# Patient Record
Sex: FEMALE | Race: WHITE | Employment: PART TIME | URBAN - METROPOLITAN AREA
[De-identification: names, ages, dates, MRNs, and addresses within clinical notes are randomized per-mention and may not be internally consistent; named-entity substitution may affect disease eponyms.]

---

## 2014-08-22 LAB — HEP C HIM: NORMAL

## 2017-03-03 ENCOUNTER — TRANSFERRED RECORDS (OUTPATIENT)
Dept: HEALTH INFORMATION MANAGEMENT | Facility: CLINIC | Age: 32
End: 2017-03-03

## 2017-03-10 ENCOUNTER — TRANSFERRED RECORDS (OUTPATIENT)
Dept: HEALTH INFORMATION MANAGEMENT | Facility: CLINIC | Age: 32
End: 2017-03-10

## 2017-03-10 LAB — TSH SERPL-ACNC: 2.12 UIU/ML (ref 0.45–4.5)

## 2017-03-13 ENCOUNTER — MEDICAL CORRESPONDENCE (OUTPATIENT)
Dept: HEALTH INFORMATION MANAGEMENT | Facility: CLINIC | Age: 32
End: 2017-03-13

## 2017-03-14 ENCOUNTER — OFFICE VISIT (OUTPATIENT)
Dept: SURGERY | Facility: CLINIC | Age: 32
End: 2017-03-14
Payer: COMMERCIAL

## 2017-03-14 VITALS
HEIGHT: 67 IN | SYSTOLIC BLOOD PRESSURE: 138 MMHG | BODY MASS INDEX: 33.74 KG/M2 | HEART RATE: 77 BPM | WEIGHT: 215 LBS | DIASTOLIC BLOOD PRESSURE: 88 MMHG

## 2017-03-14 DIAGNOSIS — N83.201 RIGHT OVARIAN CYST: Primary | ICD-10-CM

## 2017-03-14 PROCEDURE — 99204 OFFICE O/P NEW MOD 45 MIN: CPT | Performed by: SURGERY

## 2017-03-14 NOTE — MR AVS SNAPSHOT
"              After Visit Summary   3/14/2017    Leisa Leong    MRN: [id number]           Patient Information     Date Of Birth          1985        Visit Information        Provider Department      3/14/2017 1:45 PM Ricardo Acosta MD [facility]       Follow-ups after your visit        Who to contact     If you have questions or need follow up information about today's clinic visit or your schedule please contact [facility] directly at [phone].  Normal or non-critical lab and imaging results will be communicated to you by [employer], letter or phone within 4 business days after the clinic has received the results. If you do not hear from us within 7 days, please contact the clinic through [employer] or phone. If you have a critical or abnormal lab result, we will notify you by phone as soon as possible.  Submit refill requests through [employer] or call your pharmacy and they will forward the refill request to us. Please allow 3 business days for your refill to be completed.          Additional Information About Your Visit        MyChart Information     [employer] gives you secure access to your electronic health record. If you see a primary care provider, you can also send messages to your care team and make appointments. If you have questions, please call your primary care clinic.  If you do not have a primary care provider, please call [phone] and they will assist you.        Care EveryWhere ID     This is your Care EveryWhere ID. This could be used by other organizations to access your [place] medical records  [id number]        Your Vitals Were     Pulse Height BMI (Body Mass Index)             77 5' 7\" (1.702 m) 33.67 kg/m2          Blood Pressure from Last 3 Encounters:   03/14/17 138/88   09/30/16 120/72   09/23/16 119/67    Weight from Last 3 Encounters:   03/14/17 215 lb (97.5 kg)   09/30/16 220 lb (99.8 kg) "   09/23/16 219 lb 12.8 oz (99.7 kg)              Today, you had the following     No orders found for display       Primary Care Provider Office Phone # Fax #    Nubia Felicia Gregorio [phone]       [facility] [address]  [place] [address]        Thank you!     Thank you for choosing [facility]  for your care. Our goal is always to provide you with excellent care. Hearing back from our patients is one way we can continue to improve our services. Please take a few minutes to complete the written survey that you may receive in the mail after your visit with us. Thank you!             Your Updated Medication List - Protect others around you: Learn how to safely use, store and throw away your medicines at www.disposemymeds.org.          This list is accurate as of: 3/14/17  1:55 PM.  Always use your most recent med list.                   Brand Name Dispense Instructions for use    butalbital-acetaminophen-caffeine -40 MG per tablet    FIORICET/ESGIC     Take 1 tablet by mouth every 4 hours as needed for headaches or migraine Reported on 3/14/2017       fluticasone-salmeterol 115-21 MCG/ACT Inhaler    ADVAIR-HFA     Inhale 2 puffs into the lungs 2 times daily       ibuprofen 800 MG tablet    ADVIL/MOTRIN    40 tablet    Take 1 tablet (800 mg) by mouth every 6 hours as needed for other (cramping)       loratadine 10 MG tablet    CLARITIN     Take 10 mg by mouth daily Reported on 3/14/2017       oxyCODONE-acetaminophen 5-325 MG per tablet    PERCOCET    30 tablet    Take 1-2 tablets by mouth every 4 hours as needed for pain (moderate to severe)       PRENATAL 1 PO      Take 1 tablet by mouth daily Reported on 3/14/2017       PROAIR HFA IN      Inhale 2 puffs into the lungs daily.       TYLENOL PO      Take 1,000 mg by mouth every 6 hours as needed Reported on 3/14/2017

## 2017-03-14 NOTE — LETTER
Surgery Consultation, [facility] WILLIAM Acosta MD    2017     Leisa Leong MRN# [id number]   YOB: 1985 Age: 31 year old      PCP: Nubia Gregorio [phone]     Chief Complaint: Ovarian cyst, intra-abdominal adhesions     Pt was seen in consultation from Nubia Gregorio.     History of Present Illness: Leisa Leong is a 31 year old female who presented with Several months of abdominal pain. She is been noted to have an ovarian cyst and there is some concern about her ability to become pregnant. Patient has one daughter and is interested in additional pregnancy. She has had multiple abdominal surgeries, both open and laparoscopic. She has a history of multiple fibroids and has had surgery for endometriosis and intra-abdominal adhesions. We are asked to help manage intra-abdominal adhesions at the time of gynecologic exploration.     PMH: Leisa Leong  has a past medical history of Anxiety and depression; Asthma; Family history of diabetes mellitus; Family history of other cardiovascular diseases; Fibroids; Headaches; HPV in female; previous reproductive problem; Migraine; Motion sickness; Other acne; PONV (postoperative nausea and vomiting); and Seasonal allergies.  PSH: Leisa Leong  has a past surgical history that includes hysteroscopy,remove myoma (2008); LASIK (EMP) W OPT MYOPIA -3.12 (2009); pending (2011); CREATE EARDRUM OPENING,GEN ANESTH (infant); REMOVAL OF TONSILS,12+ Y/O (3/2008); Excise lesion perineal (2014); TOOTH EXTRACTION W/FORCEP (3/2007); Lasik bilateral (Bilateral, );  section (N/A, 3/4/2015); Remove cerclage (N/A, 3/4/2015); Laparoscopic lysis adhesions (N/A, 2016); HYSTEROSCOPY, SURGICAL; W/ REMOVAL LEIOMYOMATA (2007); Dilation and curettage suction (1/10/2013); Myomectomy uterus (2013); Cystectomy ovarian benign (2013); Dilation and curettage; Operative hysteroscopy with  "morcellator (6/12/2014); Laparoscopic tubal dye study (6/12/2014); Cerclage cervical (N/A, 12/10/2014); Cerclage cervical (N/A, 1/30/2015); Dilation and curettage, operative hysteroscopy with morcellator, combined (N/A, 8/9/2016); Laparoscopic tubal dye study (8/9/2016); GYN surgery; wisdom teeth extraction; and Operative hysteroscopy with morcellator (N/A, 9/23/2016).     Home medications and allergies reviewed.     Social History: Leisa Leong  reports that she quit smoking about 4 years ago. She has a 3.25 pack-year smoking history. She has never used smokeless tobacco. She reports that she drinks alcohol. She reports that she does not use illicit drugs.  Family History: Leisa Leong family history includes CANCER in her father; DIABETES in her maternal grandmother; Depression in her father, maternal grandfather, mother, and paternal grandmother; Hypertension in her father. There is no history of C.A.D., Breast Cancer, or Cancer - colorectal.     ROS: The 10 point Review of Systems is negative other than noted in the HPI.      Physical Exam:  Blood pressure 138/88, pulse 77, height 5' 7\" (1.702 m), weight 215 lb (97.5 kg), not currently breastfeeding.  215 lbs 0 oz  Overweight healthy appearing female in no distress.  Patient has a pleasant affect and communicates well.   Pupils equal round and reactive to light.   No cervical lymphadenopathy or thyromegaly.   Lung fields clear, breathing comfortably.   Heart normal sinus rhythm. No murmurs rubs or gallops.  Abdomen soft, nontender, nondistended. Low transverse incision, well-healed. Laparoscopy scars.  Skin warm, dry. No obvious rashes or lesions.     All new lab and imaging data was reviewed.       Assessment/plan: Pleasant 31-year-old female interested in getting pregnant. Has had a very complicated surgical history with multiple intra-abdominal adhesions noted. I think attempts could be made to perform a laparoscopic lysis of adhesions to identify and " address any adnexal issues in the pelvis. Patient understands that, if laparoscopic mobilization of the structures was not possible, open procedure may need to be considered. Patient also understands there is a risk of injury to the bowel or bladder. I will discuss the matter with her gynecologic surgeon and we will try to schedule a time.  Surgical co-morbities include Multiple previous surgeries, obesity.     Tomy Acosta M.D.  [facility]  [phone]

## 2017-03-17 NOTE — PROGRESS NOTES
Surgery Consultation, [facility], WILLIAM Acosta MD    Leisa Leong MRN# [id number]   YOB: 1985 Age: 31 year old     PCP:  Nubia Gregorio [phone]    Chief Complaint:  Ovarian cyst, intra-abdominal adhesions    Pt was seen in consultation from Nubia Gregorio.    History of Present Illness:  Leisa Leong is a 31 year old female who presented with Several months of abdominal pain. She is been noted to have an ovarian cyst and there is some concern about her ability to become pregnant. Patient has one daughter and is interested in additional pregnancy. She has had multiple abdominal surgeries, both open and laparoscopic. She has a history of multiple fibroids and has had surgery for endometriosis and intra-abdominal adhesions. We are asked to help manage intra-abdominal adhesions at the time of gynecologic exploration.    PMH:  Leisa Leong  has a past medical history of Anxiety and depression; Asthma; Family history of diabetes mellitus; Family history of other cardiovascular diseases; Fibroids; Headaches; HPV in female; previous reproductive problem; Migraine; Motion sickness; Other acne; PONV (postoperative nausea and vomiting); and Seasonal allergies.  PSH:  Leisa Leong  has a past surgical history that includes hysteroscopy,remove myoma (2008); LASIK (EMP) W OPT MYOPIA -3.12 (2009); pending (2011); CREATE EARDRUM OPENING,GEN ANESTH (infant); REMOVAL OF TONSILS,12+ Y/O (3/2008); Excise lesion perineal (2014); TOOTH EXTRACTION W/FORCEP (3/2007); Lasik bilateral (Bilateral, );  section (N/A, 3/4/2015); Remove cerclage (N/A, 3/4/2015); Laparoscopic lysis adhesions (N/A, 2016); HYSTEROSCOPY, SURGICAL; W/ REMOVAL LEIOMYOMATA (2007); Dilation and curettage suction (1/10/2013); Myomectomy uterus (2013); Cystectomy ovarian benign (2013); Dilation and curettage; Operative hysteroscopy with morcellator  "(6/12/2014); Laparoscopic tubal dye study (6/12/2014); Cerclage cervical (N/A, 12/10/2014); Cerclage cervical (N/A, 1/30/2015); Dilation and curettage, operative hysteroscopy with morcellator, combined (N/A, 8/9/2016); Laparoscopic tubal dye study (8/9/2016); GYN surgery; wisdom teeth extraction; and Operative hysteroscopy with morcellator (N/A, 9/23/2016).    Home medications and allergies reviewed.    Social History:  Leisa Leong  reports that she quit smoking about 4 years ago. She has a 3.25 pack-year smoking history. She has never used smokeless tobacco. She reports that she drinks alcohol. She reports that she does not use illicit drugs.  Family History:  Leisa Leong family history includes CANCER in her father; DIABETES in her maternal grandmother; Depression in her father, maternal grandfather, mother, and paternal grandmother; Hypertension in her father. There is no history of C.A.D., Breast Cancer, or Cancer - colorectal.    ROS:  The 10 point Review of Systems is negative other than noted in the HPI.      Physical Exam:  Blood pressure 138/88, pulse 77, height 5' 7\" (1.702 m), weight 215 lb (97.5 kg), not currently breastfeeding.  215 lbs 0 oz  Overweight healthy appearing female in no distress.  Patient has a pleasant affect and communicates well.   Pupils equal round and reactive to light.   No cervical lymphadenopathy or thyromegaly.   Lung fields clear, breathing comfortably.   Heart normal sinus rhythm.  No murmurs rubs or gallops.  Abdomen soft, nontender, nondistended.  Low transverse incision, well-healed. Laparoscopy scars.  Skin warm, dry.  No obvious rashes or lesions.    All new lab and imaging data was reviewed.      Assessment/plan:  Pleasant 31-year-old female interested in getting pregnant. Has had a very complicated surgical history with multiple intra-abdominal adhesions noted. I think attempts could be made to perform a laparoscopic lysis of adhesions to identify and address " any adnexal issues in the pelvis. Patient understands that, if laparoscopic mobilization of the structures was not possible, open procedure may need to be considered. Patient also understands there is a risk of injury to the bowel or bladder. I will discuss the matter with her gynecologic surgeon and we will try to schedule a time.  Surgical co-morbities include Multiple previous surgeries, obesity.    Tomy Acosta M.D.  [facility]  [phone]    Please route or send letter to:  Primary Care Provider (PCP) and Referring Provider

## 2017-04-25 ENCOUNTER — HOSPITAL ENCOUNTER (OUTPATIENT)
Facility: CLINIC | Age: 32
Discharge: HOME | End: 2017-04-26
Attending: OBSTETRICS & GYNECOLOGY | Admitting: OBSTETRICS & GYNECOLOGY
Payer: COMMERCIAL

## 2017-04-25 ENCOUNTER — ANESTHESIA EVENT (OUTPATIENT)
Dept: SURGERY | Facility: CLINIC | Age: 32
End: 2017-04-25
Payer: COMMERCIAL

## 2017-04-25 ENCOUNTER — ANESTHESIA (OUTPATIENT)
Dept: SURGERY | Facility: CLINIC | Age: 32
End: 2017-04-25
Payer: COMMERCIAL

## 2017-04-25 ENCOUNTER — HOSPITAL ENCOUNTER (OUTPATIENT)
Dept: ULTRASOUND IMAGING | Facility: CLINIC | Age: 32
End: 2017-04-25
Attending: OBSTETRICS & GYNECOLOGY | Admitting: OBSTETRICS & GYNECOLOGY
Payer: COMMERCIAL

## 2017-04-25 ENCOUNTER — APPOINTMENT (OUTPATIENT)
Dept: SURGERY | Facility: PHYSICIAN GROUP | Age: 32
End: 2017-04-25
Payer: COMMERCIAL

## 2017-04-25 DIAGNOSIS — G89.18 POST-OP PAIN: ICD-10-CM

## 2017-04-25 DIAGNOSIS — N83.201 RIGHT OVARIAN CYST: Primary | ICD-10-CM

## 2017-04-25 DIAGNOSIS — N83.209 CYST OF OVARY, UNSPECIFIED LATERALITY: ICD-10-CM

## 2017-04-25 DIAGNOSIS — K59.03 DRUG-INDUCED CONSTIPATION: ICD-10-CM

## 2017-04-25 PROBLEM — Z98.890 S/P LAPAROSCOPY: Status: ACTIVE | Noted: 2017-04-25

## 2017-04-25 LAB — HCG SERPL QL: NEGATIVE

## 2017-04-25 PROCEDURE — 25000125 ZZHC RX 250

## 2017-04-25 PROCEDURE — 71000013 ZZH RECOVERY PHASE 1 LEVEL 1 EA ADDTL HR: Performed by: OBSTETRICS & GYNECOLOGY

## 2017-04-25 PROCEDURE — 25800025 ZZH RX 258

## 2017-04-25 PROCEDURE — 84703 CHORIONIC GONADOTROPIN ASSAY: CPT | Performed by: OBSTETRICS & GYNECOLOGY

## 2017-04-25 PROCEDURE — 25800025 ZZH RX 258: Performed by: ANESTHESIOLOGY

## 2017-04-25 PROCEDURE — 88305 TISSUE EXAM BY PATHOLOGIST: CPT | Performed by: OBSTETRICS & GYNECOLOGY

## 2017-04-25 PROCEDURE — 25000125 ZZHC RX 250: Performed by: OBSTETRICS & GYNECOLOGY

## 2017-04-25 PROCEDURE — 25000128 H RX IP 250 OP 636: Performed by: OBSTETRICS & GYNECOLOGY

## 2017-04-25 PROCEDURE — 71000012 ZZH RECOVERY PHASE 1 LEVEL 1 FIRST HR: Performed by: OBSTETRICS & GYNECOLOGY

## 2017-04-25 PROCEDURE — 40000170 ZZH STATISTIC PRE-PROCEDURE ASSESSMENT II: Performed by: OBSTETRICS & GYNECOLOGY

## 2017-04-25 PROCEDURE — 36415 COLL VENOUS BLD VENIPUNCTURE: CPT | Performed by: OBSTETRICS & GYNECOLOGY

## 2017-04-25 PROCEDURE — 25000128 H RX IP 250 OP 636: Performed by: ANESTHESIOLOGY

## 2017-04-25 PROCEDURE — 40000936 ZZH STATISTIC OUTPATIENT (NON-OBS) NIGHT

## 2017-04-25 PROCEDURE — 40000864 US INTRAOPERATIVE

## 2017-04-25 PROCEDURE — S0020 INJECTION, BUPIVICAINE HYDRO: HCPCS | Performed by: OBSTETRICS & GYNECOLOGY

## 2017-04-25 PROCEDURE — 88305 TISSUE EXAM BY PATHOLOGIST: CPT | Mod: 26 | Performed by: OBSTETRICS & GYNECOLOGY

## 2017-04-25 PROCEDURE — 40000935 ZZH STATISTIC OUTPATIENT (NON-OBS) EVE

## 2017-04-25 PROCEDURE — 36000056 ZZH SURGERY LEVEL 3 1ST 30 MIN: Performed by: OBSTETRICS & GYNECOLOGY

## 2017-04-25 PROCEDURE — 25800025 ZZH RX 258: Performed by: OBSTETRICS & GYNECOLOGY

## 2017-04-25 PROCEDURE — 37000008 ZZH ANESTHESIA TECHNICAL FEE, 1ST 30 MIN: Performed by: OBSTETRICS & GYNECOLOGY

## 2017-04-25 PROCEDURE — 27210995 ZZH RX 272: Performed by: OBSTETRICS & GYNECOLOGY

## 2017-04-25 PROCEDURE — 40000934 ZZH STATISTIC OUTPATIENT (NON-OBS) DAY

## 2017-04-25 PROCEDURE — 44180 LAP ENTEROLYSIS: CPT | Performed by: SURGERY

## 2017-04-25 PROCEDURE — 25000128 H RX IP 250 OP 636: Performed by: SURGERY

## 2017-04-25 PROCEDURE — 36000058 ZZH SURGERY LEVEL 3 EA 15 ADDTL MIN: Performed by: OBSTETRICS & GYNECOLOGY

## 2017-04-25 PROCEDURE — 37000009 ZZH ANESTHESIA TECHNICAL FEE, EACH ADDTL 15 MIN: Performed by: OBSTETRICS & GYNECOLOGY

## 2017-04-25 PROCEDURE — 25000128 H RX IP 250 OP 636

## 2017-04-25 PROCEDURE — 25000566 ZZH SEVOFLURANE, EA 15 MIN: Performed by: OBSTETRICS & GYNECOLOGY

## 2017-04-25 PROCEDURE — 25000132 ZZH RX MED GY IP 250 OP 250 PS 637: Performed by: OBSTETRICS & GYNECOLOGY

## 2017-04-25 PROCEDURE — 27210794 ZZH OR GENERAL SUPPLY STERILE: Performed by: OBSTETRICS & GYNECOLOGY

## 2017-04-25 RX ORDER — ONDANSETRON 2 MG/ML
4 INJECTION INTRAMUSCULAR; INTRAVENOUS EVERY 6 HOURS PRN
Status: DISCONTINUED | OUTPATIENT
Start: 2017-04-25 | End: 2017-04-26 | Stop reason: HOSPADM

## 2017-04-25 RX ORDER — CEFAZOLIN SODIUM 2 G/100ML
2 INJECTION, SOLUTION INTRAVENOUS
Status: DISCONTINUED | OUTPATIENT
Start: 2017-04-25 | End: 2017-04-25 | Stop reason: HOSPADM

## 2017-04-25 RX ORDER — LIDOCAINE HYDROCHLORIDE 20 MG/ML
INJECTION, SOLUTION INFILTRATION; PERINEURAL PRN
Status: DISCONTINUED | OUTPATIENT
Start: 2017-04-25 | End: 2017-04-25

## 2017-04-25 RX ORDER — ALBUTEROL SULFATE 90 UG/1
2 AEROSOL, METERED RESPIRATORY (INHALATION) DAILY PRN
Status: DISCONTINUED | OUTPATIENT
Start: 2017-04-25 | End: 2017-04-26 | Stop reason: HOSPADM

## 2017-04-25 RX ORDER — ONDANSETRON 4 MG/1
4 TABLET, ORALLY DISINTEGRATING ORAL EVERY 6 HOURS PRN
Status: DISCONTINUED | OUTPATIENT
Start: 2017-04-25 | End: 2017-04-26 | Stop reason: HOSPADM

## 2017-04-25 RX ORDER — GLYCOPYRROLATE 0.2 MG/ML
INJECTION, SOLUTION INTRAMUSCULAR; INTRAVENOUS PRN
Status: DISCONTINUED | OUTPATIENT
Start: 2017-04-25 | End: 2017-04-25

## 2017-04-25 RX ORDER — OXYCODONE AND ACETAMINOPHEN 5; 325 MG/1; MG/1
1-2 TABLET ORAL EVERY 4 HOURS PRN
Status: DISCONTINUED | OUTPATIENT
Start: 2017-04-25 | End: 2017-04-26 | Stop reason: HOSPADM

## 2017-04-25 RX ORDER — SODIUM CHLORIDE, SODIUM LACTATE, POTASSIUM CHLORIDE, CALCIUM CHLORIDE 600; 310; 30; 20 MG/100ML; MG/100ML; MG/100ML; MG/100ML
INJECTION, SOLUTION INTRAVENOUS CONTINUOUS
Status: DISCONTINUED | OUTPATIENT
Start: 2017-04-25 | End: 2017-04-25 | Stop reason: HOSPADM

## 2017-04-25 RX ORDER — EPHEDRINE SULFATE 50 MG/ML
INJECTION, SOLUTION INTRAMUSCULAR; INTRAVENOUS; SUBCUTANEOUS PRN
Status: DISCONTINUED | OUTPATIENT
Start: 2017-04-25 | End: 2017-04-25

## 2017-04-25 RX ORDER — MISOPROSTOL 200 UG/1
200 TABLET ORAL ONCE
Status: ON HOLD | COMMUNITY
End: 2017-04-25

## 2017-04-25 RX ORDER — KETOROLAC TROMETHAMINE 30 MG/ML
30 INJECTION, SOLUTION INTRAMUSCULAR; INTRAVENOUS ONCE
Status: COMPLETED | OUTPATIENT
Start: 2017-04-25 | End: 2017-04-25

## 2017-04-25 RX ORDER — CETIRIZINE HYDROCHLORIDE 10 MG/1
10 TABLET ORAL EVERY MORNING
COMMUNITY

## 2017-04-25 RX ORDER — FENTANYL CITRATE 0.05 MG/ML
25-50 INJECTION, SOLUTION INTRAMUSCULAR; INTRAVENOUS
Status: DISCONTINUED | OUTPATIENT
Start: 2017-04-25 | End: 2017-04-25 | Stop reason: HOSPADM

## 2017-04-25 RX ORDER — OXYCODONE AND ACETAMINOPHEN 5; 325 MG/1; MG/1
1-2 TABLET ORAL EVERY 4 HOURS PRN
Qty: 30 TABLET | Refills: 0 | Status: SHIPPED | OUTPATIENT
Start: 2017-04-25 | End: 2017-08-29

## 2017-04-25 RX ORDER — OMEGA-3 FATTY ACIDS/FISH OIL 300-1000MG
600-800 CAPSULE ORAL 3 TIMES DAILY PRN
COMMUNITY

## 2017-04-25 RX ORDER — METOCLOPRAMIDE 10 MG/1
10 TABLET ORAL EVERY 6 HOURS PRN
Status: DISCONTINUED | OUTPATIENT
Start: 2017-04-25 | End: 2017-04-26 | Stop reason: HOSPADM

## 2017-04-25 RX ORDER — IBUPROFEN 600 MG/1
600 TABLET, FILM COATED ORAL
Status: DISCONTINUED | OUTPATIENT
Start: 2017-04-25 | End: 2017-04-26

## 2017-04-25 RX ORDER — ALBUTEROL SULFATE 90 UG/1
2 AEROSOL, METERED RESPIRATORY (INHALATION) DAILY PRN
COMMUNITY

## 2017-04-25 RX ORDER — CEFAZOLIN SODIUM 1 G/3ML
1 INJECTION, POWDER, FOR SOLUTION INTRAMUSCULAR; INTRAVENOUS SEE ADMIN INSTRUCTIONS
Status: DISCONTINUED | OUTPATIENT
Start: 2017-04-25 | End: 2017-04-25 | Stop reason: HOSPADM

## 2017-04-25 RX ORDER — OXYCODONE AND ACETAMINOPHEN 5; 325 MG/1; MG/1
1-2 TABLET ORAL
Status: DISCONTINUED | OUTPATIENT
Start: 2017-04-25 | End: 2017-04-26 | Stop reason: HOSPADM

## 2017-04-25 RX ORDER — PROPOFOL 10 MG/ML
INJECTION, EMULSION INTRAVENOUS PRN
Status: DISCONTINUED | OUTPATIENT
Start: 2017-04-25 | End: 2017-04-25

## 2017-04-25 RX ORDER — IBUPROFEN 600 MG/1
600 TABLET, FILM COATED ORAL EVERY 6 HOURS PRN
Status: DISCONTINUED | OUTPATIENT
Start: 2017-04-25 | End: 2017-04-26 | Stop reason: HOSPADM

## 2017-04-25 RX ORDER — PROCHLORPERAZINE MALEATE 5 MG
5-10 TABLET ORAL EVERY 6 HOURS PRN
Status: DISCONTINUED | OUTPATIENT
Start: 2017-04-25 | End: 2017-04-26 | Stop reason: HOSPADM

## 2017-04-25 RX ORDER — ONDANSETRON 2 MG/ML
4 INJECTION INTRAMUSCULAR; INTRAVENOUS EVERY 30 MIN PRN
Status: DISCONTINUED | OUTPATIENT
Start: 2017-04-25 | End: 2017-04-25 | Stop reason: HOSPADM

## 2017-04-25 RX ORDER — MEPERIDINE HYDROCHLORIDE 25 MG/ML
12.5 INJECTION INTRAMUSCULAR; INTRAVENOUS; SUBCUTANEOUS
Status: DISCONTINUED | OUTPATIENT
Start: 2017-04-25 | End: 2017-04-25 | Stop reason: HOSPADM

## 2017-04-25 RX ORDER — ONDANSETRON 4 MG/1
4 TABLET, ORALLY DISINTEGRATING ORAL EVERY 30 MIN PRN
Status: DISCONTINUED | OUTPATIENT
Start: 2017-04-25 | End: 2017-04-25 | Stop reason: HOSPADM

## 2017-04-25 RX ORDER — METOCLOPRAMIDE HYDROCHLORIDE 5 MG/ML
10 INJECTION INTRAMUSCULAR; INTRAVENOUS EVERY 6 HOURS PRN
Status: DISCONTINUED | OUTPATIENT
Start: 2017-04-25 | End: 2017-04-26 | Stop reason: HOSPADM

## 2017-04-25 RX ORDER — SODIUM CHLORIDE, SODIUM LACTATE, POTASSIUM CHLORIDE, CALCIUM CHLORIDE 600; 310; 30; 20 MG/100ML; MG/100ML; MG/100ML; MG/100ML
INJECTION, SOLUTION INTRAVENOUS CONTINUOUS PRN
Status: DISCONTINUED | OUTPATIENT
Start: 2017-04-25 | End: 2017-04-25

## 2017-04-25 RX ORDER — MAGNESIUM HYDROXIDE 1200 MG/15ML
LIQUID ORAL PRN
Status: DISCONTINUED | OUTPATIENT
Start: 2017-04-25 | End: 2017-04-25 | Stop reason: HOSPADM

## 2017-04-25 RX ORDER — BUPIVACAINE HYDROCHLORIDE 2.5 MG/ML
INJECTION, SOLUTION INFILTRATION; PERINEURAL PRN
Status: DISCONTINUED | OUTPATIENT
Start: 2017-04-25 | End: 2017-04-25 | Stop reason: HOSPADM

## 2017-04-25 RX ORDER — NALOXONE HYDROCHLORIDE 0.4 MG/ML
.1-.4 INJECTION, SOLUTION INTRAMUSCULAR; INTRAVENOUS; SUBCUTANEOUS
Status: DISCONTINUED | OUTPATIENT
Start: 2017-04-25 | End: 2017-04-26 | Stop reason: HOSPADM

## 2017-04-25 RX ORDER — CARBOXYMETHYLCELLULOSE SODIUM 5 MG/ML
1 SOLUTION/ DROPS OPHTHALMIC 2 TIMES DAILY PRN
COMMUNITY
End: 2017-08-29

## 2017-04-25 RX ORDER — DEXAMETHASONE SODIUM PHOSPHATE 4 MG/ML
INJECTION, SOLUTION INTRA-ARTICULAR; INTRALESIONAL; INTRAMUSCULAR; INTRAVENOUS; SOFT TISSUE PRN
Status: DISCONTINUED | OUTPATIENT
Start: 2017-04-25 | End: 2017-04-25

## 2017-04-25 RX ORDER — ONDANSETRON 2 MG/ML
INJECTION INTRAMUSCULAR; INTRAVENOUS PRN
Status: DISCONTINUED | OUTPATIENT
Start: 2017-04-25 | End: 2017-04-25

## 2017-04-25 RX ORDER — FENTANYL CITRATE 50 UG/ML
INJECTION, SOLUTION INTRAMUSCULAR; INTRAVENOUS PRN
Status: DISCONTINUED | OUTPATIENT
Start: 2017-04-25 | End: 2017-04-25

## 2017-04-25 RX ORDER — PHENAZOPYRIDINE HYDROCHLORIDE 200 MG/1
200 TABLET, FILM COATED ORAL ONCE
Status: COMPLETED | OUTPATIENT
Start: 2017-04-25 | End: 2017-04-25

## 2017-04-25 RX ORDER — MULTIPLE VITAMINS W/ MINERALS TAB 9MG-400MCG
1 TAB ORAL DAILY
COMMUNITY

## 2017-04-25 RX ORDER — ACETAMINOPHEN 325 MG/1
975 TABLET ORAL ONCE
Status: COMPLETED | OUTPATIENT
Start: 2017-04-25 | End: 2017-04-25

## 2017-04-25 RX ORDER — NEOSTIGMINE METHYLSULFATE 1 MG/ML
VIAL (ML) INJECTION PRN
Status: DISCONTINUED | OUTPATIENT
Start: 2017-04-25 | End: 2017-04-25

## 2017-04-25 RX ORDER — CETIRIZINE HYDROCHLORIDE 10 MG/1
10 TABLET ORAL AT BEDTIME
Status: DISCONTINUED | OUTPATIENT
Start: 2017-04-25 | End: 2017-04-26 | Stop reason: HOSPADM

## 2017-04-25 RX ORDER — VECURONIUM BROMIDE 1 MG/ML
INJECTION, POWDER, LYOPHILIZED, FOR SOLUTION INTRAVENOUS PRN
Status: DISCONTINUED | OUTPATIENT
Start: 2017-04-25 | End: 2017-04-25

## 2017-04-25 RX ORDER — NALOXONE HYDROCHLORIDE 0.4 MG/ML
.1-.4 INJECTION, SOLUTION INTRAMUSCULAR; INTRAVENOUS; SUBCUTANEOUS
Status: DISCONTINUED | OUTPATIENT
Start: 2017-04-25 | End: 2017-04-25 | Stop reason: HOSPADM

## 2017-04-25 RX ORDER — HYDROMORPHONE HYDROCHLORIDE 1 MG/ML
0.2 INJECTION, SOLUTION INTRAMUSCULAR; INTRAVENOUS; SUBCUTANEOUS
Status: DISCONTINUED | OUTPATIENT
Start: 2017-04-25 | End: 2017-04-26 | Stop reason: HOSPADM

## 2017-04-25 RX ORDER — CEFAZOLIN SODIUM 2 G/100ML
2 INJECTION, SOLUTION INTRAVENOUS
Status: COMPLETED | OUTPATIENT
Start: 2017-04-25 | End: 2017-04-25

## 2017-04-25 RX ORDER — PROPOFOL 10 MG/ML
INJECTION, EMULSION INTRAVENOUS CONTINUOUS PRN
Status: DISCONTINUED | OUTPATIENT
Start: 2017-04-25 | End: 2017-04-25

## 2017-04-25 RX ORDER — HYDROXYZINE HYDROCHLORIDE 25 MG/1
25 TABLET, FILM COATED ORAL EVERY 6 HOURS PRN
Status: DISCONTINUED | OUTPATIENT
Start: 2017-04-25 | End: 2017-04-26 | Stop reason: HOSPADM

## 2017-04-25 RX ADMIN — NEOSTIGMINE METHYLSULFATE 5 MG: 1 INJECTION INTRAMUSCULAR; INTRAVENOUS; SUBCUTANEOUS at 10:11

## 2017-04-25 RX ADMIN — PHENAZOPYRIDINE HYDROCHLORIDE 200 MG: 200 TABLET ORAL at 06:11

## 2017-04-25 RX ADMIN — VECURONIUM BROMIDE 2 MG: 1 INJECTION, POWDER, LYOPHILIZED, FOR SOLUTION INTRAVENOUS at 09:06

## 2017-04-25 RX ADMIN — VECURONIUM BROMIDE 1 MG: 1 INJECTION, POWDER, LYOPHILIZED, FOR SOLUTION INTRAVENOUS at 09:44

## 2017-04-25 RX ADMIN — MIDAZOLAM HYDROCHLORIDE 2 MG: 1 INJECTION, SOLUTION INTRAMUSCULAR; INTRAVENOUS at 07:37

## 2017-04-25 RX ADMIN — FENTANYL CITRATE 50 MCG: 50 INJECTION, SOLUTION INTRAMUSCULAR; INTRAVENOUS at 07:44

## 2017-04-25 RX ADMIN — VECURONIUM BROMIDE 2 MG: 1 INJECTION, POWDER, LYOPHILIZED, FOR SOLUTION INTRAVENOUS at 08:15

## 2017-04-25 RX ADMIN — ONDANSETRON 4 MG: 2 INJECTION INTRAMUSCULAR; INTRAVENOUS at 10:12

## 2017-04-25 RX ADMIN — SODIUM CHLORIDE, POTASSIUM CHLORIDE, SODIUM LACTATE AND CALCIUM CHLORIDE: 600; 310; 30; 20 INJECTION, SOLUTION INTRAVENOUS at 07:35

## 2017-04-25 RX ADMIN — FENTANYL CITRATE 50 MCG: 50 INJECTION, SOLUTION INTRAMUSCULAR; INTRAVENOUS at 07:42

## 2017-04-25 RX ADMIN — OXYCODONE HYDROCHLORIDE AND ACETAMINOPHEN 1 TABLET: 5; 325 TABLET ORAL at 20:11

## 2017-04-25 RX ADMIN — KETOROLAC TROMETHAMINE 30 MG: 30 INJECTION, SOLUTION INTRAMUSCULAR at 12:27

## 2017-04-25 RX ADMIN — GLYCOPYRROLATE 0.8 MG: 0.2 INJECTION, SOLUTION INTRAMUSCULAR; INTRAVENOUS at 10:10

## 2017-04-25 RX ADMIN — SODIUM CHLORIDE, POTASSIUM CHLORIDE, SODIUM LACTATE AND CALCIUM CHLORIDE: 600; 310; 30; 20 INJECTION, SOLUTION INTRAVENOUS at 09:02

## 2017-04-25 RX ADMIN — Medication 5 MG: at 08:34

## 2017-04-25 RX ADMIN — DEXAMETHASONE SODIUM PHOSPHATE 4 MG: 4 INJECTION, SOLUTION INTRA-ARTICULAR; INTRALESIONAL; INTRAMUSCULAR; INTRAVENOUS; SOFT TISSUE at 07:52

## 2017-04-25 RX ADMIN — CEFAZOLIN SODIUM 2 G: 2 INJECTION, SOLUTION INTRAVENOUS at 08:02

## 2017-04-25 RX ADMIN — CETIRIZINE HYDROCHLORIDE 10 MG: 10 TABLET, FILM COATED ORAL at 21:57

## 2017-04-25 RX ADMIN — ROCURONIUM BROMIDE 50 MG: 10 INJECTION INTRAVENOUS at 07:44

## 2017-04-25 RX ADMIN — ACETAMINOPHEN 975 MG: 325 TABLET, FILM COATED ORAL at 06:10

## 2017-04-25 RX ADMIN — PROPOFOL 200 MG: 10 INJECTION, EMULSION INTRAVENOUS at 07:44

## 2017-04-25 RX ADMIN — DEXMEDETOMIDINE 20 MCG: 100 INJECTION, SOLUTION, CONCENTRATE INTRAVENOUS at 10:12

## 2017-04-25 RX ADMIN — DEXMEDETOMIDINE 20 MCG: 100 INJECTION, SOLUTION, CONCENTRATE INTRAVENOUS at 09:59

## 2017-04-25 RX ADMIN — OXYCODONE HYDROCHLORIDE AND ACETAMINOPHEN 1 TABLET: 5; 325 TABLET ORAL at 19:14

## 2017-04-25 RX ADMIN — FENTANYL CITRATE 50 MCG: 50 INJECTION, SOLUTION INTRAMUSCULAR; INTRAVENOUS at 08:51

## 2017-04-25 RX ADMIN — LIDOCAINE HYDROCHLORIDE 60 MG: 20 INJECTION, SOLUTION INFILTRATION; PERINEURAL at 07:44

## 2017-04-25 RX ADMIN — HYDROMORPHONE HYDROCHLORIDE 0.2 MG: 1 INJECTION, SOLUTION INTRAMUSCULAR; INTRAVENOUS; SUBCUTANEOUS at 15:34

## 2017-04-25 RX ADMIN — VECURONIUM BROMIDE 1 MG: 1 INJECTION, POWDER, LYOPHILIZED, FOR SOLUTION INTRAVENOUS at 09:18

## 2017-04-25 RX ADMIN — PHENYLEPHRINE HYDROCHLORIDE 50 MCG: 10 INJECTION, SOLUTION INTRAMUSCULAR; INTRAVENOUS; SUBCUTANEOUS at 08:29

## 2017-04-25 RX ADMIN — Medication 5 MG: at 08:39

## 2017-04-25 RX ADMIN — PROPOFOL 30 MCG/KG/MIN: 10 INJECTION, EMULSION INTRAVENOUS at 07:46

## 2017-04-25 RX ADMIN — SODIUM CHLORIDE, POTASSIUM CHLORIDE, SODIUM LACTATE AND CALCIUM CHLORIDE: 600; 310; 30; 20 INJECTION, SOLUTION INTRAVENOUS at 11:18

## 2017-04-25 RX ADMIN — SODIUM CHLORIDE, POTASSIUM CHLORIDE, SODIUM LACTATE AND CALCIUM CHLORIDE: 600; 310; 30; 20 INJECTION, SOLUTION INTRAVENOUS at 13:08

## 2017-04-25 RX ADMIN — CEFAZOLIN 1 G: 1 INJECTION, POWDER, FOR SOLUTION INTRAMUSCULAR; INTRAVENOUS at 10:02

## 2017-04-25 RX ADMIN — DEXMEDETOMIDINE 20 MCG: 100 INJECTION, SOLUTION, CONCENTRATE INTRAVENOUS at 10:17

## 2017-04-25 ASSESSMENT — COPD QUESTIONNAIRES: COPD: 0

## 2017-04-25 ASSESSMENT — LIFESTYLE VARIABLES: TOBACCO_USE: 0

## 2017-04-25 NOTE — H&P
H&P Update    H&P reviewed, no changes.    33yo  with hx of multiple abd surgeries including abd myomectomy and classical C/S. Has cramping and bloating and was found to have 4cm complex left ovarian cyst. SIS also suggestive of possible uterine polyp vs. Submucosal myoma. Last laparoscopy was notable for multiple adhesions and inability to visualize left adnexa. R/B discussed including risk of bleeding, infection, damage to surrounding structures (bowel, bladder, blood vessels, ureters). Will proceed with hysteroscopy under US guidance, laparoscopy with TITUS with Dr. Acosta, possible laparotomy and left ovarian cystectomy.     Nubia Gregorio

## 2017-04-25 NOTE — ANESTHESIA PREPROCEDURE EVALUATION
Anesthesia Evaluation     . Pt has had prior anesthetic. Type: General    History of anesthetic complications   - difficult intubation and PONV  1 time told difficult intubation      ROS/MED HX    ENT/Pulmonary:     (+)asthma , . .   (-) tobacco use, COPD and sleep apnea   Neurologic:  - neg neurologic ROS     Cardiovascular:  - neg cardiovascular ROS       METS/Exercise Tolerance:  >4 METS   Hematologic:         Musculoskeletal:         GI/Hepatic:  - neg GI/hepatic ROS       Renal/Genitourinary: Comment: fibroids     (-) renal disease   Endo:      (-) Type II DM   Psychiatric:     (+) psychiatric history anxiety and depression      Infectious Disease:         Malignancy:         Other:                     Physical Exam  Normal systems: dental    Airway   Mallampati: II  TM distance: >3 FB  Neck ROM: full    Dental     Cardiovascular   Rhythm and rate: regular and normal      Pulmonary    breath sounds clear to auscultation                    Anesthesia Plan      History & Physical Review  History and physical reviewed and following examination; no interval change.    ASA Status:  2 .    NPO Status:  > 8 hours    Plan for General and ETT with Intravenous and Propofol induction. Maintenance will be Balanced.    PONV prophylaxis:  Ondansetron (or other 5HT-3), Dexamethasone or Solumedrol and Other (See comment) (propofol infusion)  Additional equipment: Videolaryngoscope      Postoperative Care  Postoperative pain management:  IV analgesics and Oral pain medications.      Consents  Anesthetic plan, risks, benefits and alternatives discussed with:  Patient.  Use of blood products discussed: Yes.   Use of blood products discussed with Patient.  Consented to blood products.  .                          .

## 2017-04-25 NOTE — IP AVS SNAPSHOT
[facility]    [address] [place] [id number]    Phone:  [phone]                                       After Visit Summary   4/25/2017    Leisa Leong    MRN: [id number]           After Visit Summary Signature Page     I have received my discharge instructions, and my questions have been answered. I have discussed any challenges I see with this plan with the nurse or doctor.    ..........................................................................................................................................  Patient/Patient Representative Signature      ..........................................................................................................................................  Patient Representative Print Name and Relationship to Patient    ..................................................               ................................................  Date                                            Time    ..........................................................................................................................................  Reviewed by Signature/Title    ...................................................              ..............................................  Date                                                            Time

## 2017-04-25 NOTE — ANESTHESIA POSTPROCEDURE EVALUATION
Patient: Leisa Leong    Procedure(s):  HYSTEROSCOPY DILATION AND CURATTAGE WITH ULTRASOUND GUIDANCE,  LAPAROSCOPIC LYSIS OF ADHESIONS, LAPAROSCOPIC BILATERAL OVARIAN CYSTECTOMY - Wound Class: II-Clean Contaminated   - Wound Class: II-Clean Contaminated   - Wound Class: II-Clean Contaminated    Diagnosis:OVARIAN CYST AND UTERINE FIBROID   Diagnosis Additional Information: No value filed.    Anesthesia Type:  General, ETT    Note:  Anesthesia Post Evaluation    Patient location during evaluation: PACU  Patient participation: Able to fully participate in evaluation  Level of consciousness: awake  Pain management: adequate  Airway patency: patent  Cardiovascular status: acceptable  Respiratory status: acceptable  Hydration status: acceptable  PONV: none     Anesthetic complications: None          Last vitals:  Vitals:    04/25/17 1400 04/25/17 1423 04/25/17 1445   BP: 95/60 94/54 117/58   Resp: 21 15 16   Temp:  36.7  C (98.1  F)    SpO2: 97% 98% 96%         Electronically Signed By: Issac Sotomayor MD  April 25, 2017  3:24 PM

## 2017-04-25 NOTE — IP AVS SNAPSHOT
MRN:[id number]                      After Visit Summary   4/25/2017    Leisa Leong    MRN: [id number]           Thank you!     Thank you for choosing [facility] for your care. Our goal is always to provide you with excellent care. Hearing back from our patients is one way we can continue to improve our services. Please take a few minutes to complete the written survey that you may receive in the mail after you visit with us. Thank you!        Patient Information     Date Of Birth          1985        About your hospital stay     You were admitted on:  April 25, 2017 [facility]    You were discharged on:  April 26, 2017       Who to Call     For medical emergencies, please call 911.  For non-urgent questions about your medical care, please call your primary care provider or clinic, [phone]  For questions related to your surgery, please call your surgery clinic        Attending Provider     Provider Nubia Eller MD OB/Gyn       Primary Care Provider Office Phone # Fax #    Nubia Gregorio [phone]       [facility] [address]  [place] [address]        After Care Instructions     Discharge Instructions       Pelvic Rest. No tampons, douching or intercourse for  3  weeks.            Discharge Instructions       Patient to arrange follow up appointment in 2  weeks            Dressing       Keep dressing clean and dry, change as instructed by Provider or RN            No lifting       No lifting over 15 pounds and no strenuous physical activity.  For 3 weeks            Shower        Shower on Post-op day  1.   DO NOT take a bath                  Further instructions from your care team       Same Day Surgery Discharge Instructions for  Sedation and General Anesthesia       It's not unusual to feel dizzy, light-headed or faint for up to 24 hours after surgery or while taking pain  medication.  If you have these symptoms: sit for a few minutes before standing and have someone assist you when you get up to walk or use the bathroom.      You should rest and relax for the next 24 hours. We recommend you make arrangements to have an adult stay with you for at least 24 hours after your discharge.  Avoid hazardous and strenuous activity.      DO NOT DRIVE any vehicle or operate mechanical equipment for 24 hours following the end of your surgery.  Even though you may feel normal, your reactions may be affected by the medication you have received.      Do not drink alcoholic beverages for 24 hours following surgery.       Slowly progress to your regular diet as you feel able. It's not unusual to feel nauseated and/or vomit after receiving anesthesia.  If you develop these symptoms, drink clear liquids (apple juice, ginger ale, broth, 7-up, etc. ) until you feel better.  If your nausea and vomiting persists for 24 hours, please notify your surgeon.        All narcotic pain medications, along with inactivity and anesthesia, can cause constipation. Drinking plenty of liquids and increasing fiber intake will help.      For any questions of a medical nature, call your surgeon.      Do not make important decisions for 24 hours.      If you had general anesthesia, you may have a sore throat for a couple of days related to the breathing tube used during surgery.  You may use Cepacol lozenges to help with this discomfort.  If it worsens or if you develop a fever, contact your surgeon.       If you feel your pain is not well managed with the pain medications prescribed by your surgeon, please contact your surgeon's office to let them know so they can address your concerns.       [facility]  D&C OR Laparoscopy  Discharge Instructions    ACTIVITY:  You may restart normal activities as your abdominal discomfort disappears.  You may expect some discomfort under the ribs and shoulder area for the  first 24 hours.  In nearly all cases, this will disappear shortly after the first day.  It is certainly permissible to climb stairs, shower, and do ordinary, quiet activities.  More vigorous activities such as sports, intercourse and work may be resumed in 48-72 hours as seems to befit your situation.    OFFICE VISIT:  Please call a day or two after your surgery to make an appointment in approximately 2 weeks to discuss the results of your surgery and have your check-up.    VAGINAL DISCHARGE:  You may have some bloody vaginal discharge for as long as one week.  Ordinary tampons may be used after 3-4 days.  Avoid super absorbent tampons.    TEMPERATURE:  If you develop a temperature elevation of 101 F or higher, please call our office immediately.    RESTRICTIONS:  Due to the effects of general anesthesia, please do not drive a car, drink alcoholic beverages, nor operate complex machinery in the first 24 hours following surgery.    PLEASE FEEL FREE TO CALL OUR OFFICE IF ANY QUESTIONS OR PROBLEMS ARISE.    [facility]    Mitchell Medellin M.D.  Marc Stapleton M.D.   Scar Benítez M.D.  EL Minaya M.D.   Angle Enciso M.D.  Nubia Gregorio M.D.  SHASHA Merritt M.D.   EL Goodson M.D.   _________________________________________________________________________________                   [facility]              [address] [facility] [address]  [address]            [facility] [address]  [place] [address]            [phone] (Telephone)                                               [phone] (Telephone)            [phone] (Fax)  [phone] (Fax)            [facility]        While you were at the hospital today you were given 1000 mg of Tylenol  "at 6:10am. The recommended daily maximum dose is 4000 mg.       While you were at the hospital today you received Toradol, an antiinflammatory medication similar to Ibuprofen.  You should not take other antiinflammatory medication, such as Ibuprofen, Motrin, Advil, Aleve, Naprosyn, etc, until 6:30.     Pending Results     Date and Time Order Name Status Description    4/26/2017 0859 US Upper Extremity Venous Duplex Right Preliminary             Statement of Approval     Ordered          04/26/17 7665  I have reviewed and agree with all the recommendations and orders detailed in this document.  EFFECTIVE NOW     Approved and electronically signed by:  Terri Chilel MD             Admission Information     Date & Time Provider Department Dept. Phone    4/25/2017 Nubia Gregorio MD [facility] [phone]      Your Vitals Were     Blood Pressure Temperature Respirations Height Weight Last Period    104/57 (BP Location: Right arm) 98.6  F (37  C) (Oral) 16 1.702 m (5' 7\") 101 kg (222 lb 9.6 oz) 04/03/2017 (Exact Date)    Pulse Oximetry BMI (Body Mass Index)                98% 34.86 kg/m2          [employer] Information     [employer] gives you secure access to your electronic health record. If you see a primary care provider, you can also send messages to your care team and make appointments. If you have questions, please call your primary care clinic.  If you do not have a primary care provider, please call [phone] and they will assist you.        Care EveryWhere ID     This is your Care EveryWhere ID. This could be used by other organizations to access your [facility] medical records  [id number]           Review of your medicines      START taking        Dose / Directions    oxyCODONE-acetaminophen 5-325 MG per tablet   Commonly known as:  PERCOCET   Used for:  Right ovarian cyst, Post-op pain        Dose:  1-2 tablet   Take 1-2 tablets by mouth every 4 hours as needed for pain (moderate " to severe)   Quantity:  30 tablet   Refills:  0       senna-docusate 8.6-50 MG per tablet   Commonly known as:  SENOKOT-S;PERICOLACE   Used for:  Drug-induced constipation        Dose:  1 tablet   Take 1 tablet by mouth 2 times daily   Quantity:  60 tablet   Refills:  1         CONTINUE these medicines which have NOT CHANGED        Dose / Directions    albuterol 108 (90 BASE) MCG/ACT Inhaler   Commonly known as:  PROAIR HFA/PROVENTIL HFA/VENTOLIN HFA        Dose:  2 puff   Inhale 2 puffs into the lungs daily as needed for shortness of breath / dyspnea or wheezing   Refills:  0       butalbital-acetaminophen-caffeine -40 MG per tablet   Commonly known as:  FIORICET/ESGIC   Indication:  Migraine Headache, Tension Headache        Dose:  1 tablet   Take 1 tablet by mouth every 4 hours as needed for headaches or migraine Reported on 3/14/2017   Refills:  0       carboxymethylcellulose 0.5 % Soln ophthalmic solution   Commonly known as:  REFRESH PLUS        Dose:  1 drop   Place 1 drop into both eyes 2 times daily as needed for dry eyes   Refills:  0       cetirizine 10 MG tablet   Commonly known as:  zyrTEC        Dose:  10 mg   Take 10 mg by mouth At Bedtime   Refills:  0       fluticasone-salmeterol 115-21 MCG/ACT Inhaler   Commonly known as:  ADVAIR-HFA        Dose:  2 puff   Inhale 2 puffs into the lungs 2 times daily as needed   Refills:  0       IBUPROFEN PO        Dose:  600-800 mg   Take 600-800 mg by mouth 3 times daily as needed for moderate pain   Refills:  0       Multi-vitamin Tabs tablet        Dose:  1 tablet   Take 1 tablet by mouth daily   Refills:  0       TYLENOL PO        Dose:  1000 mg   Take 1,000 mg by mouth every 6 hours as needed Reported on 3/14/2017   Refills:  0       VITAMIN D (CHOLECALCIFEROL) PO        Dose:  1 tablet   Take 1 tablet by mouth daily   Refills:  0         STOP taking     CYTOTEC 200 MCG tablet   Generic drug:  misoprostol           CYTOTEC PO           PROVERA PO                 Where to get your medicines      These medications were sent to [facility] [place], [place] - [address]  [address], [place] [address]     Phone:  [phone]     senna-docusate 8.6-50 MG per tablet         Some of these will need a paper prescription and others can be bought over the counter. Ask your nurse if you have questions.     Bring a paper prescription for each of these medications     oxyCODONE-acetaminophen 5-325 MG per tablet                Protect others around you: Learn how to safely use, store and throw away your medicines at www.disposemymeds.org.             Medication List: This is a list of all your medications and when to take them. Check marks below indicate your daily home schedule. Keep this list as a reference.      Medications           Morning Afternoon Evening Bedtime As Needed    albuterol 108 (90 BASE) MCG/ACT Inhaler   Commonly known as:  PROAIR HFA/PROVENTIL HFA/VENTOLIN HFA   Inhale 2 puffs into the lungs daily as needed for shortness of breath / dyspnea or wheezing   Next Dose Due:  Resume as at home                                butalbital-acetaminophen-caffeine -40 MG per tablet   Commonly known as:  FIORICET/ESGIC   Take 1 tablet by mouth every 4 hours as needed for headaches or migraine Reported on 3/14/2017   Next Dose Due:  Resume as at home                                carboxymethylcellulose 0.5 % Soln ophthalmic solution   Commonly known as:  REFRESH PLUS   Place 1 drop into both eyes 2 times daily as needed for dry eyes   Next Dose Due:  Resume as at home                                cetirizine 10 MG tablet   Commonly known as:  zyrTEC   Take 10 mg by mouth At Bedtime   Last time this was given:  10 mg on 4/25/2017  9:57 PM   Next Dose Due:  Resume as at home                                fluticasone-salmeterol 115-21 MCG/ACT Inhaler   Commonly known as:  ADVAIR-HFA   Inhale 2 puffs into the lungs 2 times daily as needed    Next Dose Due:  Resume as at home                                IBUPROFEN PO   Take 600-800 mg by mouth 3 times daily as needed for moderate pain   Next Dose Due:  Resume as at home                                   Multi-vitamin Tabs tablet   Take 1 tablet by mouth daily   Next Dose Due:  Resume as at home                                oxyCODONE-acetaminophen 5-325 MG per tablet   Commonly known as:  PERCOCET   Take 1-2 tablets by mouth every 4 hours as needed for pain (moderate to severe)   Last time this was given:  2 tablets on 4/26/2017  8:00 AM   Next Dose Due:  As needed every 4 hours                                   senna-docusate 8.6-50 MG per tablet   Commonly known as:  SENOKOT-S;PERICOLACE   Take 1 tablet by mouth 2 times daily   Last time this was given:  2 tablets on 4/26/2017  1:12 PM   Next Dose Due:  Tomorrow 4/27/17                                      TYLENOL PO   Take 1,000 mg by mouth every 6 hours as needed Reported on 3/14/2017   Last time this was given:  975 mg on 4/25/2017  6:10 AM   Next Dose Due:  As needed                                   VITAMIN D (CHOLECALCIFEROL) PO   Take 1 tablet by mouth daily   Next Dose Due:  Resume as at home

## 2017-04-25 NOTE — PROGRESS NOTES
Admission medication history interview status for the 4/25/2017  admission is complete. See EPIC admission navigator for prior to admission medications     Medication history source reliability:Good    Medication history interview source(s):Patient    Medication history resources (including written lists, pill bottles, clinic record):None    Primary pharmacy.[facility]    Additional medication history information not noted on PTA med list :None    Time spent in this activity: 45 minutes    Prior to Admission medications    Medication Sig Last Dose Taking? Auth Provider   albuterol (PROAIR HFA/PROVENTIL HFA/VENTOLIN HFA) 108 (90 BASE) MCG/ACT Inhaler Inhale 2 puffs into the lungs daily as needed for shortness of breath / dyspnea or wheezing 4/23/2017 at PRN Yes Reported, Patient   cetirizine (ZYRTEC) 10 MG tablet Take 10 mg by mouth At Bedtime 4/23/2017 at HS Yes Reported, Patient   IBUPROFEN PO Take 600-800 mg by mouth 3 times daily as needed for moderate pain 4/2/2017 at PRN Yes Reported, Patient   VITAMIN D, CHOLECALCIFEROL, PO Take 1 tablet by mouth daily 3/31/2017 at PM Yes Reported, Patient   multivitamin, therapeutic with minerals (MULTI-VITAMIN) TABS tablet Take 1 tablet by mouth daily 3/31/2017 at PM Yes Reported, Patient   carboxymethylcellulose (REFRESH PLUS) 0.5 % SOLN ophthalmic solution Place 1 drop into both eyes 2 times daily as needed for dry eyes 4/22/2017 at PRN Yes Reported, Patient   MedroxyPROGESTERone Acetate (PROVERA PO) Take 10 mg by mouth 3 times daily  4/24/2017 at PM Yes Reported, Patient   MiSOPROStol (CYTOTEC PO) Take 200 mcg by mouth once  4/25/2017 at 0540 Yes Reported, Patient   butalbital-acetaminophen-caffeine (FIORICET, ESGIC) -40 MG per tablet Take 1 tablet by mouth every 4 hours as needed for headaches or migraine Reported on 3/14/2017 More than a Month at PRN Yes Reported, Patient   fluticasone-salmeterol (ADVAIR-HFA) 115-21 MCG/ACT inhaler Inhale 2 puffs into the lungs 2  times daily as needed  4/17/2017 at PRN Yes Reported, Patient   Acetaminophen (TYLENOL PO) Take 1,000 mg by mouth every 6 hours as needed Reported on 3/14/2017 3/31/2017 at PRN Yes Reported, Patient   misoprostol (CYTOTEC) 200 MCG tablet Place 200 mcg vaginally once 4/24/2017 at PM  Reported, Patient

## 2017-04-25 NOTE — PLAN OF CARE
Problem: Goal Outcome Summary  Goal: Goal Outcome Summary  Outcome: Improving  A&O x4. VSS on O2 @ 1LPM. Up with SBA to ambulate to bathroom. Voiding adequately. Pt tolerated clear liquids, advanced to regular - pt to advance as tolerated. Lap sites x3, clean, dry, and intact. PRN IV dilaudid given for abdominal pain. Plan for discharge home tomorrow.

## 2017-04-25 NOTE — BRIEF OP NOTE
[facility] Brief Operative Note    Pre-operative diagnosis: OVARIAN CYST AND UTERINE FIBROID    Post-operative diagnosis Pelvic Adhesions, Bilateral Ovarian Cysts  Nl appearing uterine cavity   Procedure: Procedure(s):  OPERATIVE HYSTEROSCOPY(THIS IS FIRST), POSSIBLE POLYPECTOMY/MYOMECTOMY WITH ALVARADO AND NEPHEW MORCELLATION, MYOMECTOMY WITH ULTRASOUND GUIDANCE,  LAPAROSCOPIC LYSIS OF ADHESIONS, LEFT OVARIAN CYSTECTOMY AND POSSIBLE LAPAROTOMY(PORTABLE HD TOWER) - Wound Class: II-Clean Contaminated   - Wound Class: II-Clean Contaminated   - Wound Class: II-Clean Contaminated   - Wound Class: II-Clean Contaminated   Surgeon(s): Surgeon(s) and Role:  Panel 1:     * Nubia Gregorio MD - Primary     * Ricardo Acosta MD - Assisting    Panel 2:     * Nubia Gregorio MD - Primary     * Ricardo Acosta MD - Assisting    Panel 3:     * Nubia Gregorio MD - Primary     * Ricardo Acosta MD - Assisting     * Sue Montague PA-C - Assisting   Estimated blood loss: 15 mL    Specimens:   ID Type Source Tests Collected by Time Destination   A : RIGHT OVARIAN CYST Tissue Ovary, Right SURGICAL PATHOLOGY EXAM Nubia Gregorio MD 4/25/2017  9:46 AM    B : LEFT OVARIAN CYST Tissue Ovary, Left SURGICAL PATHOLOGY EXAM Nubia Gregorio MD 4/25/2017  9:49 AM    C : ENDOMETRIAL CURETTINGS Tissue Other SURGICAL PATHOLOGY EXAM Nuiba Gregorio MD 4/25/2017 10:12 AM       Findings: Anteverted uterus, nl appearing right tubal ostia. Left tubal ostia unable to be visualized. No obvious myoma in uterine cavity. Cavity shape nl in appearance. Dense adhesions of omentum to anterior abd wall and sigmoid and omentum to left adnexa and posterior uterine wall. Right ovary with large 3-4cm simple appearing cyst. Left ovary with 3cm endometrioma, left tube enlarged.    Nubia Gregorio     #[id number]

## 2017-04-25 NOTE — OP NOTE
General Surgery Operative Note    PREOPERATIVE DIAGNOSIS:  OVARIAN CYST AND UTERINE FIBROID     POSTOPERATIVE DIAGNOSIS:  Pelvic Adhesions, Bilateral Ovarian Cysts    PROCEDURE:  Exploratory laparoscopy, extensive lysis of adhesions    ANESTHESIA:  General.    PREOPERATIVE MEDICATIONS:  Ancef    SURGEON:  Ricardo Acosta MD    Cosurgeon:  Nubia Gregorio MD    INDICATIONS:  Fertility issues, extensive abdominal adhesions    PROCEDURE:  The patient was taken to the operating suite and uneventfully endotracheally intubated.  The abdomen was prepped and draped in a sterile fashion.  Surgeon initiated timeout was acknowledged.  Following the hysteroscopy, we proceeded with exploratory laparoscopy.  We entered the abdomen in the left upper quadrant using Visiport technique.  Two other 5 mm trochars were placed under laparoscopic visualization.  We spent some time taking down omental adhesions from the anterior abdominal wall.  This was done with combination of sharp and blunt dissection.  The right ovary was identified and found to be quite enlarged and cystic, consistent with intraoperative ultrasound.  This was fairly mobile.  We then proceeded with lysis of adhesions to mobilize the left ovary.  Left ovary was densely tethered to the sigmoid colon and we were able to get them  using sharp dissection.  We were eventually able to identify the fallopian tube and completely mobilized the ovary.  Dr. Gregorio then proceeded with her portion of the case.  At the conclusion of my portion of the case, all lap and needle counts were correct.  Estimated blood loss was 15 cc.        ESTIMATED BLOOD LOSS:  15 mL    INTRAOPERATIVE FINDINGS:  Dense intra-abdominal adhesions.  Tethering of sigmoid colon to left ovary    Ricardo Acosta MD

## 2017-04-25 NOTE — ANESTHESIA CARE TRANSFER NOTE
Patient: Leisa Leong    Procedure(s):  OPERATIVE HYSTEROSCOPY(THIS IS FIRST), POSSIBLE POLYPECTOMY/MYOMECTOMY WITH ALVARADO AND NEPHEW MORCELLATION, MYOMECTOMY WITH ULTRASOUND GUIDANCE,  LAPAROSCOPIC LYSIS OF ADHESIONS, LEFT OVARIAN CYSTECTOMY AND POSSIBLE LAPAROTOMY(PORTABLE HD TOWER) - Wound Class: II-Clean Contaminated   - Wound Class: II-Clean Contaminated   - Wound Class: II-Clean Contaminated    Diagnosis: OVARIAN CYST AND UTERINE FIBROID   Diagnosis Additional Information: No value filed.    Anesthesia Type:   General, ETT     Note:  Airway :Face Mask  Patient transferred to:Phase II  Comments: . Spontaneous resp with tidal volume >400ml.Purposeful. Strong tongue thrust. Extubated with cuff down. Pt maintains resp. O2 sat > 97%. Simple face mask on with 10l O2.To PACU: VSS, placed on monitors with alarms on. Report given to RN.;       Vitals: (Last set prior to Anesthesia Care Transfer)    CRNA VITALS  4/25/2017 1004 - 4/25/2017 1035      4/25/2017             Resp Rate (observed): (!)  2    Resp Rate (set): 10                Electronically Signed By: KIM Herrera CRNA  April 25, 2017  10:35 AM

## 2017-04-26 ENCOUNTER — APPOINTMENT (OUTPATIENT)
Dept: ULTRASOUND IMAGING | Facility: CLINIC | Age: 32
End: 2017-04-26
Attending: OBSTETRICS & GYNECOLOGY
Payer: COMMERCIAL

## 2017-04-26 VITALS
RESPIRATION RATE: 16 BRPM | WEIGHT: 222.6 LBS | OXYGEN SATURATION: 98 % | HEIGHT: 67 IN | TEMPERATURE: 98.6 F | DIASTOLIC BLOOD PRESSURE: 57 MMHG | SYSTOLIC BLOOD PRESSURE: 104 MMHG | BODY MASS INDEX: 34.94 KG/M2

## 2017-04-26 LAB — COPATH REPORT: NORMAL

## 2017-04-26 PROCEDURE — 25000132 ZZH RX MED GY IP 250 OP 250 PS 637: Performed by: OBSTETRICS & GYNECOLOGY

## 2017-04-26 PROCEDURE — 93971 EXTREMITY STUDY: CPT | Mod: RT

## 2017-04-26 PROCEDURE — 40000934 ZZH STATISTIC OUTPATIENT (NON-OBS) DAY

## 2017-04-26 RX ORDER — AMOXICILLIN 250 MG
2 CAPSULE ORAL ONCE
Status: COMPLETED | OUTPATIENT
Start: 2017-04-26 | End: 2017-04-26

## 2017-04-26 RX ORDER — AMOXICILLIN 250 MG
1 CAPSULE ORAL 2 TIMES DAILY
Qty: 60 TABLET | Refills: 1 | Status: SHIPPED | OUTPATIENT
Start: 2017-04-26 | End: 2017-08-29

## 2017-04-26 RX ADMIN — SENNOSIDES AND DOCUSATE SODIUM 2 TABLET: 8.6; 5 TABLET ORAL at 13:12

## 2017-04-26 RX ADMIN — OXYCODONE HYDROCHLORIDE AND ACETAMINOPHEN 1 TABLET: 5; 325 TABLET ORAL at 14:05

## 2017-04-26 RX ADMIN — OXYCODONE HYDROCHLORIDE AND ACETAMINOPHEN 2 TABLET: 5; 325 TABLET ORAL at 03:13

## 2017-04-26 RX ADMIN — OXYCODONE HYDROCHLORIDE AND ACETAMINOPHEN 2 TABLET: 5; 325 TABLET ORAL at 08:00

## 2017-04-26 NOTE — DISCHARGE INSTRUCTIONS
Same Day Surgery Discharge Instructions for  Sedation and General Anesthesia       It's not unusual to feel dizzy, light-headed or faint for up to 24 hours after surgery or while taking pain medication.  If you have these symptoms: sit for a few minutes before standing and have someone assist you when you get up to walk or use the bathroom.      You should rest and relax for the next 24 hours. We recommend you make arrangements to have an adult stay with you for at least 24 hours after your discharge.  Avoid hazardous and strenuous activity.      DO NOT DRIVE any vehicle or operate mechanical equipment for 24 hours following the end of your surgery.  Even though you may feel normal, your reactions may be affected by the medication you have received.      Do not drink alcoholic beverages for 24 hours following surgery.       Slowly progress to your regular diet as you feel able. It's not unusual to feel nauseated and/or vomit after receiving anesthesia.  If you develop these symptoms, drink clear liquids (apple juice, ginger ale, broth, 7-up, etc. ) until you feel better.  If your nausea and vomiting persists for 24 hours, please notify your surgeon.        All narcotic pain medications, along with inactivity and anesthesia, can cause constipation. Drinking plenty of liquids and increasing fiber intake will help.      For any questions of a medical nature, call your surgeon.      Do not make important decisions for 24 hours.      If you had general anesthesia, you may have a sore throat for a couple of days related to the breathing tube used during surgery.  You may use Cepacol lozenges to help with this discomfort.  If it worsens or if you develop a fever, contact your surgeon.       If you feel your pain is not well managed with the pain medications prescribed by your surgeon, please contact your surgeon's office to let them know so they can address your concerns.       [facility]  D&C OR  Laparoscopy  Discharge Instructions    ACTIVITY:  You may restart normal activities as your abdominal discomfort disappears.  You may expect some discomfort under the ribs and shoulder area for the first 24 hours.  In nearly all cases, this will disappear shortly after the first day.  It is certainly permissible to climb stairs, shower, and do ordinary, quiet activities.  More vigorous activities such as sports, intercourse and work may be resumed in 48-72 hours as seems to befit your situation.    OFFICE VISIT:  Please call a day or two after your surgery to make an appointment in approximately 2 weeks to discuss the results of your surgery and have your check-up.    VAGINAL DISCHARGE:  You may have some bloody vaginal discharge for as long as one week.  Ordinary tampons may be used after 3-4 days.  Avoid super absorbent tampons.    TEMPERATURE:  If you develop a temperature elevation of 101 F or higher, please call our office immediately.    RESTRICTIONS:  Due to the effects of general anesthesia, please do not drive a car, drink alcoholic beverages, nor operate complex machinery in the first 24 hours following surgery.    PLEASE FEEL FREE TO CALL OUR OFFICE IF ANY QUESTIONS OR PROBLEMS ARISE.    [facility]    Mitchell Medellin M.D.  Marc Stapleton M.D.   Scar Benítez M.D.  EL Minaya M.D.   Angle Enciso M.D.  Nubia Gregorio M.D.  SHASHA Merritt M.D.   EL Goodson M.D.   _________________________________________________________________________________                   [facility]              [address]  [facility] [address]  [address]            [facility] [address]  [place] [address]            [phone] (Telephone)                                               [phone] (Telephone)            [phone]  (Fax)  [phone] (Fax)            [facility]        While you were at the hospital today you were given 1000 mg of Tylenol at 6:10am. The recommended daily maximum dose is 4000 mg.       While you were at the hospital today you received Toradol, an antiinflammatory medication similar to Ibuprofen.  You should not take other antiinflammatory medication, such as Ibuprofen, Motrin, Advil, Aleve, Naprosyn, etc, until 6:30.

## 2017-04-26 NOTE — PLAN OF CARE
Problem: Discharge Planning  Goal: Discharge Planning (Adult, OB, Behavioral, Peds)  Outcome: Adequate for Discharge Date Met:  04/26/17  A&Ox4. Up independently. Tolerating regular diet, denies nausea. VSS on RA. C/o 4-5/10, given percocet prn and ice applied. 3 lap sites C/D/I. No vaginal bleeding per pt. Up to BR voiding adequately.      Pt given discharge instructions. Questions answered. Discharge medications given and reviewed with patient. Follow up appointment to be scheduled by pt. Pt to DC home with .

## 2017-04-26 NOTE — PROGRESS NOTES
RUE doppler negative for DVT.  Now passing small amount of gas, obinna reg diet, no nausea.  Abd mod distended.    Plan d/c this evening, discussed call if increased nausea or pain, use senna, minimize narcotic analgesia.   Terri Chilel MD  Obstetrics, Gynecology, and Infertility  04/26/2017

## 2017-04-26 NOTE — PLAN OF CARE
Problem: Goal Outcome Summary  Goal: Goal Outcome Summary  Outcome: Improving  A&O x4. VSS on RA. Pain to abdomen controlled with PO medication and ice. Passing gas. BS active. LS clear. CMS intact. No vaginal bleeding. Up with SBA and independent in room. Voiding adequately. Tolerating regular diet. Lap sites x3, CDI. IV removed d/t pt request. Plan for discharge home today.

## 2017-04-26 NOTE — PROGRESS NOTES
[facility] Gynecology Post-Op / Progress Note         Assessment and Plan:    Assessment:   Post-operative day #1  LSC, TITUS, Ovarian cystectomy, hysteroscopy.     Slow return of bowel function  Right upper extremity swelling      Plan:   RUE doppler  Ambulate  Will reassess in afternoon for discharge             Interval History:   Doing well, tolerating reg diet without nausea, voiding with some hesitancy but this is improving, not yet passing gas.  Noticing warmth and swelling in the right arm.  She had a lot of swelling in her hand yesterday but that has improved.  Family hx of VTE.  She does not currently have an IV in the right arm.          Significant Problems:    None          Review of Systems:    The patient denies any chest pain, shortness of breath, excessive pain, fever, chills, purulent drainage from the wound, nausea or vomiting.          Medications:   All medications related to the patient's surgery have been reviewed          Physical Exam:   All vitals stable  Wound clean and dry with minimal or no drainage, bandaids in place.  Surrounding skin with minimal erythema/bruising.  Asymmetric edema RUE>LUE.  No palpable cords.  Some erythema present, mild.  There is 1+ edema in bilat LE, symmetric.     Terri Chilel MD  OGI

## 2017-04-27 NOTE — OP NOTE
DATE OF PROCEDURE:  04/25/2017      PREOPERATIVE DIAGNOSES:   1.  Pelvic adhesions.   2.  Left ovarian cyst.   3.  Uterine polyp versus fibroid.      POSTOPERATIVE DIAGNOSES:   1.  Pelvic adhesions.   2.  Bilateral ovarian cysts.   3.  Normal-appearing uterine cavity.      PROCEDURES:   1.  Hysteroscopy, dilation and curettage under ultrasound guidance.   2.  Laparoscopy with lysis of adhesions.   3.  Bilateral ovarian cystectomy.      SURGEON:  Nubia Gregorio MD   COSURGEON:  Ricardo Acosta MD      ANESTHESIA:  General endotracheal anesthesia and local.      ESTIMATED BLOOD LOSS:  15 mL.      COMPLICATIONS:  None apparent.      SPECIMENS:   1.  Endometrial curettings.   2.  Right ovarian cyst.   3.  Left ovarian cyst.      OPERATIVE INDICATIONS:  Leisa Leong is a 32-year-old para 0-2-1-1 with a long-standing history of uterine fibroids and polyps.  Evaluation for this also revealed new complex left ovarian cyst.  Saline infusion sonogram revealed difficult inflation of the uterine cavity however, it did appear that there was a possible polyp versus fibroid at the fundus.  The patient has a history of previous laparoscopy showing a dense pelvic adhesions into the left cornua and left adnexal region; therefore, General Surgery assistance was requested for further surgical management of these adhesions and to access the ovarian cyst.  Risks, benefits and alternatives were discussed, including the risk of bleeding, infection and damage to surrounding structures such as bowel, bladder, blood vessels, ureters, etc.  All questions were answered and informed consent was obtained.      FINDINGS:  On exam under anesthesia, the patient had an anteverted uterus.  Intraoperative ultrasound was performed revealing possibly large simple cyst on the right ovary.  Left ovary was not well seen.  On hysteroscopy, the uterus appeared to have a normal cavity contour with no obvious myoma or polyp tissue noted.  The right tubal ostia  was normal in appearance.  The left tubal ostia was unable to be visualized.  On laparoscopy, there were dense adhesions of the omentum to the anterior abdominal wall and also of the sigmoid and omentum to the left adnexa and posterior uterine sidewall.  The right ovary had a large 3-4 cm simple appearing cyst filled with clear fluid.  The left ovary had approximately 3 cm endometrioma.  The right fallopian tube appeared normal.  The left fallopian tube appeared somewhat enlarged with blunted end.      DESCRIPTION OF PROCEDURE:  The patient was taken to the operating room where general endotracheal anesthesia was undertaken.  She was then prepped and draped in the dorsal lithotomy position following exam under anesthesia.  Intraoperative ultrasound was performed.  Speculum was placed in the vagina and single-tooth tenaculum placed on the anterior lip of the cervix.  The cervix was dilated using Crook dilators under ultrasound guidance to ensure that the dilation led to the uterine cavity given that she had a history of uterine perforation.  This was performed without difficulty to the 17 Trinidadian dilator.  A 5 mm hysteroscope was then advanced into the uterine cavity again under ultrasound guidance without difficulty and the cavity was examined for the above-noted findings.  There was no obvious polyp or myoma tissue noted.  [employer] TruClear device was advanced into the uterine cavity and gentle visually guided curettage was performed revealing a normal cavity contour.  The right tubal ostia was normal in appearance.  The left was still unable to be identified.  The hysteroscope was removed with minimal fluid deficit.  [place] uterine manipulator was then placed and speculum was removed.  The bladder was straight catheterized.  Attention was then turned to the abdomen.  At this point, Dr. Acosta entered the abdomen using a left upper quadrant port.  Please see his operative report for further details.  He also  placed the umbilical port and a left lower quadrant port.  Extensive lysis of adhesions was performed by Dr. Acosta for approximately 90 minutes mobilizing the left adnexa and freeing the area of the adhesions.  The left ovary was densely tethered to the sigmoid colon and the fallopian tube was able to be visualized following his lysis of adhesions.  Attention was then turned to the cystectomy.  First, the right ovary was grasped and a small incision made on the ovarian capsule using the EndoShears.  The ovarian cyst wall was then identified and easily removed in its entirety.  This was sent for formal pathology.  The area was copiously irrigated and hemostasis was noted at the cystectomy site.  Attention was then turned to the left ovary.  Again, using the EndoShears, an incision was made over the ovarian cyst using the EndoShears and the cyst was drained of chocolate-appearing fluid consistent with likely endometrioma.  A portion of the cyst wall was removed; however, this cyst wall in its entirety was not able to be completely removed.  This was also sent for formal pathology.  This area was also copiously irrigated and hemostasis ensured.  The sites of the lysis of adhesions were irrigated and again hemostasis noted.  The instruments and ports were removed under direct visualization.  The abdomen was desufflated.  Skin was reapproximated using 4-0 Monocryl in a subcuticular fashion.  Steri-Strips and Band-Aids were placed.  The instruments were removed from the vagina with hemostasis noted at the tenaculum site.  The patient tolerated the procedure well.  All sponge, lap and instrument counts were correct x3.  She was taken to recovery room in stable condition.  She did receive 2 grams of Ancef prior to the procedure for prophylaxis.         DAGO CHEATHAM MD             D: 04/26/2017 13:24   T: 04/26/2017 20:23   MT: [id number]      Name:     ZEENAT ALVAREZ   MRN:      [id number]        Account:         [id number]   :      1985           Procedure Date: 2017      Document: [id number]

## 2017-05-09 ENCOUNTER — TRANSFERRED RECORDS (OUTPATIENT)
Dept: HEALTH INFORMATION MANAGEMENT | Facility: CLINIC | Age: 32
End: 2017-05-09

## 2017-07-28 ENCOUNTER — TRANSFERRED RECORDS (OUTPATIENT)
Dept: HEALTH INFORMATION MANAGEMENT | Facility: CLINIC | Age: 32
End: 2017-07-28

## 2017-08-29 ENCOUNTER — OFFICE VISIT (OUTPATIENT)
Dept: FAMILY MEDICINE | Facility: CLINIC | Age: 32
End: 2017-08-29
Payer: COMMERCIAL

## 2017-08-29 VITALS
HEART RATE: 90 BPM | TEMPERATURE: 99.2 F | BODY MASS INDEX: 30.21 KG/M2 | OXYGEN SATURATION: 98 % | WEIGHT: 204 LBS | SYSTOLIC BLOOD PRESSURE: 124 MMHG | DIASTOLIC BLOOD PRESSURE: 64 MMHG | HEIGHT: 69 IN

## 2017-08-29 DIAGNOSIS — F32.5 MAJOR DEPRESSION IN COMPLETE REMISSION (H): ICD-10-CM

## 2017-08-29 DIAGNOSIS — J30.89 CHRONIC NONSEASONAL ALLERGIC RHINITIS DUE TO OTHER ALLERGEN: ICD-10-CM

## 2017-08-29 DIAGNOSIS — J02.9 SORE THROAT: Primary | ICD-10-CM

## 2017-08-29 DIAGNOSIS — J45.40 MODERATE PERSISTENT ASTHMA WITHOUT COMPLICATION: ICD-10-CM

## 2017-08-29 LAB
DEPRECATED S PYO AG THROAT QL EIA: NORMAL
SPECIMEN SOURCE: NORMAL

## 2017-08-29 PROCEDURE — 87880 STREP A ASSAY W/OPTIC: CPT | Performed by: FAMILY MEDICINE

## 2017-08-29 PROCEDURE — 87081 CULTURE SCREEN ONLY: CPT | Performed by: FAMILY MEDICINE

## 2017-08-29 PROCEDURE — 99214 OFFICE O/P EST MOD 30 MIN: CPT | Performed by: FAMILY MEDICINE

## 2017-08-29 ASSESSMENT — PATIENT HEALTH QUESTIONNAIRE - PHQ9: SUM OF ALL RESPONSES TO PHQ QUESTIONS 1-9: 1

## 2017-08-29 NOTE — MR AVS SNAPSHOT
After Visit Summary   8/29/2017    Leisa Leong    MRN: [id number]           Patient Information     Date Of Birth          1985        Visit Information        Provider Department      8/29/2017 10:40 AM Fred[facility] Jr., [facility]        Today's Diagnoses     Sore throat    -  1    Major depression in complete remission (H)        Chronic seasonal allergic rhinitis due to pollen           Follow-ups after your visit        Additional Services     ALLERGY/ASTHMA ADULT REFERRAL       Your provider has referred you to: [facility]: [facility] [place] [phone]   [email or web address]  [facility]: [facility] - [place] [phone]   [email or web address]    Please be aware that coverage of these services is subject to the terms and limitations of your health insurance plan.  Call member services at your health plan with any benefit or coverage questions.      Please bring the following with you to your appointment:    (1) Any X-Rays, CTs or MRIs which have been performed.  Contact the facility where they were done to arrange for  prior to your scheduled appointment.    (2) List of current medications  (3) This referral request   (4) Any documents/labs given to you for this referral                  Follow-up notes from your care team     Return if symptoms worsen or fail to improve.      Who to contact     If you have questions or need follow up information about today's clinic visit or your schedule please contact [facility] directly at [phone].  Normal or non-critical lab and imaging results will be communicated to you by MyChart, letter or phone within 4 business days after the clinic has received the results. If you do not hear from us within 7 days, please contact the clinic through MyChart or phone. If you have a critical or abnormal lab result, we will notify you by phone as soon as  "possible.  Submit refill requests through [employer] or call your pharmacy and they will forward the refill request to us. Please allow 3 business days for your refill to be completed.          Additional Information About Your Visit        Pharm[employer]cinehart Information     [employer] gives you secure access to your electronic health record. If you see a primary care provider, you can also send messages to your care team and make appointments. If you have questions, please call your primary care clinic.  If you do not have a primary care provider, please call [phone] and they will assist you.        Care EveryWhere ID     This is your Care EveryWhere ID. This could be used by other organizations to access your [place] medical records  [id number]        Your Vitals Were     Pulse Temperature Height Pulse Oximetry BMI (Body Mass Index)       90 99.2  F (37.3  C) (Tympanic) 5' 8.5\" (1.74 m) 98% 30.57 kg/m2        Blood Pressure from Last 3 Encounters:   08/29/17 124/64   04/26/17 104/57   03/14/17 138/88    Weight from Last 3 Encounters:   08/29/17 204 lb (92.5 kg)   04/25/17 222 lb 9.6 oz (101 kg)   03/14/17 215 lb (97.5 kg)              We Performed the Following     ALLERGY/ASTHMA ADULT REFERRAL     Beta strep group A culture     Strep, Rapid Screen        Primary Care Provider Office Phone # Fax #    Nubia Felicia Gregorio [phone]       [facility] [address]  [place] [address]        Equal Access to Services     [facility] : Hadii aad ku hadasho Soomaali, waaxda luqadaha, qaybta kaalmada adeegyada, waxay idiin hayelizan marika dennis . So [facility] [phone].    ATENCIÓN: Si habla chataañol, tiene a prado disposición servicios gratuitos de asistencia lingüística. Llame al [phone].    We comply with applicable federal civil rights laws and [place] laws. We do not discriminate on the basis of race, color, national origin, age, disability sex, sexual orientation or gender " identity.            Thank you!     Thank you for choosing [facility]  for your care. Our goal is always to provide you with excellent care. Hearing back from our patients is one way we can continue to improve our services. Please take a few minutes to complete the written survey that you may receive in the mail after your visit with us. Thank you!             Your Updated Medication List - Protect others around you: Learn how to safely use, store and throw away your medicines at www.disposemymeds.org.          This list is accurate as of: 8/29/17 11:31 AM.  Always use your most recent med list.                   Brand Name Dispense Instructions for use Diagnosis    albuterol 108 (90 BASE) MCG/ACT Inhaler    PROAIR HFA/PROVENTIL HFA/VENTOLIN HFA     Inhale 2 puffs into the lungs daily as needed for shortness of breath / dyspnea or wheezing        butalbital-acetaminophen-caffeine -40 MG per tablet    FIORICET/ESGIC     Take 1 tablet by mouth every 4 hours as needed for headaches or migraine Reported on 3/14/2017        carboxymethylcellulose 0.5 % Soln ophthalmic solution    REFRESH PLUS     Place 1 drop into both eyes 2 times daily as needed for dry eyes        cetirizine 10 MG tablet    zyrTEC     Take 10 mg by mouth At Bedtime        fluticasone-salmeterol 115-21 MCG/ACT Inhaler    ADVAIR-HFA     Inhale 2 puffs into the lungs 2 times daily as needed        IBUPROFEN PO      Take 600-800 mg by mouth 3 times daily as needed for moderate pain        Multi-vitamin Tabs tablet      Take 1 tablet by mouth daily        oxyCODONE-acetaminophen 5-325 MG per tablet    PERCOCET    30 tablet    Take 1-2 tablets by mouth every 4 hours as needed for pain (moderate to severe)    Right ovarian cyst, Post-op pain       senna-docusate 8.6-50 MG per tablet    SENOKOT-S;PERICOLACE    60 tablet    Take 1 tablet by mouth 2 times daily    Drug-induced constipation       TYLENOL PO      Take 1,000 mg by mouth every 6  hours as needed Reported on 3/14/2017        VITAMIN D (CHOLECALCIFEROL) PO      Take 1 tablet by mouth daily

## 2017-08-29 NOTE — NURSING NOTE
"Chief Complaint   Patient presents with     Pharyngitis     Initial /64 (BP Location: Right arm, Patient Position: Chair, Cuff Size: Adult Regular)  Pulse 90  Temp 99.2  F (37.3  C) (Tympanic)  Ht 5' 8.5\" (1.74 m)  Wt 204 lb (92.5 kg)  SpO2 98%  BMI 30.57 kg/m2 Estimated body mass index is 30.57 kg/(m^2) as calculated from the following:    Height as of this encounter: 5' 8.5\" (1.74 m).    Weight as of this encounter: 204 lb (92.5 kg).  BP completed using cuff size regular right Arm  [facility]    "

## 2017-08-29 NOTE — LETTER
My Asthma Action Plan  Name: Leisa Leong   YOB: 1985  Date: 8/29/2017   My doctor: Freddy Boland Jr, MD   My clinic: [facility]        My Control Medicine: Fluticasone + salmeterol (Advair) -  /21 mcg 2 puffs 2 times daily  My Rescue Medicine: Albuterol (Proair/Ventolin/Proventil) inhaler 2 puffs every 6 hours as needed   My Asthma Severity: moderate persistent  Avoid your asthma triggers: unaware               GREEN ZONE   Good Control    I feel good    No cough or wheeze    Can work, sleep and play without asthma symptoms       Take your asthma control medicine every day.     1. If exercise triggers your asthma, take your rescue medication    15 minutes before exercise or sports, and    During exercise if you have asthma symptoms  2. Spacer to use with inhaler: If you have a spacer, make sure to use it with your inhaler             YELLOW ZONE Getting Worse  I have ANY of these:    I do not feel good    Cough or wheeze    Chest feels tight    Wake up at night   1. Keep taking your Green Zone medications  2. Start taking your rescue medicine:    every 20 minutes for up to 1 hour. Then every 4 hours for 24-48 hours.  3. If you stay in the Yellow Zone for more than 12-24 hours, contact your doctor.  4. If you do not return to the Green Zone in 12-24 hours or you get worse, start taking your oral steroid medicine if prescribed by your provider.           RED ZONE Medical Alert - Get Help  I have ANY of these:    I feel awful    Medicine is not helping    Breathing getting harder    Trouble walking or talking    Nose opens wide to breathe       1. Take your rescue medicine NOW  2. If your provider has prescribed an oral steroid medicine, start taking it NOW  3. Call your doctor NOW  4. If you are still in the Red Zone after 20 minutes and you have not reached your doctor:    Take your rescue medicine again and    Call 911 or go to the emergency room right away    See your  regular doctor within 2 weeks of an Emergency Room or Urgent Care visit for follow-up treatment.        Electronically signed by: Justine Alvares, August 29, 2017    Annual Reminders:  Meet with Asthma Educator,  Flu Shot in the Fall, consider Pneumonia Vaccination for patients with asthma (aged 19 and older).    Pharmacy:    [facility] [place], [place] - [address]  [facility] [id number] [place] [facility], [place] - [address]                    Asthma Triggers  How To Control Things That Make Your Asthma Worse    Triggers are things that make your asthma worse.  Look at the list below to help you find your triggers and what you can do about them.  You can help prevent asthma flare-ups by staying away from your triggers.      Trigger                                                          What you can do   Cigarette Smoke  Tobacco smoke can make asthma worse. Do not allow smoking in your home, car or around you.  Be sure no one smokes at a child s day care or school.  If you smoke, ask your health care provider for ways to help you quit.  Ask family members to quit too.  Ask your health care provider for a referral to Quit Plan to help you quit smoking, or call 4-264-182-PLAN.     Colds, Flu, Bronchitis  These are common triggers of asthma. Wash your hands often.  Don t touch your eyes, nose or mouth.  Get a flu shot every year.     Dust Mites  These are tiny bugs that live in cloth or carpet. They are too small to see. Wash sheets and blankets in hot water every week.   Encase pillows and mattress in dust mite proof covers.  Avoid having carpet if you can. If you have carpet, vacuum weekly.   Use a dust mask and HEPA vacuum.   Pollen and Outdoor Mold  Some people are allergic to trees, grass, or weed pollen, or molds. Try to keep your windows closed.  Limit time out doors when pollen count is high.   Ask you health care provider about taking medicine during allergy season.     Animal Dander  Some people  are allergic to skin flakes, urine or saliva from pets with fur or feathers. Keep pets with fur or feathers out of your home.    If you can t keep the pet outdoors, then keep the pet out of your bedroom.  Keep the bedroom door closed.  Keep pets off cloth furniture and away from stuffed toys.     Mice, Rats, and Cockroaches  Some people are allergic to the waste from these pests.   Cover food and garbage.  Clean up spills and food crumbs.  Store grease in the refrigerator.   Keep food out of the bedroom.   Indoor Mold  This can be a trigger if your home has high moisture. Fix leaking faucets, pipes, or other sources of water.   Clean moldy surfaces.  Dehumidify basement if it is damp and smelly.   Smoke, Strong Odors, and Sprays  These can reduce air quality. Stay away from strong odors and sprays, such as perfume, powder, hair spray, paints, smoke incense, paint, cleaning products, candles and new carpet.   Exercise or Sports  Some people with asthma have this trigger. Be active!  Ask your doctor about taking medicine before sports or exercise to prevent symptoms.    Warm up for 5-10 minutes before and after sports or exercise.     Other Triggers of Asthma  Cold air:  Cover your nose and mouth with a scarf.  Sometimes laughing or crying can be a trigger.  Some medicines and food can trigger asthma.

## 2017-08-29 NOTE — LETTER
My Asthma Action Plan  Name: Leisa Leong   YOB: 1985  Date: 8/29/2017   My doctor: Freddy Boland Jr, MD   My clinic: [facility]        My Control Medicine: Fluticasone + salmeterol (Advair) -  /21 mcg 2 puffs twice daily  My Rescue Medicine: Albuterol (Proair/Ventolin/Proventil) inhaler 2 puffs every four hours as needed   My Asthma Severity: moderate persistent  Avoid your asthma triggers: upper respiratory infections, pollens and animal dander               GREEN ZONE   Good Control    I feel good    No cough or wheeze    Can work, sleep and play without asthma symptoms       Take your asthma control medicine every day.     1. If exercise triggers your asthma, take your rescue medication    15 minutes before exercise or sports, and    During exercise if you have asthma symptoms  2. Spacer to use with inhaler: If you have a spacer, make sure to use it with your inhaler             YELLOW ZONE Getting Worse  I have ANY of these:    I do not feel good    Cough or wheeze    Chest feels tight    Wake up at night   1. Keep taking your Green Zone medications  2. Start taking your rescue medicine:    every 20 minutes for up to 1 hour. Then every 4 hours for 24-48 hours.  3. If you stay in the Yellow Zone for more than 12-24 hours, contact your doctor.  4. If you do not return to the Green Zone in 12-24 hours or you get worse, start taking your oral steroid medicine if prescribed by your provider.           RED ZONE Medical Alert - Get Help  I have ANY of these:    I feel awful    Medicine is not helping    Breathing getting harder    Trouble walking or talking    Nose opens wide to breathe       1. Take your rescue medicine NOW  2. If your provider has prescribed an oral steroid medicine, start taking it NOW  3. Call your doctor NOW  4. If you are still in the Red Zone after 20 minutes and you have not reached your doctor:    Take your rescue medicine again and    Call 911 or go  to the emergency room right away    See your regular doctor within 2 weeks of an Emergency Room or Urgent Care visit for follow-up treatment.        Electronically signed by: Freddy Boland Jr, August 29, 2017    Annual Reminders:  Meet with Asthma Educator,  Flu Shot in the Fall, consider Pneumonia Vaccination for patients with asthma (aged 19 and older).    Pharmacy:    [facility] [place] - [address]  [facility] [id number] [place] [facility] [place] [facility] [address]                    Asthma Triggers  How To Control Things That Make Your Asthma Worse    Triggers are things that make your asthma worse.  Look at the list below to help you find your triggers and what you can do about them.  You can help prevent asthma flare-ups by staying away from your triggers.      Trigger                                                          What you can do   Cigarette Smoke  Tobacco smoke can make asthma worse. Do not allow smoking in your home, car or around you.  Be sure no one smokes at a child s day care or school.  If you smoke, ask your health care provider for ways to help you quit.  Ask family members to quit too.  Ask your health care provider for a referral to Quit Plan to help you quit smoking, or call 1-894-247-PLAN.     Colds, Flu, Bronchitis  These are common triggers of asthma. Wash your hands often.  Don t touch your eyes, nose or mouth.  Get a flu shot every year.     Dust Mites  These are tiny bugs that live in cloth or carpet. They are too small to see. Wash sheets and blankets in hot water every week.   Encase pillows and mattress in dust mite proof covers.  Avoid having carpet if you can. If you have carpet, vacuum weekly.   Use a dust mask and HEPA vacuum.   Pollen and Outdoor Mold  Some people are allergic to trees, grass, or weed pollen, or molds. Try to keep your windows closed.  Limit time out doors when pollen count is high.   Ask you health care provider about taking medicine during  allergy season.     Animal Dander  Some people are allergic to skin flakes, urine or saliva from pets with fur or feathers. Keep pets with fur or feathers out of your home.    If you can t keep the pet outdoors, then keep the pet out of your bedroom.  Keep the bedroom door closed.  Keep pets off cloth furniture and away from stuffed toys.     Mice, Rats, and Cockroaches  Some people are allergic to the waste from these pests.   Cover food and garbage.  Clean up spills and food crumbs.  Store grease in the refrigerator.   Keep food out of the bedroom.   Indoor Mold  This can be a trigger if your home has high moisture. Fix leaking faucets, pipes, or other sources of water.   Clean moldy surfaces.  Dehumidify basement if it is damp and smelly.   Smoke, Strong Odors, and Sprays  These can reduce air quality. Stay away from strong odors and sprays, such as perfume, powder, hair spray, paints, smoke incense, paint, cleaning products, candles and new carpet.   Exercise or Sports  Some people with asthma have this trigger. Be active!  Ask your doctor about taking medicine before sports or exercise to prevent symptoms.    Warm up for 5-10 minutes before and after sports or exercise.     Other Triggers of Asthma  Cold air:  Cover your nose and mouth with a scarf.  Sometimes laughing or crying can be a trigger.  Some medicines and food can trigger asthma.

## 2017-08-29 NOTE — PROGRESS NOTES
SUBJECTIVE:                                                    Leisa Leong is a 32 year old female who presents to clinic today for the following health issues:        Acute Illness   Acute illness concerns: sore throat  Onset: 1 day     Fever: no    Chills/Sweats: no    Headache (location?): YES    Sinus Pressure:no    Conjunctivitis:  no    Ear Pain: YES- clogged uncomfortable    Rhinorrhea: no    Congestion: YES    Sore Throat: YES     Cough: no    Wheeze: no    Decreased Appetite: no    Nausea: YES    Vomiting: no    Diarrhea:  no    Dysuria/Freq.: no    Fatigue/Achiness: no    Sick/Strep Exposure: no     Therapies Tried and outcome: none  All above symptoms except sore throat started today.  sore throat began at 2200 last night.         Depression Followup    Status since last visit: Stable - resolved in fact    See PHQ-9 for current symptoms.  Other associated symptoms: None    Complicating factors:   Significant life event:  No   Current substance abuse:  None  Anxiety or Panic symptoms:  No    PHQ-9  English  PHQ-9   Any Language  Asthma Follow-Up    Was ACT completed today?    Yes    ACT Total Scores 8/29/2017   ACT TOTAL SCORE (Goal Greater than or Equal to 20) 18   In the past 12 months, how many times did you visit the emergency room for your asthma without being admitted to the hospital? 0   In the past 12 months, how many times were you hospitalized overnight because of your asthma? 0       Recent asthma triggers that patient is dealing with: dust mites, pollens and animal dander              Problem list and histories reviewed & adjusted, as indicated.  Additional history: as documented    BP Readings from Last 3 Encounters:   08/29/17 124/64   04/26/17 104/57   03/14/17 138/88    Wt Readings from Last 3 Encounters:   08/29/17 204 lb (92.5 kg)   04/25/17 222 lb 9.6 oz (101 kg)   03/14/17 215 lb (97.5 kg)                  Labs reviewed in EPIC      ROS:  Constitutional, HEENT, cardiovascular,  "pulmonary, gi and gu systems are negative, except as otherwise noted.      OBJECTIVE:   /64 (BP Location: Right arm, Patient Position: Chair, Cuff Size: Adult Regular)  Pulse 90  Temp 99.2  F (37.3  C) (Tympanic)  Ht 5' 8.5\" (1.74 m)  Wt 204 lb (92.5 kg)  SpO2 98%  BMI 30.57 kg/m2  Body mass index is 30.57 kg/(m^2).  GENERAL: healthy, alert and no distress  EYES: Eyes grossly normal to inspection, PERRL and conjunctivae and sclerae normal  HENT: ear canals and TM's normal, nose and mouth without ulcers or lesions  NECK: no adenopathy, no asymmetry, masses, or scars and thyroid normal to palpation  RESP: lungs clear to auscultation - no rales, rhonchi or wheezes  CV: regular rate and rhythm, normal S1 S2, no S3 or S4, no murmur, click or rub, no peripheral edema and peripheral pulses strong  ABDOMEN: soft, nontender, no hepatosplenomegaly, no masses and bowel sounds normal  MS: no gross musculoskeletal defects noted, no edema  PSYCH: mentation appears normal, affect normal/bright    Diagnostic Test Results:  Results for orders placed or performed in visit on 08/29/17 (from the past 24 hour(s))   Strep, Rapid Screen   Result Value Ref Range    Specimen Description Throat     Rapid Strep A Screen       NEGATIVE: No Group A streptococcal antigen detected by immunoassay, await culture report.       ASSESSMENT/PLAN:             1. Sore throat  likely viral.  Advised of OTC options for symptomatic treatment. Return to clinic in 10-14 days if not improving, sooner if worsens.   - Strep, Rapid Screen  - Beta strep group A culture    2. Major depression in complete remission (H)  This continues to do very well off of antidepressant medications. We will continue to follow this clinically.    3. Moderate persistent asthma without complication  She admits to type not taking her Advair on a regular basis, instead focusing mainly on her albuterol when needed.  She is instructed to restart her Advair, rinsing her mouth " out after each use. She is also referred to allergy per her request to determine what specifically she is allergic to.  - ALLERGY/ASTHMA ADULT REFERRAL  - ASTHMA EDUCATION REFERRAL    4. Chronic nonseasonal allergic rhinitis due to other allergen  See above.  - ALLERGY/ASTHMA ADULT REFERRAL    See Patient Instructions    Freddy Boland Jr, MD  [facility]

## 2017-08-29 NOTE — LETTER
My Depression Action Plan  Name: Leisa Leong   Date of Birth 1985  Date: 8/29/2017    My doctor: Nubia Gregorio   My clinic: [facility] [place]  [address]  [place] [address] [phone]          GREEN    ZONE   Good Control    What it looks like:     Things are going generally well. You have normal up s and down s. You may even feel depressed from time to time, but bad moods usually last less than a day.   What you need to do:  1. Continue to care for yourself (see self care plan)  2. Check your depression survival kit and update it as needed  3. Follow your physician s recommendations including any medication.  4. Do not stop taking medication unless you consult with your physician first.           YELLOW         ZONE Getting Worse    What it looks like:     Depression is starting to interfere with your life.     It may be hard to get out of bed; you may be starting to isolate yourself from others.    Symptoms of depression are starting to last most all day and this has happened for several days.     You may have suicidal thoughts but they are not constant.   What you need to do:     1. Call your care team, your response to treatment will improve if you keep your care team informed of your progress. Yellow periods are signs an adjustment may need to be made.     2. Continue your self-care, even if you have to fake it!    3. Talk to someone in your support network    4. Open up your depression survival kit           RED    ZONE Medical Alert - Get Help    What it looks like:     Depression is seriously interfering with your life.     You may experience these or other symptoms: You can t get out of bed most days, can t work or engage in other necessary activities, you have trouble taking care of basic hygiene, or basic responsibilities, thoughts of suicide or death that will not go away, self-injurious behavior.     What you need to do:  1. Call your care team and request  a same-day appointment. If they are not available (weekends or after hours) call your local crisis line, emergency room or 911.      Electronically signed by: Justine Alvares, August 29, 2017    Depression Self Care Plan / Survival Kit    Self-Care for Depression  Here s the deal. Your body and mind are really not as separate as most people think.  What you do and think affects how you feel and how you feel influences what you do and think. This means if you do things that people who feel good do, it will help you feel better.  Sometimes this is all it takes.  There is also a place for medication and therapy depending on how severe your depression is, so be sure to consult with your medical provider and/ or Behavioral Health Consultant if your symptoms are worsening or not improving.     In order to better manage my stress, I will:    Exercise  Get some form of exercise, every day. This will help reduce pain and release endorphins, the  feel good  chemicals in your brain. This is almost as good as taking antidepressants!  This is not the same as joining a gym and then never going! (they count on that by the way ) It can be as simple as just going for a walk or doing some gardening, anything that will get you moving.      Hygiene   Maintain good hygiene (Get out of bed in the morning, Make your bed, Brush your teeth, Take a shower, and Get dressed like you were going to work, even if you are unemployed).  If your clothes don't fit try to get ones that do.    Diet  I will strive to eat foods that are good for me, drink plenty of water, and avoid excessive sugar, caffeine, alcohol, and other mood-altering substances.  Some foods that are helpful in depression are: complex carbohydrates, B vitamins, flaxseed, fish or fish oil, fresh fruits and vegetables.    Psychotherapy  I agree to participate in Individual Therapy (if recommended).    Medication  If prescribed medications, I agree to take them.  Missing doses can  result in serious side effects.  I understand that drinking alcohol, or other illicit drug use, may cause potential side effects.  I will not stop my medication abruptly without first discussing it with my provider.    Staying Connected With Others  I will stay in touch with my friends, family members, and my primary care provider/team.    Use your imagination  Be creative.  We all have a creative side; it doesn t matter if it s oil painting, sand castles, or mud pies! This will also kick up the endorphins.    Witness Beauty  (AKA stop and smell the roses) Take a look outside, even in mid-winter. Notice colors, textures. Watch the squirrels and birds.     Service to others  Be of service to others.  There is always someone else in need.  By helping others we can  get out of ourselves  and remember the really important things.  This also provides opportunities for practicing all the other parts of the program.    Humor  Laugh and be silly!  Adjust your TV habits for less news and crime-drama and more comedy.    Control your stress  Try breathing deep, massage therapy, biofeedback, and meditation. Find time to relax each day.     My support system    Clinic Contact:  Phone number:    Contact 1:  Phone number:    Contact 2:  Phone number:    Church/:  Phone number:    Therapist:  Phone number:    Local crisis center:    Phone number:    Other community support:  Phone number:

## 2017-08-30 LAB
BACTERIA SPEC CULT: NORMAL
SPECIMEN SOURCE: NORMAL

## 2017-08-30 ASSESSMENT — ASTHMA QUESTIONNAIRES: ACT_TOTALSCORE: 18

## 2017-08-30 NOTE — PROGRESS NOTES
Ms. Leong,    -All of your labs are normal.    If you have further questions about the interpretation of your labs, [email or web address] is a good website to check out for further information.    Please contact the clinic if you have additional questions.  Thank you.    Sincerely,    Freddy Boland MD

## 2017-09-01 ENCOUNTER — HEALTH MAINTENANCE LETTER (OUTPATIENT)
Age: 32
End: 2017-09-01

## 2017-09-01 LAB
C TRACH DNA SPEC QL PROBE+SIG AMP: NEGATIVE
HEP C HIM: NORMAL
N GONORRHOEA DNA SPEC QL PROBE+SIG AMP: NEGATIVE
SPECIMEN DESCRIP: NORMAL
SPECIMEN DESCRIPTION: NORMAL

## 2017-10-05 ENCOUNTER — TELEPHONE (OUTPATIENT)
Dept: FAMILY MEDICINE | Facility: CLINIC | Age: 32
End: 2017-10-05

## 2017-10-05 NOTE — TELEPHONE ENCOUNTER
Needs of attention regarding:  -Asthma    Health Maintenance Topics with due status: Overdue       Topic Date Due    PAP Q1 YR 04/09/2013    INFLUENZA VACCINE (SYSTEM ASSIGNED) 09/01/2017       Communication:  See Letter

## 2017-10-05 NOTE — LETTER
[facility]  [address]  [facility] [id number] [phone]  October 5, 2017    Leisa Leong  [address]  [facility] [address]    Dear Leisa,    I care about your health and have reviewed your health plan. I have reviewed your medical conditions, medication list, and lab results and am making recommendations based on this review, to better manage your health.    You are in particular need of attention regarding:  -Asthma    I am recommending that you:  -Complete and return the attached ASTHMA CONTROL TEST.  If your total score is 19 or less or you have been to the ER or urgent care for your asthma, then please schedule an asthma followup appointment.      Here is a list of Health Maintenance topics that are due now or due soon:  Health Maintenance Due   Topic Date Due     PAP Q1 YR  04/09/2013     INFLUENZA VACCINE (SYSTEM ASSIGNED)  09/01/2017       Please call us at [phone] (or use [facility] [employer]) to address the above recommendations.     Thank you for trusting [facility] and we appreciate the opportunity to serve you.  We look forward to supporting your healthcare needs in the future.    Healthy Regards,    WILLIAM Quiles

## 2017-10-07 ENCOUNTER — TRANSFERRED RECORDS (OUTPATIENT)
Dept: HEALTH INFORMATION MANAGEMENT | Facility: CLINIC | Age: 32
End: 2017-10-07

## 2017-10-19 ENCOUNTER — TRANSFERRED RECORDS (OUTPATIENT)
Dept: HEALTH INFORMATION MANAGEMENT | Facility: CLINIC | Age: 32
End: 2017-10-19

## 2017-10-24 ENCOUNTER — TRANSFERRED RECORDS (OUTPATIENT)
Dept: HEALTH INFORMATION MANAGEMENT | Facility: CLINIC | Age: 32
End: 2017-10-24

## 2017-11-07 ENCOUNTER — OFFICE VISIT (OUTPATIENT)
Dept: FAMILY MEDICINE | Facility: CLINIC | Age: 32
End: 2017-11-07
Payer: COMMERCIAL

## 2017-11-07 VITALS
HEIGHT: 69 IN | SYSTOLIC BLOOD PRESSURE: 98 MMHG | HEART RATE: 78 BPM | TEMPERATURE: 98.7 F | DIASTOLIC BLOOD PRESSURE: 58 MMHG | BODY MASS INDEX: 28.88 KG/M2 | OXYGEN SATURATION: 97 % | WEIGHT: 195 LBS

## 2017-11-07 DIAGNOSIS — Z23 NEED FOR PROPHYLACTIC VACCINATION AND INOCULATION AGAINST INFLUENZA: ICD-10-CM

## 2017-11-07 DIAGNOSIS — R22.32 LUMP IN ARMPIT, LEFT: Primary | ICD-10-CM

## 2017-11-07 PROCEDURE — 90471 IMMUNIZATION ADMIN: CPT | Performed by: PHYSICIAN ASSISTANT

## 2017-11-07 PROCEDURE — 99213 OFFICE O/P EST LOW 20 MIN: CPT | Mod: 25 | Performed by: PHYSICIAN ASSISTANT

## 2017-11-07 PROCEDURE — 90686 IIV4 VACC NO PRSV 0.5 ML IM: CPT | Performed by: PHYSICIAN ASSISTANT

## 2017-11-07 NOTE — PATIENT INSTRUCTIONS
Lump may be due to irritation from shaving.  Please avoid for the interim.  Given recurrence of same side, however, from 2 months ago would proceed with further eval with diagnostic mammogram and ultrasound of this spot.  Follow-up pending results.    Electronically Signed By: Juany Santoro PA-C

## 2017-11-07 NOTE — PROGRESS NOTES

## 2017-11-07 NOTE — NURSING NOTE
"Chief Complaint   Patient presents with     Mass     painful lump under left armpit       Initial BP 98/58  Pulse 78  Temp 98.7  F (37.1  C) (Oral)  Ht 5' 8.5\" (1.74 m)  Wt 195 lb (88.5 kg)  SpO2 97%  Breastfeeding? No  BMI 29.22 kg/m2 Estimated body mass index is 29.22 kg/(m^2) as calculated from the following:    Height as of this encounter: 5' 8.5\" (1.74 m).    Weight as of this encounter: 195 lb (88.5 kg).  Medication Reconciliation: complete    "

## 2017-11-07 NOTE — PROGRESS NOTES
SUBJECTIVE:   Leisa Leong is a 32 year old female who presents to clinic today for the following health issues:      Concern - lump under her left armpit   Onset: noticed three days ago but 2 months ago it was there before and went away and got better.  Shaves armpits - has newer cartridge on her razor; tries to replace this every 2 weeks or so.  Newer deodorant in the past 1 month. Keeps trying different ones to see if they are better at covering up odor.  Doesn't feel she sweats a lot, but more of a odor.   No breast, lump or masses. No nipple discharge.  Fhx: maternal grandma with breast cancer, but this was in her 90's and was told not genetic.  Normal mammogram about 1 yr ago.  LMP: 10/22  Working with OB/GYN on fertility treatments. Is up to date on her paps. Followed by [facility]. Will obtain records.    Has a mole on her R thigh and has a derm appt in 3 weeks to have this checked.       Description:   Painful lump underneath her left armpit    Intensity: moderate    Progression of Symptoms:  same    Accompanying Signs & Symptoms: swelling, tenderness when she shaves over it.      Previous history of similar problem: yes same problem 2 months ago, but felt it totally resolved without any residual lump.      Precipitating factors:   Worsened by: applying pressure    Alleviating factors:  Improved by: none    Therapies Tried and outcome: none      Problem list and histories reviewed & adjusted, as indicated.  Additional history: as documented    Patient Active Problem List   Diagnosis     Other acne     Major depression in complete remission (H)     Panic disorder without agoraphobia     S/P myomectomies- multiple 4/19/2012      Health Care Home     Living will, counseling/discussion     IUFD (intrauterine fetal death)     Allergic rhinitis     Right ovarian cyst     Endometriosis---fibroids     Incompetent cervix in pregnancy     Incompetent cervix     fetal bilateral club foot, antepartum     S/P laparoscopy      Moderate persistent asthma without complication     Past Surgical History:   Procedure Laterality Date     C LASIK (EMP) W OPT MYOPIA -3.12  2009    bilaterally     CERCLAGE CERVICAL N/A 12/10/2014    Procedure: CERCLAGE CERVICAL;  Surgeon: Marc Stapleton MD;  Location: [facility]     CERCLAGE CERVICAL N/A 2015    Procedure: CERCLAGE CERVICAL;  Surgeon: Mj Blum MD;  Location: [facility]      SECTION N/A 3/4/2015    Procedure:  SECTION;  Surgeon: Elle Howell MD;  Location: [facility]     CYSTECTOMY OVARIAN BENIGN  2013    Procedure: CYSTECTOMY OVARIAN BENIGN;;  Surgeon: Marc Stapleton MD;  Location:  OR     DILATION AND CURETTAGE       DILATION AND CURETTAGE SUCTION  1/10/2013    Procedure: DILATION AND CURETTAGE SUCTION;  Suction D & C with Intraoperative Ultrasound;  Surgeon: Nubia Gregorio MD;  Location:  OR     DILATION AND CURETTAGE, OPERATIVE HYSTEROSCOPY WITH MORCELLATOR, COMBINED N/A 2016    Procedure: COMBINED DILATION AND CURETTAGE, OPERATIVE HYSTEROSCOPY WITH MORCELLATOR;  Surgeon: Nubia Gregorio MD;  Location: [facility]     EXCISE LESION PERINEAL  2014    Procedure: EXCISE LESION PERINEAL;  Surgeon: Marc Stapleton MD;  Location: [facility]     GYN SURGERY      Laparotomy     HC CREATE EARDRUM OPENING,GEN ANESTH  infant    P.E. Tubes     HC HYSTEROSCOPY, SURGICAL; W/ REMOVAL LEIOMYOMATA  2007    4 cm fibroid     HC REMOVAL OF TONSILS,12+ Y/O  3/2008    Tonsils 12+y.o.     HC TOOTH EXTRACTION W/FORCEP  3/2007    wisdom teeth     HYSTEROSCOPY,REMOVE MYOMA  2008     LAPAROSCOPIC CYSTECTOMY OVARIAN (BENIGN) Left 2017    Procedure: LAPAROSCOPIC CYSTECTOMY OVARIAN (BENIGN);;  Surgeon: Nubia Gregorio MD;  Location:  OR     LAPAROSCOPIC LYSIS ADHESIONS N/A 2016    Procedure: LAPAROSCOPIC LYSIS ADHESIONS;  Surgeon: Nubia Gregorio MD;  Location: [facility]     LAPAROSCOPIC TUBAL DYE STUDY  2014    Procedure: LAPAROSCOPIC TUBAL  DYE STUDY;  Surgeon: Marc Stapleton MD;  Location: [facility]     LAPAROSCOPIC TUBAL DYE STUDY  8/9/2016    Procedure: LAPAROSCOPIC TUBAL DYE STUDY;  Surgeon: Nubia Gregorio MD;  Location: [facility]     LAPAROSCOPY DIAGNOSTIC (GYN) N/A 4/25/2017    Procedure: LAPAROSCOPY DIAGNOSTIC (GYN);;  Surgeon: Nubia Gregorio MD;  Location:  OR     LASIK BILATERAL Bilateral 2008     MYOMECTOMY UTERUS  4/4/2013    Procedure: MYOMECTOMY UTERUS;  PELVIC LAPAROTOMY, Multiple MYOMECTOMY, RIGHT OVARIAN CYSTECTOMY, placement of seprafilm;  Surgeon: Marc Stapleton MD;  Location:  OR     OPERATIVE HYSTEROSCOPY WITH MORCELLATOR  6/12/2014    Procedure: OPERATIVE HYSTEROSCOPY WITH MORCELLATOR (ALVARADO & NEPHEW);  Surgeon: Marc Stapleton MD;  Location: [facility]     OPERATIVE HYSTEROSCOPY WITH MORCELLATOR N/A 9/23/2016    Procedure: OPERATIVE HYSTEROSCOPY WITH MORCELLATOR (ALVARADO & NEPHEW);  Surgeon: Nubia Gregorio MD;  Location: [facility]     OPERATIVE HYSTEROSCOPY WITH MORCELLATOR N/A 4/25/2017    Procedure: OPERATIVE HYSTEROSCOPY WITH MORCELLATOR (ALVARADO & NEPHEW);  HYSTEROSCOPY DILATION AND CURATTAGE WITH ULTRASOUND GUIDANCE,  LAPAROSCOPIC LYSIS OF ADHESIONS, LAPAROSCOPIC BILATERAL OVARIAN CYSTECTOMY;  Surgeon: Nubia Gregorio MD;  Location:  OR     pending  2/21/2011    removal of uterine fibroids     REMOVE CERCLAGE N/A 3/4/2015    Procedure: REMOVE CERCLAGE;  Surgeon: Elle Howell MD;  Location:  L+D     wisdom teeth       wisdom teeth extraction         Social History   Substance Use Topics     Smoking status: Former Smoker     Packs/day: 0.25     Years: 13.00     Quit date: 9/4/2012     Smokeless tobacco: Never Used      Comment: socially - 1 x per week maximum      Alcohol use 0.0 oz/week     0 Standard drinks or equivalent per week      Comment: social     Family History   Problem Relation Age of Onset     Depression Mother      Hypertension Father      Depression Father      CANCER Father      skin  "    DIABETES Maternal Grandmother      Depression Maternal Grandfather      Depression Paternal Grandmother      C.A.D. No family hx of      Breast Cancer No family hx of      Cancer - colorectal No family hx of          Current Outpatient Prescriptions   Medication Sig Dispense Refill     albuterol (PROAIR HFA/PROVENTIL HFA/VENTOLIN HFA) 108 (90 BASE) MCG/ACT Inhaler Inhale 2 puffs into the lungs daily as needed for shortness of breath / dyspnea or wheezing       cetirizine (ZYRTEC) 10 MG tablet Take 10 mg by mouth At Bedtime       IBUPROFEN PO Take 600-800 mg by mouth 3 times daily as needed for moderate pain       VITAMIN D, CHOLECALCIFEROL, PO Take 1 tablet by mouth daily       multivitamin, therapeutic with minerals (MULTI-VITAMIN) TABS tablet Take 1 tablet by mouth daily       fluticasone-salmeterol (ADVAIR-HFA) 115-21 MCG/ACT inhaler Inhale 2 puffs into the lungs 2 times daily as needed        Acetaminophen (TYLENOL PO) Take 1,000 mg by mouth every 6 hours as needed Reported on 3/14/2017       Allergies   Allergen Reactions     Morphine Sulfate Anxiety     Tape [Adhesive Tape] Rash     Vicodin [Hydrocodone-Acetaminophen] Other (See Comments)     insomnia     Latex      SENSITIVITY TO LATEX GLOVES - \"HANDS FEEL SWOLLEN AFTER USING\"         Reviewed and updated as needed this visit by clinical staffTobacco  Allergies  Meds  Med Hx  Surg Hx  Fam Hx  Soc Hx      Reviewed and updated as needed this visit by Provider  Tobacco  Allergies  Meds  Med Hx  Surg Hx  Fam Hx  Soc Hx        ROS:  Constitutional, breast, skin systems are negative, except as otherwise noted.      OBJECTIVE:   BP 98/58  Pulse 78  Temp 98.7  F (37.1  C) (Oral)  Ht 5' 8.5\" (1.74 m)  Wt 195 lb (88.5 kg)  SpO2 97%  Breastfeeding? No  BMI 29.22 kg/m2  Body mass index is 29.22 kg/(m^2).  GENERAL: healthy, alert and no distress  BREAST: normal without masses, tenderness or nipple discharge and no palpable axillary masses or " adenopathy. Does have small superficial mildly tender lump noted at center of L axilla, but no overlying redness, fluctuance or induration. No drainage.     Diagnostic Test Results:  none     ASSESSMENT/PLAN:       ICD-10-CM    1. Lump in armpit, left R22.32 MA Diagnostic Digital Bilateral     US Axillary Left   2. Need for prophylactic vaccination and inoculation against influenza Z23 FLU VAC, SPLIT VIRUS IM > 3 YO (QUADRIVALENT) [72924]     Vaccine Administration, Initial [63275]   Superficial lump seems more consistent with secondary irritation from shaving/irritated gland, but given pt reports this is the second episode over the past 2 months did advise further eval with diagnostic mammogram and L axillary ultrasound.  Pt in agreement with plan.  Flu vaccine updated while here today as well.   See Patient Instructions  Patient Instructions   Lump may be due to irritation from shaving.  Please avoid for the interim.  Given recurrence of same side, however, from 2 months ago would proceed with further eval with diagnostic mammogram and ultrasound of this spot.  Follow-up pending results.    Electronically Signed By: Juany Santoro PA-C

## 2017-11-07 NOTE — MR AVS SNAPSHOT
After Visit Summary   11/7/2017    Leisa Leong    MRN: [id number]           Patient Information     Date Of Birth          1985        Visit Information        Provider Department      11/7/2017 11:20 AM Juany Santoro PA-C [facility]        Today's Diagnoses     Lump in armpit, left    -  1      Care Instructions    Lump may be due to irritation from shaving.  Please avoid for the interim.  Given recurrence of same side, however, from 2 months ago would proceed with further eval with diagnostic mammogram and ultrasound of this spot.  Follow-up pending results.    Electronically Signed By: Juany Santoro PA-C            Follow-ups after your visit        Future tests that were ordered for you today     Open Future Orders        Priority Expected Expires Ordered    MA Diagnostic Digital Bilateral Routine  11/7/2018 11/7/2017    US Axillary Left Routine  11/7/2018 11/7/2017            Who to contact     If you have questions or need follow up information about today's clinic visit or your schedule please contact [facility] directly at [phone].  Normal or non-critical lab and imaging results will be communicated to you by Catalyst Mobilehart, letter or phone within 4 business days after the clinic has received the results. If you do not hear from us within 7 days, please contact the clinic through Catalyst Mobilehart or phone. If you have a critical or abnormal lab result, we will notify you by phone as soon as possible.  Submit refill requests through SustainU or call your pharmacy and they will forward the refill request to us. Please allow 3 business days for your refill to be completed.          Additional Information About Your Visit        MyChart Information     [employer] gives you secure access to your electronic health record. If you see a primary care provider, you can also send messages to your care team and make appointments. If you have questions, please call  "your primary care clinic.  If you do not have a primary care provider, please call [phone] and they will assist you.        Care EveryWhere ID     This is your Care EveryWhere ID. This could be used by other organizations to access your [place] medical records  [id number]        Your Vitals Were     Pulse Temperature Height Pulse Oximetry Breastfeeding? BMI (Body Mass Index)    78 98.7  F (37.1  C) (Oral) 5' 8.5\" (1.74 m) 97% No 29.22 kg/m2       Blood Pressure from Last 3 Encounters:   11/07/17 98/58   08/29/17 124/64   04/26/17 104/57    Weight from Last 3 Encounters:   11/07/17 195 lb (88.5 kg)   08/29/17 204 lb (92.5 kg)   04/25/17 222 lb 9.6 oz (101 kg)                 Today's Medication Changes          These changes are accurate as of: 11/7/17 12:04 PM.  If you have any questions, ask your nurse or doctor.               Stop taking these medicines if you haven't already. Please contact your care team if you have questions.     butalbital-acetaminophen-caffeine -40 MG per tablet   Commonly known as:  FIORICET/ESGIC   Stopped by:  Juany Santoro PA-C                    Primary Care Provider Office Phone # Fax #    Nubia Felicia Gregorio [phone]       [facility] [address]  [place] [address]        Equal Access to Services     [facility]: Hadii aad ku hadasho Soomaali, waaxda luqadaha, qaybta kaalmada adeegyada, alexandro dennis . So [facility] [phone].    ATENCIÓN: Si habla español, tiene a prado disposición servicios gratuitos de asistencia lingüística. Llame al [phone].    We comply with applicable federal civil rights laws and [place] laws. We do not discriminate on the basis of race, color, national origin, age, disability, sex, sexual orientation, or gender identity.            Thank you!     Thank you for choosing [facility]  for your care. Our goal is always to provide you with excellent care. " Hearing back from our patients is one way we can continue to improve our services. Please take a few minutes to complete the written survey that you may receive in the mail after your visit with us. Thank you!             Your Updated Medication List - Protect others around you: Learn how to safely use, store and throw away your medicines at www.disposemymeds.org.          This list is accurate as of: 11/7/17 12:04 PM.  Always use your most recent med list.                   Brand Name Dispense Instructions for use Diagnosis    albuterol 108 (90 BASE) MCG/ACT Inhaler    PROAIR HFA/PROVENTIL HFA/VENTOLIN HFA     Inhale 2 puffs into the lungs daily as needed for shortness of breath / dyspnea or wheezing        cetirizine 10 MG tablet    zyrTEC     Take 10 mg by mouth At Bedtime        fluticasone-salmeterol 115-21 MCG/ACT Inhaler    ADVAIR-HFA     Inhale 2 puffs into the lungs 2 times daily as needed        IBUPROFEN PO      Take 600-800 mg by mouth 3 times daily as needed for moderate pain        Multi-vitamin Tabs tablet      Take 1 tablet by mouth daily        TYLENOL PO      Take 1,000 mg by mouth every 6 hours as needed Reported on 3/14/2017        VITAMIN D (CHOLECALCIFEROL) PO      Take 1 tablet by mouth daily

## 2017-11-16 ENCOUNTER — TRANSFERRED RECORDS (OUTPATIENT)
Dept: HEALTH INFORMATION MANAGEMENT | Facility: CLINIC | Age: 32
End: 2017-11-16

## 2017-11-27 ENCOUNTER — OFFICE VISIT (OUTPATIENT)
Dept: FAMILY MEDICINE | Facility: CLINIC | Age: 32
End: 2017-11-27
Payer: COMMERCIAL

## 2017-11-27 VITALS
TEMPERATURE: 98.4 F | WEIGHT: 192 LBS | HEIGHT: 69 IN | DIASTOLIC BLOOD PRESSURE: 58 MMHG | OXYGEN SATURATION: 99 % | SYSTOLIC BLOOD PRESSURE: 124 MMHG | BODY MASS INDEX: 28.44 KG/M2 | HEART RATE: 86 BPM

## 2017-11-27 DIAGNOSIS — R42 DIZZINESS: Primary | ICD-10-CM

## 2017-11-27 DIAGNOSIS — J30.89 CHRONIC NON-SEASONAL ALLERGIC RHINITIS, UNSPECIFIED TRIGGER: ICD-10-CM

## 2017-11-27 DIAGNOSIS — H92.02 LEFT EAR PAIN: ICD-10-CM

## 2017-11-27 LAB
ERYTHROCYTE [DISTWIDTH] IN BLOOD BY AUTOMATED COUNT: 12.2 % (ref 10–15)
HCT VFR BLD AUTO: 38.3 % (ref 35–47)
HGB BLD-MCNC: 13.2 G/DL (ref 11.7–15.7)
MCH RBC QN AUTO: 31.7 PG (ref 26.5–33)
MCHC RBC AUTO-ENTMCNC: 34.5 G/DL (ref 31.5–36.5)
MCV RBC AUTO: 92 FL (ref 78–100)
PLATELET # BLD AUTO: 300 10E9/L (ref 150–450)
RBC # BLD AUTO: 4.16 10E12/L (ref 3.8–5.2)
WBC # BLD AUTO: 8.2 10E9/L (ref 4–11)

## 2017-11-27 PROCEDURE — 36415 COLL VENOUS BLD VENIPUNCTURE: CPT | Performed by: PHYSICIAN ASSISTANT

## 2017-11-27 PROCEDURE — 80048 BASIC METABOLIC PNL TOTAL CA: CPT | Performed by: PHYSICIAN ASSISTANT

## 2017-11-27 PROCEDURE — 83550 IRON BINDING TEST: CPT | Performed by: PHYSICIAN ASSISTANT

## 2017-11-27 PROCEDURE — 85027 COMPLETE CBC AUTOMATED: CPT | Performed by: PHYSICIAN ASSISTANT

## 2017-11-27 PROCEDURE — 83540 ASSAY OF IRON: CPT | Performed by: PHYSICIAN ASSISTANT

## 2017-11-27 PROCEDURE — 99214 OFFICE O/P EST MOD 30 MIN: CPT | Performed by: PHYSICIAN ASSISTANT

## 2017-11-27 PROCEDURE — 84443 ASSAY THYROID STIM HORMONE: CPT | Performed by: PHYSICIAN ASSISTANT

## 2017-11-27 PROCEDURE — 82728 ASSAY OF FERRITIN: CPT | Performed by: PHYSICIAN ASSISTANT

## 2017-11-27 RX ORDER — MONTELUKAST SODIUM 10 MG/1
10 TABLET ORAL AT BEDTIME
Qty: 30 TABLET | Refills: 1 | Status: SHIPPED | OUTPATIENT
Start: 2017-11-27 | End: 2018-01-30

## 2017-11-27 NOTE — MR AVS SNAPSHOT
After Visit Summary   11/27/2017    Leisa Leong    MRN: [id number]           Patient Information     Date Of Birth          1985        Visit Information        Provider Department      11/27/2017 5:40 PM [facility]C [facility]        Today's Diagnoses     Left ear pain - now resolved    -  1    Dizziness          Care Instructions    Hx more suggestive of 2 different things with possible vertigo versus orthostatic hypotension given standing for long periods.  Will check to ensure no anemia, thyroid disease or electrolyte abnormality.  No ear infection and labyrinthitis seems less likely given not ringing in ear or recent URI/viral process.  Take frequent breaks to sit down when able and contract legs consistently to ensure good venous return.   Re-check with on-going concerns.    Electronically Signed By: Juany Santoro PA-C            Follow-ups after your visit        Who to contact     If you have questions or need follow up information about today's clinic visit or your schedule please contact [facility] directly at [phone].  Normal or non-critical lab and imaging results will be communicated to you by Hidden Radiohart, letter or phone within 4 business days after the clinic has received the results. If you do not hear from us within 7 days, please contact the clinic through PureLiFit or phone. If you have a critical or abnormal lab result, we will notify you by phone as soon as possible.  Submit refill requests through Versly or call your pharmacy and they will forward the refill request to us. Please allow 3 business days for your refill to be completed.          Additional Information About Your Visit        Hidden Radiohart Information     [employer] gives you secure access to your electronic health record. If you see a primary care provider, you can also send messages to your care team and make appointments. If you have questions, please call your  "primary care clinic.  If you do not have a primary care provider, please call [phone] and they will assist you.        Care EveryWhere ID     This is your Care EveryWhere ID. This could be used by other organizations to access your [place] medical records  [id number]        Your Vitals Were     Pulse Temperature Height Pulse Oximetry Breastfeeding? BMI (Body Mass Index)    86 98.4  F (36.9  C) (Oral) 5' 8.5\" (1.74 m) 99% No 28.77 kg/m2       Blood Pressure from Last 3 Encounters:   11/27/17 124/58   11/07/17 98/58   08/29/17 124/64    Weight from Last 3 Encounters:   11/27/17 192 lb (87.1 kg)   11/07/17 195 lb (88.5 kg)   08/29/17 204 lb (92.5 kg)              We Performed the Following     Basic metabolic panel  (Ca, Cl, CO2, Creat, Gluc, K, Na, BUN)     CBC with platelets     TSH with free T4 reflex        Primary Care Provider Office Phone # Fax #    Nubia Felicia Gregorio [phone]       [facility] [address]  [place] [address]        Equal Access to Services     ELAINA STOCK : Hadii massiel covarrubiaso Socarli, waaxda luqadaha, qaybta kaalmada adenasreenyada, alexandro dsouza. So [facility] [phone].    ATENCIÓN: Si habla español, tiene a prado disposición servicios gratuitos de asistencia lingüística. Llame al [phone].    We comply with applicable federal civil rights laws and [place] laws. We do not discriminate on the basis of race, color, national origin, age, disability, sex, sexual orientation, or gender identity.            Thank you!     Thank you for choosing [facility]  for your care. Our goal is always to provide you with excellent care. Hearing back from our patients is one way we can continue to improve our services. Please take a few minutes to complete the written survey that you may receive in the mail after your visit with us. Thank you!             Your Updated Medication List - Protect others around you: Learn how to " safely use, store and throw away your medicines at www.disposemymeds.org.          This list is accurate as of: 11/27/17  6:11 PM.  Always use your most recent med list.                   Brand Name Dispense Instructions for use Diagnosis    albuterol 108 (90 BASE) MCG/ACT Inhaler    PROAIR HFA/PROVENTIL HFA/VENTOLIN HFA     Inhale 2 puffs into the lungs daily as needed for shortness of breath / dyspnea or wheezing        cetirizine 10 MG tablet    zyrTEC     Take 10 mg by mouth At Bedtime        fluticasone-salmeterol 115-21 MCG/ACT Inhaler    ADVAIR-HFA     Inhale 2 puffs into the lungs 2 times daily as needed        IBUPROFEN PO      Take 600-800 mg by mouth 3 times daily as needed for moderate pain        Multi-vitamin Tabs tablet      Take 1 tablet by mouth daily        TYLENOL PO      Take 1,000 mg by mouth every 6 hours as needed Reported on 3/14/2017        VITAMIN D (CHOLECALCIFEROL) PO      Take 1 tablet by mouth daily

## 2017-11-27 NOTE — NURSING NOTE
"Chief Complaint   Patient presents with     Ear Problem       Initial Pulse 86  Temp 98.4  F (36.9  C) (Oral)  Ht 5' 8.5\" (1.74 m)  Wt 192 lb (87.1 kg)  SpO2 99%  Breastfeeding? No  BMI 28.77 kg/m2 Estimated body mass index is 28.77 kg/(m^2) as calculated from the following:    Height as of this encounter: 5' 8.5\" (1.74 m).    Weight as of this encounter: 192 lb (87.1 kg).  Medication Reconciliation: complete    "

## 2017-11-27 NOTE — PROGRESS NOTES
SUBJECTIVE:   Leisa Leong is a 32 year old female who presents to clinic today for the following health issues:      Acute Illness   Acute illness concerns: had labyrinthitis in the past and wants to make sure that isn't what it is again. Did have a weird tinge in her ear and dizziness that started yesterday.  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   This happened to her a few times throughout the day and always when she was looking down.  Felt a little sweaty/clammy during the episode, but then felt fine. Had been standing all day and had not sat down once.    States she had a significant work-up including neg head CT in the past and was ultimately diagnosed with labyrinthitis. Then saw another clinician and was diagnosed with an ear infection so isn't sure really what it was, but wants to have ear checked to be sure.    Has been struggling with seasonal allergies - states she ran out of medication though and didn't re-fill it due to the cost as was required to see the specialist. Estimates this was 2 yrs ago. Had managed with anti-histamine like zyrtec, but hasn't taken this for at least 2 months.   States she will have congestion/drainage, sneezing as has known dust mite and cat allergies. Has a cat at home despite this.    Then today on her way home she got L ear pain so figured she should schedule an appt. Lasted for 5 minutes then resolved.   Currently has no symptoms including HA, vision changes, dizziness, vertigo, URI sx, fever, focal weakness, facial drooping, confusion or any focal deficits.  No tinnitus. No syncope.    Hx of anemia and required blood transfusion.   Followed by OB/GYN and reports having normal hgb within the past few months.  Is amenable to having things repeated though.    Onset: started yesterday    Fever: no    Chills/Sweats: no    Headache (location?): no    Sinus Pressure:no    Conjunctivitis:  no    Ear Pain: YES - now  resolved.    Rhinorrhea: no    Congestion: no    Sore Throat: no     Cough: no    Wheeze: no    Decreased Appetite: no    Nausea: no    Vomiting: no    Diarrhea:  no    Dysuria/Freq.: no    Fatigue/Achiness: no    Sick/Strep Exposure: no     Therapies Tried and outcome: none        Problem list and histories reviewed & adjusted, as indicated.  Additional history: as documented    Patient Active Problem List   Diagnosis     Other acne     Major depression in complete remission (H)     Panic disorder without agoraphobia     S/P myomectomies- multiple 2012      Health Care Home     Living will, counseling/discussion     IUFD (intrauterine fetal death)     Allergic rhinitis     Right ovarian cyst     Endometriosis---fibroids     Incompetent cervix in pregnancy     Incompetent cervix     fetal bilateral club foot, antepartum     S/P laparoscopy     Moderate persistent asthma without complication     Past Surgical History:   Procedure Laterality Date     C LASIK (EMP) W OPT MYOPIA -3.12  2009    bilaterally     CERCLAGE CERVICAL N/A 12/10/2014    Procedure: CERCLAGE CERVICAL;  Surgeon: Marc Stapleton MD;  [facility]     CERCLAGE CERVICAL N/A 2015    Procedure: CERCLAGE CERVICAL;  Surgeon: Mj Blum MD;  Location: [facility]      SECTION N/A 3/4/2015    Procedure:  SECTION;  Surgeon: Elle Howell MD;  Location: [facility]     CYSTECTOMY OVARIAN BENIGN  2013    Procedure: CYSTECTOMY OVARIAN BENIGN;;  Surgeon: Marc Stapleton MD;  Location:  [facility]     DILATION AND CURETTAGE       DILATION AND CURETTAGE SUCTION  1/10/2013    Procedure: DILATION AND CURETTAGE SUCTION;  Suction D & C with Intraoperative Ultrasound;  Surgeon: Nubia Gregorio MD;  Location:  OR     DILATION AND CURETTAGE, OPERATIVE HYSTEROSCOPY WITH MORCELLATOR, COMBINED N/A 2016    Procedure: COMBINED DILATION AND CURETTAGE, OPERATIVE HYSTEROSCOPY WITH MORCELLATOR;  Surgeon: Nubia Gregorio MD;   [facility]     EXCISE LESION PERINEAL  6/12/2014    Procedure: EXCISE LESION PERINEAL;  Surgeon: Marc Stapleton MD;  Location: [facility]     GYN SURGERY      Laparotomy     HC CREATE EARDRUM OPENING,GEN ANESTH  infant    P.E. Tubes     HC HYSTEROSCOPY, SURGICAL; W/ REMOVAL LEIOMYOMATA  6/2007    4 cm fibroid     HC REMOVAL OF TONSILS,12+ Y/O  3/2008    Tonsils 12+y.o.     HC TOOTH EXTRACTION W/FORCEP  3/2007    wisdom teeth     HYSTEROSCOPY,REMOVE MYOMA  7/2008     LAPAROSCOPIC CYSTECTOMY OVARIAN (BENIGN) Left 4/25/2017    Procedure: LAPAROSCOPIC CYSTECTOMY OVARIAN (BENIGN);;  Surgeon: Nubia Gregorio MD;  Location:  OR     LAPAROSCOPIC LYSIS ADHESIONS N/A 8/9/2016    Procedure: LAPAROSCOPIC LYSIS ADHESIONS;  Surgeon: Nubia Gregorio MD;  Location: [facility]     LAPAROSCOPIC TUBAL DYE STUDY  6/12/2014    Procedure: LAPAROSCOPIC TUBAL DYE STUDY;  Surgeon: Marc Stapleton MD;  Location: [facility]     LAPAROSCOPIC TUBAL DYE STUDY  8/9/2016    Procedure: LAPAROSCOPIC TUBAL DYE STUDY;  Surgeon: Nubia Gregorio MD;  Location: [facility]     LAPAROSCOPY DIAGNOSTIC (GYN) N/A 4/25/2017    Procedure: LAPAROSCOPY DIAGNOSTIC (GYN);;  Surgeon: Nubia Gregorio MD;  Location: [facility] BILATERAL Bilateral 2008     MYOMECTOMY UTERUS  4/4/2013    Procedure: MYOMECTOMY UTERUS;  PELVIC LAPAROTOMY, Multiple MYOMECTOMY, RIGHT OVARIAN CYSTECTOMY, placement of seprafilm;  Surgeon: Marc Stapleton MD;  Location:  OR     OPERATIVE HYSTEROSCOPY WITH MORCELLATOR  6/12/2014    Procedure: OPERATIVE HYSTEROSCOPY WITH MORCELLATOR (ALVARADO & NEPHEW);  Surgeon: Marc Stapleton MD;  Location: [facility]     OPERATIVE HYSTEROSCOPY WITH MORCELLATOR N/A 9/23/2016    Procedure: OPERATIVE HYSTEROSCOPY WITH MORCELLATOR (ALVARADO & NEPHEW);  Surgeon: Nubia Gregorio MD;  Location: [facility]     OPERATIVE HYSTEROSCOPY WITH MORCELLATOR N/A 4/25/2017    Procedure: OPERATIVE HYSTEROSCOPY WITH MORCELLATOR (ALVARADO & NEPHEW);  HYSTEROSCOPY DILATION  AND CURATTAGE WITH ULTRASOUND GUIDANCE,  LAPAROSCOPIC LYSIS OF ADHESIONS, LAPAROSCOPIC BILATERAL OVARIAN CYSTECTOMY;  Surgeon: Nubia Gregorio MD;  Location: [facility]     pending  2/21/2011    removal of uterine fibroids     REMOVE CERCLAGE N/A 3/4/2015    Procedure: REMOVE CERCLAGE;  Surgeon: Elle Howell MD;  Location: [facility]     wisdom teeth       wisdom teeth extraction         Social History   Substance Use Topics     Smoking status: Former Smoker     Packs/day: 0.25     Years: 13.00     Quit date: 9/4/2012     Smokeless tobacco: Never Used      Comment: socially - 1 x per week maximum      Alcohol use 0.0 oz/week     0 Standard drinks or equivalent per week      Comment: social     Family History   Problem Relation Age of Onset     Depression Mother      Hypertension Father      Depression Father      CANCER Father      skin     DIABETES Maternal Grandmother      Depression Maternal Grandfather      Depression Paternal Grandmother      C.A.D. No family hx of      Breast Cancer No family hx of      Cancer - colorectal No family hx of          Current Outpatient Prescriptions   Medication Sig Dispense Refill     albuterol (PROAIR HFA/PROVENTIL HFA/VENTOLIN HFA) 108 (90 BASE) MCG/ACT Inhaler Inhale 2 puffs into the lungs daily as needed for shortness of breath / dyspnea or wheezing       cetirizine (ZYRTEC) 10 MG tablet Take 10 mg by mouth At Bedtime       IBUPROFEN PO Take 600-800 mg by mouth 3 times daily as needed for moderate pain       VITAMIN D, CHOLECALCIFEROL, PO Take 1 tablet by mouth daily       multivitamin, therapeutic with minerals (MULTI-VITAMIN) TABS tablet Take 1 tablet by mouth daily       fluticasone-salmeterol (ADVAIR-HFA) 115-21 MCG/ACT inhaler Inhale 2 puffs into the lungs 2 times daily as needed        Acetaminophen (TYLENOL PO) Take 1,000 mg by mouth every 6 hours as needed Reported on 3/14/2017       Allergies   Allergen Reactions     Morphine Sulfate Anxiety     Tape  "[Adhesive Tape] Rash     Vicodin [Hydrocodone-Acetaminophen] Other (See Comments)     insomnia     Latex      SENSITIVITY TO LATEX GLOVES - \"HANDS FEEL SWOLLEN AFTER USING\"       Reviewed and updated as needed this visit by clinical staff     Reviewed and updated as needed this visit by Provider         ROS:  Constitutional, HEENT, cardiovascular, pulmonary, gi and gu systems are negative, except as otherwise noted.      OBJECTIVE:   /58  Pulse 86  Temp 98.4  F (36.9  C) (Oral)  Ht 5' 8.5\" (1.74 m)  Wt 192 lb (87.1 kg)  SpO2 99%  Breastfeeding? No  BMI 28.77 kg/m2  Body mass index is 28.77 kg/(m^2).  GENERAL: healthy, alert and no distress  EYES: Eyes grossly normal to inspection, PERRL and conjunctivae and sclerae normal  HENT: ear canals and TM's normal without redness/effusion/bulging, nose and mouth without ulcers or lesions  NECK: no adenopathy, no asymmetry, masses, or scars and thyroid normal to palpation  RESP: lungs clear to auscultation - no rales, rhonchi or wheezes  CV: regular rate and rhythm, normal S1 S2, no S3 or S4, no murmur, click or rub, no peripheral edema and peripheral pulses strong  NEURO: A&Ox3. No facial asymmetry. Cranial nerves grossly intact. Rapid alternating hand movements, finger-to-nose, and heel-to-shin exercises intact. Negative Romberg. Normal strength and tone, mentation intact and speech normal    Diagnostic Test Results:  none     ASSESSMENT/PLAN:       ICD-10-CM    1. Dizziness R42 CBC with platelets     TSH with free T4 reflex     Basic metabolic panel  (Ca, Cl, CO2, Creat, Gluc, K, Na, BUN)     Ferritin     Iron and iron binding capacity   2. Left ear pain - now resolved H92.02    3. Chronic non-seasonal allergic rhinitis, unspecified trigger J30.89 montelukast (SINGULAIR) 10 MG tablet   See Patient Instructions  Pt in agreement with plan.  Pt also requested re-fill of singulair for allergies since this has helped her in the past. Thus, this was " provided.  Patient Instructions   Hx more suggestive of 2 different things with possible vertigo versus orthostatic hypotension given standing for long periods.  Will check to ensure no anemia, thyroid disease or electrolyte abnormality.  No ear infection and labyrinthitis seems less likely given not ringing in ear or recent URI/viral process.  Take frequent breaks to sit down when able and contract legs consistently to ensure good venous return.   Re-check with on-going concerns.    Electronically Signed By: Juany Santoro PA-C

## 2017-11-28 LAB
ANION GAP SERPL CALCULATED.3IONS-SCNC: 12 MMOL/L (ref 3–14)
BUN SERPL-MCNC: 19 MG/DL (ref 7–30)
CALCIUM SERPL-MCNC: 9.4 MG/DL (ref 8.5–10.1)
CHLORIDE SERPL-SCNC: 106 MMOL/L (ref 94–109)
CO2 SERPL-SCNC: 21 MMOL/L (ref 20–32)
CREAT SERPL-MCNC: 0.7 MG/DL (ref 0.52–1.04)
FERRITIN SERPL-MCNC: 60 NG/ML (ref 12–150)
GFR SERPL CREATININE-BSD FRML MDRD: >90 ML/MIN/1.7M2
GLUCOSE SERPL-MCNC: 88 MG/DL (ref 70–99)
IRON SATN MFR SERPL: 20 % (ref 15–46)
IRON SERPL-MCNC: 64 UG/DL (ref 35–180)
POTASSIUM SERPL-SCNC: 3.9 MMOL/L (ref 3.4–5.3)
SODIUM SERPL-SCNC: 139 MMOL/L (ref 133–144)
TIBC SERPL-MCNC: 326 UG/DL (ref 240–430)
TSH SERPL DL<=0.005 MIU/L-ACNC: 2.02 MU/L (ref 0.4–4)

## 2017-11-28 NOTE — PATIENT INSTRUCTIONS
Hx more suggestive of 2 different things with possible vertigo versus orthostatic hypotension given standing for long periods.  Will check to ensure no anemia, thyroid disease or electrolyte abnormality.  No ear infection and labyrinthitis seems less likely given not ringing in ear or recent URI/viral process.  Take frequent breaks to sit down when able and contract legs consistently to ensure good venous return.   Re-check with on-going concerns.    Electronically Signed By: Juany Santoro PA-C

## 2017-11-29 ENCOUNTER — TELEPHONE (OUTPATIENT)
Dept: FAMILY MEDICINE | Facility: CLINIC | Age: 32
End: 2017-11-29

## 2017-11-30 NOTE — PROGRESS NOTES
Please call or write patient with the following results:    -Kidney function is normal (Cr, GFR), Sodium is normal, Potassium is normal, Calcium is normal, Glucose is normal (diabetes screening test).   -TSH (thyroid stimulating hormone) level is normal which indicates normal thyroid function.  -Normal red blood cell (hgb) levels, normal white blood cell count and normal platelet levels.  -Ferritin (iron storage) and serum iron levels are normal.    Electronically Signed By: Juany Santoro PA-C

## 2018-01-17 ENCOUNTER — TELEPHONE (OUTPATIENT)
Dept: FAMILY MEDICINE | Facility: CLINIC | Age: 33
End: 2018-01-17

## 2018-01-17 NOTE — LETTER
[facility]  [address]  [facility] [place], [place] [id number] [phone]  January 17, 2018    Leisa Leong  [address]  [facility] [place] [address]    Dear Leisa,    I care about your health and have reviewed your health plan. I have reviewed your medical conditions, medication list, and lab results and am making recommendations based on this review, to better manage your health.    You are in particular need of attention regarding:  -Asthma    I am recommending that you:  -Complete and return the attached ASTHMA CONTROL TEST.  If your total score is 19 or less or you have been to the ER or urgent care for your asthma, then please schedule an asthma followup appointment.      Here is a list of Health Maintenance topics that are due now or due soon:  Health Maintenance Due   Topic Date Due     PAP Q1 YR  09/16/2015       Please call us at [phone] (or use [employer]) to address the above recommendations.     Thank you for trusting [facility] and we appreciate the opportunity to serve you.  We look forward to supporting your healthcare needs in the future.    Healthy Regards,    WILLIAM Quiles

## 2018-01-17 NOTE — TELEPHONE ENCOUNTER
Needs of attention regarding:  -Asthma    Health Maintenance Topics with due status: Overdue       Topic Date Due    PAP Q1 YR 09/16/2015       Communication:  See Letter and left a voicemail for patient.  Renae Medina MA

## 2018-01-30 DIAGNOSIS — J30.89 CHRONIC NON-SEASONAL ALLERGIC RHINITIS, UNSPECIFIED TRIGGER: ICD-10-CM

## 2018-01-30 NOTE — TELEPHONE ENCOUNTER
"Requested Prescriptions   Pending Prescriptions Disp Refills     montelukast (SINGULAIR) 10 MG tablet  Requesting a 90-Day Supply  Last Written Prescription Date:  11/27/2017  Last Fill Quantity: 30 tablet,  # refills: 1   Last Office Visit with [facility], P or [facility] prescribing provider:  11/27/2017   Future Office Visit:      30 tablet 1     Sig: Take 1 tablet (10 mg) by mouth At Bedtime    Leukotriene Inhibitors Protocol Failed    1/30/2018  8:31 AM       Failed - Asthma control test 20 or greater in past 6 months    Please review ACT score.   ACT Total Scores 8/29/2017 8/29/2017   ACT TOTAL SCORE (Goal Greater than or Equal to 20) - 18   In the past 12 months, how many times did you visit the emergency room for your asthma without being admitted to the hospital? 0 0   In the past 12 months, how many times were you hospitalized overnight because of your asthma? 0 0          Passed - Patient is age 12 or older    If patient is under 16, ok to refill using age based dosing.        Passed - Recent (6 mo) or future visit with authorizing provider's specialty    Patient had office visit in the last 6 months or has a visit in the next 30 days with authorizing provider.  See \"Patient Info\" tab in inbasket, or \"Choose Columns\" in Meds & Orders section of the refill encounter.              "

## 2018-01-31 RX ORDER — MONTELUKAST SODIUM 10 MG/1
10 TABLET ORAL AT BEDTIME
Qty: 90 TABLET | Refills: 2 | Status: SHIPPED | OUTPATIENT
Start: 2018-01-31 | End: 2018-12-04

## 2018-01-31 NOTE — TELEPHONE ENCOUNTER
Recent OV notes reviewed. Prescription approved per [facility] RN Refill Protocol. Tami Serna R.N.

## 2018-08-01 ENCOUNTER — TRANSFERRED RECORDS (OUTPATIENT)
Dept: HEALTH INFORMATION MANAGEMENT | Facility: CLINIC | Age: 33
End: 2018-08-01

## 2018-08-01 LAB — PAP SMEAR - HIM PATIENT REPORTED: NEGATIVE

## 2018-09-07 ENCOUNTER — HEALTH MAINTENANCE LETTER (OUTPATIENT)
Age: 33
End: 2018-09-07

## 2018-12-10 DIAGNOSIS — J30.89 CHRONIC NON-SEASONAL ALLERGIC RHINITIS: ICD-10-CM

## 2018-12-10 NOTE — TELEPHONE ENCOUNTER
"Requested Prescriptions   Pending Prescriptions Disp Refills     montelukast (SINGULAIR) 10 MG tablet     Requesting a 90-Day Supply - Insurance will only cover a 90-day supply.         Last Written Prescription Date:  12/5/2018  Last Fill Quantity: 30 tablet,  # refills: 0   Last office visit: 11/27/2017 with prescribing provider:  Josselin  Patient may have transferred care to [facility].     Future Office Visit:         30 tablet 0     Sig: Take 1 tablet (10 mg) by mouth At Bedtime Due for appt before further refills.    Leukotriene Inhibitors Protocol Failed - 12/10/2018 10:09 AM       Failed - Asthma control assessment score within normal limits in last 6 months    Please review ACT score.     ACT Total Scores 8/29/2017 8/29/2017   ACT TOTAL SCORE (Goal Greater than or Equal to 20) - 18   In the past 12 months, how many times did you visit the emergency room for your asthma without being admitted to the hospital? 0 0   In the past 12 months, how many times were you hospitalized overnight because of your asthma? 0 0          Failed - Recent (6 mo) or future (30 days) visit within the authorizing provider's specialty    Patient had office visit in the last 6 months or has a visit in the next 30 days with authorizing provider or within the authorizing provider's specialty.  See \"Patient Info\" tab in inbasket, or \"Choose Columns\" in Meds & Orders section of the refill encounter.           Passed - Patient is age 12 or older    If patient is under 16, ok to refill using age based dosing.             "

## 2018-12-11 RX ORDER — MONTELUKAST SODIUM 10 MG/1
10 TABLET ORAL AT BEDTIME
Qty: 30 TABLET | Refills: 0 | OUTPATIENT
Start: 2018-12-11

## 2019-04-09 ENCOUNTER — TRANSFERRED RECORDS (OUTPATIENT)
Dept: HEALTH INFORMATION MANAGEMENT | Facility: CLINIC | Age: 34
End: 2019-04-09

## 2019-04-24 ENCOUNTER — TRANSFERRED RECORDS (OUTPATIENT)
Dept: HEALTH INFORMATION MANAGEMENT | Facility: CLINIC | Age: 34
End: 2019-04-24

## 2019-04-30 ENCOUNTER — TRANSFERRED RECORDS (OUTPATIENT)
Dept: HEALTH INFORMATION MANAGEMENT | Facility: CLINIC | Age: 34
End: 2019-04-30

## 2019-05-15 ENCOUNTER — OFFICE VISIT (OUTPATIENT)
Dept: UROLOGY | Facility: CLINIC | Age: 34
End: 2019-05-15
Payer: COMMERCIAL

## 2019-05-15 VITALS
DIASTOLIC BLOOD PRESSURE: 70 MMHG | HEIGHT: 68 IN | WEIGHT: 206 LBS | SYSTOLIC BLOOD PRESSURE: 116 MMHG | OXYGEN SATURATION: 99 % | BODY MASS INDEX: 31.22 KG/M2 | HEART RATE: 83 BPM

## 2019-05-15 DIAGNOSIS — N32.89 BLADDER ADHESIONS: Primary | ICD-10-CM

## 2019-05-15 LAB
ALBUMIN UR-MCNC: NEGATIVE MG/DL
APPEARANCE UR: CLEAR
BILIRUB UR QL STRIP: ABNORMAL
COLOR UR AUTO: YELLOW
GLUCOSE UR STRIP-MCNC: NEGATIVE MG/DL
HGB UR QL STRIP: NEGATIVE
KETONES UR STRIP-MCNC: 80 MG/DL
LEUKOCYTE ESTERASE UR QL STRIP: NEGATIVE
NITRATE UR QL: NEGATIVE
PH UR STRIP: 5 PH (ref 5–7)
SOURCE: ABNORMAL
SP GR UR STRIP: >1.03 (ref 1–1.03)
UROBILINOGEN UR STRIP-ACNC: 0.2 EU/DL (ref 0.2–1)

## 2019-05-15 PROCEDURE — 99204 OFFICE O/P NEW MOD 45 MIN: CPT | Performed by: UROLOGY

## 2019-05-15 PROCEDURE — 81003 URINALYSIS AUTO W/O SCOPE: CPT | Performed by: UROLOGY

## 2019-05-15 RX ORDER — MULTIVITAMIN WITH IRON
1 TABLET ORAL DAILY
COMMUNITY

## 2019-05-15 RX ORDER — GABAPENTIN 300 MG/1
CAPSULE ORAL
Refills: 3 | Status: ON HOLD | COMMUNITY
Start: 2019-03-26 | End: 2019-07-11

## 2019-05-15 RX ORDER — RIZATRIPTAN BENZOATE 5 MG/1
5 TABLET, ORALLY DISINTEGRATING ORAL 2 TIMES DAILY PRN
COMMUNITY
Start: 2019-03-15

## 2019-05-15 ASSESSMENT — PAIN SCALES - GENERAL: PAINLEVEL: NO PAIN (0)

## 2019-05-15 ASSESSMENT — MIFFLIN-ST. JEOR: SCORE: 1672.91

## 2019-05-15 NOTE — LETTER
5/15/2019     RE: Leisa Leong  [address] [facility] [address]     Dear Colleague,    Thank you for referring your patient, Leisa Leong, to the [facility] [place] at [facility]. Please see a copy of my visit note below.    History: It is a great pleasure to see this very pleasant 34-year-old lady in initial consultation today at the request of her OB/GYN.  She has a history extending for 2 to 3 years of increasing pain in the pelvic area.  She has had surgery for lysis of adhesions in the pelvis and her endometriosis in the past, but seems to have very few urinary symptoms and there is no history of frequency or nocturia or dysuria or hematuria or incontinence of urine.  She has a sensation that the bladder is tethered and it causes pain when the bladder is emptying but only on occasions and currently she has no symptoms related to this.  She does have some stressful issues in her private life at the present time.  She is currently taking a ketogenic diet for weight reduction.      Past Medical History:   Diagnosis Date     Anxiety and depression      Asthma     allergy induced     Endometriosis      Fam hx-cardiovas dis NEC     Father     Family history of diabetes mellitus     MGM     Fibroids      Headaches     randomly occurring, much better recently     HPV in female      Hx of previous reproductive problem      Migraine      Motion sickness      Other acne      Palpitations      PONV (postoperative nausea and vomiting)     high anxiety when awakening from anesthesia.....hx in the distant past of difficult intubation - this occurred in 2008...     Seasonal allergies      Spider veins      STD (female)        Past Surgical History:   Procedure Laterality Date     C LASIK (EMP) W OPT MYOPIA -3.12  1/2009    bilaterally     CERCLAGE CERVICAL N/A 12/10/2014    Procedure: CERCLAGE CERVICAL;  Surgeon: Marc Stapleton MD;   Location: [facility]     CERCLAGE CERVICAL N/A 2015    Procedure: CERCLAGE CERVICAL;  Surgeon: Mj Blum MD;  Location: [facility]      SECTION N/A 3/4/2015    Procedure:  SECTION;  Surgeon: Elle Howell MD;  Location: [facility]     CYSTECTOMY OVARIAN BENIGN  2013    Procedure: CYSTECTOMY OVARIAN BENIGN;;  Surgeon: Marc Stapleton MD;  Location:  OR     DILATION AND CURETTAGE       DILATION AND CURETTAGE SUCTION  1/10/2013    Procedure: DILATION AND CURETTAGE SUCTION;  Suction D & C with Intraoperative Ultrasound;  Surgeon: Nubia Gregorio MD;  Location:  OR     DILATION AND CURETTAGE, OPERATIVE HYSTEROSCOPY WITH MORCELLATOR, COMBINED N/A 2016    Procedure: COMBINED DILATION AND CURETTAGE, OPERATIVE HYSTEROSCOPY WITH MORCELLATOR;  Surgeon: Nubia Gregorio MD;  Location: [facility]     EXCISE LESION PERINEAL  2014    Procedure: EXCISE LESION PERINEAL;  Surgeon: Marc Stapleton MD;  Location: [facility]     GYN SURGERY      Laparotomy     HC CREATE EARDRUM OPENING,GEN ANESTH  infant    P.E. Tubes     HC HYSTEROSCOPY, SURGICAL; W/ REMOVAL LEIOMYOMATA  2007    4 cm fibroid     HC REMOVAL OF TONSILS,12+ Y/O  3/2008    Tonsils 10+y.o.     HC TOOTH EXTRACTION W/FORCEP  3/2007    wisdom teeth     HYSTEROSCOPY,REMOVE MYOMA  2008     LAPAROSCOPIC CYSTECTOMY OVARIAN (BENIGN) Left 2017    Procedure: LAPAROSCOPIC CYSTECTOMY OVARIAN (BENIGN);;  Surgeon: Nubia Gregorio MD;  Location:  OR     LAPAROSCOPIC LYSIS ADHESIONS N/A 2016    Procedure: LAPAROSCOPIC LYSIS ADHESIONS;  Surgeon: Nubia Gregorio MD;  Location: [facility]     LAPAROSCOPIC TUBAL DYE STUDY  2014    Procedure: LAPAROSCOPIC TUBAL DYE STUDY;  Surgeon: Marc Stapleton MD;  Location: [facility]     LAPAROSCOPIC TUBAL DYE STUDY  2016    Procedure: LAPAROSCOPIC TUBAL DYE STUDY;  Surgeon: Nubia Gregorio MD;  Location: [facility]     LAPAROSCOPY DIAGNOSTIC (GYN) N/A 2017    Procedure:  LAPAROSCOPY DIAGNOSTIC (GYN);;  Surgeon: Nubia Gregorio MD;  [facility]     [facility]     MYOMECTOMY UTERUS  4/4/2013    Procedure: MYOMECTOMY UTERUS;  PELVIC LAPAROTOMY, Multiple MYOMECTOMY, RIGHT OVARIAN CYSTECTOMY, placement of seprafilm;  Surgeon: Marc Stapleton MD;  Location:  OR     OPERATIVE HYSTEROSCOPY WITH MORCELLATOR  6/12/2014    Procedure: OPERATIVE HYSTEROSCOPY WITH MORCELLATOR (ALVARADO & NEPHEW);  Surgeon: Marc Stapleton MD;  Location:  [facility]     OPERATIVE HYSTEROSCOPY WITH MORCELLATOR N/A 9/23/2016    Procedure: OPERATIVE HYSTEROSCOPY WITH MORCELLATOR (ALVARADO & NEPHEW);  Surgeon: Nubia Gregorio MD;  Location:  [facility]     OPERATIVE HYSTEROSCOPY WITH MORCELLATOR N/A 4/25/2017    Procedure: OPERATIVE HYSTEROSCOPY WITH MORCELLATOR (ALVARADO & NEPHEW);  HYSTEROSCOPY DILATION AND CURATTAGE WITH ULTRASOUND GUIDANCE,  LAPAROSCOPIC LYSIS OF ADHESIONS, LAPAROSCOPIC BILATERAL OVARIAN CYSTECTOMY;  Surgeon: Nubia Gregorio MD;  Location:  OR     pending  2/21/2011    removal of uterine fibroids     REMOVE CERCLAGE N/A 3/4/2015    Procedure: REMOVE CERCLAGE;  Surgeon: Elle Howell MD;  Location:  L+D     wisdom teeth       wisdom teeth extraction         Family History   Problem Relation Age of Onset     Depression Mother      Hypertension Father      Depression Father      Cancer Father         skin     Diabetes Maternal Grandmother      Depression Maternal Grandfather      Depression Paternal Grandmother      C.A.D. No family hx of      Breast Cancer No family hx of      Cancer - colorectal No family hx of        Social History     Socioeconomic History     Marital status:      Spouse name: Not on file     Number of children: 0     Years of education: 18     Highest education level: Not on file   Occupational History     Employer: [employer]     Employer: NONE    Social Needs     Financial resource strain: Not on file     Food insecurity:      Worry: Not on file     Inability: Not on file     Transportation needs:     Medical: Not on file     Non-medical: Not on file   Tobacco Use     Smoking status: Former Smoker     Packs/day: 0.25     Years: 13.00     Pack years: 3.25     Last attempt to quit: 2012     Years since quittin.6     Smokeless tobacco: Never Used     Tobacco comment: socially - 1 x per week maximum    Substance and Sexual Activity     Alcohol use: Yes     Alcohol/week: 0.0 oz     Comment: social     Drug use: No     Sexual activity: Yes     Partners: Male     Birth control/protection: Condom, Pill   Lifestyle     Physical activity:     Days per week: Not on file     Minutes per session: Not on file     Stress: Not on file   Relationships     Social connections:     Talks on phone: Not on file     Gets together: Not on file     Attends Oriental orthodox service: Not on file     Active member of club or organization: Not on file     Attends meetings of clubs or organizations: Not on file     Relationship status: Not on file     Intimate partner violence:     Fear of current or ex partner: Not on file     Emotionally abused: Not on file     Physically abused: Not on file     Forced sexual activity: Not on file   Other Topics Concern      Service No     Blood Transfusions No     Comment: was denied ability to give blood/ 2002     Caffeine Concern Not Asked     Occupational Exposure Not Asked     Hobby Hazards No     Sleep Concern No     Stress Concern No     Weight Concern Yes     Comment: gained on depo     Special Diet No     Back Care Not Asked     Exercise Yes     Bike Helmet Not Asked     Seat Belt Yes     Self-Exams Yes     Parent/sibling w/ CABG, MI or angioplasty before 65F 55M? Not Asked   Social History Narrative     Not on file       Current Outpatient Medications   Medication Sig Dispense Refill     albuterol (PROAIR HFA/PROVENTIL HFA/VENTOLIN HFA) 108 (90 BASE) MCG/ACT Inhaler Inhale 2 puffs into the lungs daily as needed  "for shortness of breath / dyspnea or wheezing       cetirizine (ZYRTEC) 10 MG tablet Take 10 mg by mouth At Bedtime       fluticasone-salmeterol (ADVAIR-HFA) 115-21 MCG/ACT inhaler Inhale 2 puffs into the lungs 2 times daily as needed        gabapentin (NEURONTIN) 300 MG capsule TAKE 1 CAPSULE BY MOUTH EVERYDAY AT BEDTIME  3     IBUPROFEN PO Take 600-800 mg by mouth 3 times daily as needed for moderate pain       magnesium 250 MG tablet Take 1 tablet by mouth daily       montelukast (SINGULAIR) 10 MG tablet Take 1 tablet (10 mg) by mouth At Bedtime Due for appt before further refills. 30 tablet 0     multivitamin, therapeutic with minerals (MULTI-VITAMIN) TABS tablet Take 1 tablet by mouth daily       VITAMIN D, CHOLECALCIFEROL, PO Take 1 tablet by mouth daily       Acetaminophen (TYLENOL PO) Take 1,000 mg by mouth every 6 hours as needed Reported on 3/14/2017       rizatriptan (MAXALT-MLT) 5 MG ODT Place 5 mg under the tongue         Review Of Systems:  Skin: negative  Eyes: negative  Ears/Nose/Throat: negative  Respiratory: No shortness of breath, dyspnea on exertion, cough, or hemoptysis  Cardiovascular: negative  Gastrointestinal: negative  Genitourinary:  endometriosis with pelvic pain and abdominal adhesions  Musculoskeletal: Pelvic pain  Neurologic: negative  Psychiatric: Stress related issues.  Panic disorder with a history of major depression now in remission  Hematologic/Lymphatic/Immunologic: negative  Endocrine: negative    Exam:  /70 (BP Location: Left arm, Patient Position: Sitting, Cuff Size: Adult Regular)   Pulse 83   Ht 1.727 m (5' 8\")   Wt 93.4 kg (206 lb)   SpO2 99%   BMI 31.32 kg/m       General Impression: Very pleasant lady who does not appear in distress today is quite conversational and seems well oriented in time place and person.    Mental status.  Quite normal    HEENT: There is no clinical evidence of jaundice in the mucous membranes are normal the skin is otherwise normal to " examination    Skin: The skin is normal to examination  Lymph Nodes: Normal    Respiratory System: The respiratory cycle is normal    Cardiovascular: There is no significant pitting peripheral edema    Abdominal: Mildly obese abdomen    Extremities: The extremities are unremarkable    Back and Flank: There is no flank tenderness    Genital: Not examined    Rectal: Not examined    Neurologic: There are no focal abnormal clinical neurological signs and central, peripheral nervous systems    Impression: This is clearly a history of some complexity.  She has had a number of laparoscopic procedures to divide adhesions in the pelvis for which she has obtained some relief.  There is history that she also has endometriosis which can also cause problems with pain in the pelvis and pelvic pain.  I think it unlikely that she has a primary urologic cause for this given the lack of any significant urologic symptoms at present.  She does have significant pelvic pain at intervals, although I doubt that tethering of the bladder by adhesions is attributable to this.  There are some significant quite understandable factors such as stress related issues due to the recent death of her father, and the fact that her  is moved out of state for work for the time being although she and her mother will be joining him in [place] later.  These of course will be factors which may be aggravating her current symptoms.  I am going to recommend we proceed as follows.  1.  I had a discussion with her about what to avoid including caffeine, cranberry juice, alcohol and spicy foods.  2.  I am going to encourage her to take regular warm baths which are very effective and rather relaxing the pelvic floor muscles  3.  I am going to arrange for cystoscopy and an ultrasound of the kidneys to evaluate the entire urinary tract carefully.    I did discuss the situation very carefully with her today.  I explained that pelvic pain is can be a complicated  "situation that there may be factors that may be contributing to this which will resolve on their own accord, but that we will complete our investigations and see if there is other issues that we can address that may help.  I answered many questions    Plan: Ultrasound of kidneys and cystoscopy    Time: 30 minutes.  Greater than 50% in discussion consultation    \"This dictation was performed with voice recognition software and may contain errors,  omissions and inadvertent word substitution.\"    Again, thank you for allowing me to participate in the care of your patient.      Sincerely,    Mandeep Bernstein MD      "

## 2019-05-15 NOTE — PROGRESS NOTES
History: It is a great pleasure to see this very pleasant 34-year-old lady in initial consultation today at the request of her OB/GYN.  She has a history extending for 2 to 3 years of increasing pain in the pelvic area.  She has had surgery for lysis of adhesions in the pelvis and her endometriosis in the past, but seems to have very few urinary symptoms and there is no history of frequency or nocturia or dysuria or hematuria or incontinence of urine.  She has a sensation that the bladder is tethered and it causes pain when the bladder is emptying but only on occasions and currently she has no symptoms related to this.  She does have some stressful issues in her private life at the present time.  She is currently taking a ketogenic diet for weight reduction.      Past Medical History:   Diagnosis Date     Anxiety and depression      Asthma     allergy induced     Endometriosis      Fam hx-cardiovas dis NEC     Father     Family history of diabetes mellitus     MGM     Fibroids      Headaches     randomly occurring, much better recently     HPV in female      Hx of previous reproductive problem      Migraine      Motion sickness      Other acne      Palpitations      PONV (postoperative nausea and vomiting)     high anxiety when awakening from anesthesia.....hx in the distant past of difficult intubation - this occurred in ...     Seasonal allergies      Spider veins      STD (female)        Past Surgical History:   Procedure Laterality Date     C LASIK (EMP) W OPT MYOPIA -3.12  2009    bilaterally     CERCLAGE CERVICAL N/A 12/10/2014    Procedure: CERCLAGE CERVICAL;  Surgeon: Marc Stapleton MD;  Location: [facility]     CERCLAGE CERVICAL N/A 2015    Procedure: CERCLAGE CERVICAL;  Surgeon: Mj Blum MD;  [facility]: [facility]      SECTION N/A 3/4/2015    Procedure:  SECTION;  Surgeon: Elle Howell MD;  [facility]: [facility]     CYSTECTOMY OVARIAN BENIGN  2013    Procedure:  CYSTECTOMY OVARIAN BENIGN;;  Surgeon: Marc Stapleton MD;  Location:  OR     DILATION AND CURETTAGE       DILATION AND CURETTAGE SUCTION  1/10/2013    Procedure: DILATION AND CURETTAGE SUCTION;  Suction D & C with Intraoperative Ultrasound;  Surgeon: Nubia Gregorio MD;  Location:  OR     DILATION AND CURETTAGE, OPERATIVE HYSTEROSCOPY WITH MORCELLATOR, COMBINED N/A 8/9/2016    Procedure: COMBINED DILATION AND CURETTAGE, OPERATIVE HYSTEROSCOPY WITH MORCELLATOR;  Surgeon: Nubia Gregorio MD;  Location: [facility]     EXCISE LESION PERINEAL  6/12/2014    Procedure: EXCISE LESION PERINEAL;  Surgeon: Marc Stapleton MD;  Location: [facility]     GYN SURGERY      Laparotomy     HC CREATE EARDRUM OPENING,GEN ANESTH  infant    P.E. Tubes     HC HYSTEROSCOPY, SURGICAL; W/ REMOVAL LEIOMYOMATA  6/2007    4 cm fibroid     HC REMOVAL OF TONSILS,12+ Y/O  3/2008    Tonsils 12+y.o.     HC TOOTH EXTRACTION W/FORCEP  3/2007    wisdom teeth     HYSTEROSCOPY,REMOVE MYOMA  7/2008     LAPAROSCOPIC CYSTECTOMY OVARIAN (BENIGN) Left 4/25/2017    Procedure: LAPAROSCOPIC CYSTECTOMY OVARIAN (BENIGN);;  Surgeon: Nubia Gregorio MD;  Location:  OR     LAPAROSCOPIC LYSIS ADHESIONS N/A 8/9/2016    Procedure: LAPAROSCOPIC LYSIS ADHESIONS;  Surgeon: Nubia Gregorio MD;  Location: [facility]     LAPAROSCOPIC TUBAL DYE STUDY  6/12/2014    Procedure: LAPAROSCOPIC TUBAL DYE STUDY;  Surgeon: Marc Stapleton MD;  Location: [facility]     LAPAROSCOPIC TUBAL DYE STUDY  8/9/2016    Procedure: LAPAROSCOPIC TUBAL DYE STUDY;  Surgeon: Nubia Gregorio MD;  Location: [facility]     LAPAROSCOPY DIAGNOSTIC (GYN) N/A 4/25/2017    Procedure: LAPAROSCOPY DIAGNOSTIC (GYN);;  Surgeon: Nubia Gregorio MD;  Location:  OR     LASIK BILATERAL Bilateral 2008     MYOMECTOMY UTERUS  4/4/2013    Procedure: MYOMECTOMY UTERUS;  PELVIC LAPAROTOMY, Multiple MYOMECTOMY, RIGHT OVARIAN CYSTECTOMY, placement of seprafilm;  Surgeon: Marc Stapleton MD;  Location:   OR     OPERATIVE HYSTEROSCOPY WITH MORCELLATOR  2014    Procedure: OPERATIVE HYSTEROSCOPY WITH MORCELLATOR (ALVARADO & NEPHEW);  Surgeon: Marc Stapleton MD;  Location: [facility]     OPERATIVE HYSTEROSCOPY WITH MORCELLATOR N/A 2016    Procedure: OPERATIVE HYSTEROSCOPY WITH MORCELLATOR (ALVARADO & NEPHEW);  Surgeon: Nubia Gregorio MD;  Location: [facility]     OPERATIVE HYSTEROSCOPY WITH MORCELLATOR N/A 2017    Procedure: OPERATIVE HYSTEROSCOPY WITH MORCELLATOR (ALVARADO & NEPHEW);  HYSTEROSCOPY DILATION AND CURATTAGE WITH ULTRASOUND GUIDANCE,  LAPAROSCOPIC LYSIS OF ADHESIONS, LAPAROSCOPIC BILATERAL OVARIAN CYSTECTOMY;  Surgeon: Nubia Gregorio MD;  Location:  OR     pending  2011    removal of uterine fibroids     REMOVE CERCLAGE N/A 3/4/2015    Procedure: REMOVE CERCLAGE;  Surgeon: Elle Howell MD;  Location:  L[facility]D     wisdom teeth       wisdom teeth extraction         Family History   Problem Relation Age of Onset     Depression Mother      Hypertension Father      Depression Father      Cancer Father         skin     Diabetes Maternal Grandmother      Depression Maternal Grandfather      Depression Paternal Grandmother      C.A.D. No family hx of      Breast Cancer No family hx of      Cancer - colorectal No family hx of        Social History     Socioeconomic History     Marital status:      Spouse name: Not on file     Number of children: 0     Years of education: 18     Highest education level: Not on file   Occupational History     Employer: "[employer]"     Employer: NONE    Social Needs     Financial resource strain: Not on file     Food insecurity:     Worry: Not on file     Inability: Not on file     Transportation needs:     Medical: Not on file     Non-medical: Not on file   Tobacco Use     Smoking status: Former Smoker     Packs/day: 0.25     Years: 13.00     Pack years: 3.25     Last attempt to quit: 2012     Years since quittin.6     Smokeless tobacco:  Never Used     Tobacco comment: socially - 1 x per week maximum    Substance and Sexual Activity     Alcohol use: Yes     Alcohol/week: 0.0 oz     Comment: social     Drug use: No     Sexual activity: Yes     Partners: Male     Birth control/protection: Condom, Pill   Lifestyle     Physical activity:     Days per week: Not on file     Minutes per session: Not on file     Stress: Not on file   Relationships     Social connections:     Talks on phone: Not on file     Gets together: Not on file     Attends Protestant service: Not on file     Active member of club or organization: Not on file     Attends meetings of clubs or organizations: Not on file     Relationship status: Not on file     Intimate partner violence:     Fear of current or ex partner: Not on file     Emotionally abused: Not on file     Physically abused: Not on file     Forced sexual activity: Not on file   Other Topics Concern      Service No     Blood Transfusions No     Comment: was denied ability to give blood/ 2002     Caffeine Concern Not Asked     Occupational Exposure Not Asked     Hobby Hazards No     Sleep Concern No     Stress Concern No     Weight Concern Yes     Comment: gained on depo     Special Diet No     Back Care Not Asked     Exercise Yes     Bike Helmet Not Asked     Seat Belt Yes     Self-Exams Yes     Parent/sibling w/ CABG, MI or angioplasty before 65F 55M? Not Asked   Social History Narrative     Not on file       Current Outpatient Medications   Medication Sig Dispense Refill     albuterol (PROAIR HFA/PROVENTIL HFA/VENTOLIN HFA) 108 (90 BASE) MCG/ACT Inhaler Inhale 2 puffs into the lungs daily as needed for shortness of breath / dyspnea or wheezing       cetirizine (ZYRTEC) 10 MG tablet Take 10 mg by mouth At Bedtime       fluticasone-salmeterol (ADVAIR-HFA) 115-21 MCG/ACT inhaler Inhale 2 puffs into the lungs 2 times daily as needed        gabapentin (NEURONTIN) 300 MG capsule TAKE 1 CAPSULE BY MOUTH EVERYDAY AT  "BEDTIME  3     IBUPROFEN PO Take 600-800 mg by mouth 3 times daily as needed for moderate pain       magnesium 250 MG tablet Take 1 tablet by mouth daily       montelukast (SINGULAIR) 10 MG tablet Take 1 tablet (10 mg) by mouth At Bedtime Due for appt before further refills. 30 tablet 0     multivitamin, therapeutic with minerals (MULTI-VITAMIN) TABS tablet Take 1 tablet by mouth daily       VITAMIN D, CHOLECALCIFEROL, PO Take 1 tablet by mouth daily       Acetaminophen (TYLENOL PO) Take 1,000 mg by mouth every 6 hours as needed Reported on 3/14/2017       rizatriptan (MAXALT-MLT) 5 MG ODT Place 5 mg under the tongue         Review Of Systems:  Skin: negative  Eyes: negative  Ears/Nose/Throat: negative  Respiratory: No shortness of breath, dyspnea on exertion, cough, or hemoptysis  Cardiovascular: negative  Gastrointestinal: negative  Genitourinary:  endometriosis with pelvic pain and abdominal adhesions  Musculoskeletal: Pelvic pain  Neurologic: negative  Psychiatric: Stress related issues.  Panic disorder with a history of major depression now in remission  Hematologic/Lymphatic/Immunologic: negative  Endocrine: negative    Exam:  /70 (BP Location: Left arm, Patient Position: Sitting, Cuff Size: Adult Regular)   Pulse 83   Ht 1.727 m (5' 8\")   Wt 93.4 kg (206 lb)   SpO2 99%   BMI 31.32 kg/m      General Impression: Very pleasant lady who does not appear in distress today is quite conversational and seems well oriented in time place and person.    Mental status.  Quite normal    HEENT: There is no clinical evidence of jaundice in the mucous membranes are normal the skin is otherwise normal to examination    Skin: The skin is normal to examination  Lymph Nodes: Normal    Respiratory System: The respiratory cycle is normal    Cardiovascular: There is no significant pitting peripheral edema    Abdominal: Mildly obese abdomen    Extremities: The extremities are unremarkable    Back and Flank: There is no " flank tenderness    Genital: Not examined    Rectal: Not examined    Neurologic: There are no focal abnormal clinical neurological signs and central, peripheral nervous systems    Impression: This is clearly a history of some complexity.  She has had a number of laparoscopic procedures to divide adhesions in the pelvis for which she has obtained some relief.  There is history that she also has endometriosis which can also cause problems with pain in the pelvis and pelvic pain.  I think it unlikely that she has a primary urologic cause for this given the lack of any significant urologic symptoms at present.  She does have significant pelvic pain at intervals, although I doubt that tethering of the bladder by adhesions is attributable to this.  There are some significant quite understandable factors such as stress related issues due to the recent death of her father, and the fact that her  is moved out of state for work for the time being although she and her mother will be joining him in [place] later.  These of course will be factors which may be aggravating her current symptoms.  I am going to recommend we proceed as follows.  1.  I had a discussion with her about what to avoid including caffeine, cranberry juice, alcohol and spicy foods.  2.  I am going to encourage her to take regular warm baths which are very effective and rather relaxing the pelvic floor muscles  3.  I am going to arrange for cystoscopy and an ultrasound of the kidneys to evaluate the entire urinary tract carefully.    I did discuss the situation very carefully with her today.  I explained that pelvic pain is can be a complicated situation that there may be factors that may be contributing to this which will resolve on their own accord, but that we will complete our investigations and see if there is other issues that we can address that may help.  I answered many questions    Plan: Ultrasound of kidneys and cystoscopy    Time: 30  "minutes.  Greater than 50% in discussion consultation    \"This dictation was performed with voice recognition software and may contain errors,  omissions and inadvertent word substitution.\"    "

## 2019-05-15 NOTE — NURSING NOTE
Chief Complaint   Patient presents with     Clinic Care Coordination - Follow-up     Pt here for bladder adhesions and scar tissue     Shiloh Ruiz, CMA

## 2019-05-23 ENCOUNTER — MEDICAL CORRESPONDENCE (OUTPATIENT)
Dept: HEALTH INFORMATION MANAGEMENT | Facility: CLINIC | Age: 34
End: 2019-05-23

## 2019-05-30 ENCOUNTER — OFFICE VISIT (OUTPATIENT)
Dept: CARDIOLOGY | Facility: CLINIC | Age: 34
End: 2019-05-30
Payer: COMMERCIAL

## 2019-05-30 VITALS
HEART RATE: 68 BPM | BODY MASS INDEX: 30.72 KG/M2 | HEIGHT: 69 IN | WEIGHT: 207.4 LBS | SYSTOLIC BLOOD PRESSURE: 110 MMHG | DIASTOLIC BLOOD PRESSURE: 70 MMHG

## 2019-05-30 DIAGNOSIS — R00.2 PALPITATIONS: ICD-10-CM

## 2019-05-30 DIAGNOSIS — I47.10 SVT (SUPRAVENTRICULAR TACHYCARDIA) (H): ICD-10-CM

## 2019-05-30 DIAGNOSIS — J45.40 MODERATE PERSISTENT ASTHMA WITHOUT COMPLICATION: ICD-10-CM

## 2019-05-30 DIAGNOSIS — R07.89 CHEST TIGHTNESS OR PRESSURE: ICD-10-CM

## 2019-05-30 DIAGNOSIS — Z13.6 SCREENING FOR CARDIOVASCULAR CONDITION: Primary | ICD-10-CM

## 2019-05-30 LAB — MAGNESIUM SERPL-MCNC: 2.2 MG/DL (ref 1.6–2.3)

## 2019-05-30 PROCEDURE — 93000 ELECTROCARDIOGRAM COMPLETE: CPT | Performed by: INTERNAL MEDICINE

## 2019-05-30 PROCEDURE — 36415 COLL VENOUS BLD VENIPUNCTURE: CPT | Performed by: INTERNAL MEDICINE

## 2019-05-30 PROCEDURE — 83735 ASSAY OF MAGNESIUM: CPT | Performed by: INTERNAL MEDICINE

## 2019-05-30 PROCEDURE — 99214 OFFICE O/P EST MOD 30 MIN: CPT | Performed by: INTERNAL MEDICINE

## 2019-05-30 ASSESSMENT — MIFFLIN-ST. JEOR: SCORE: 1687.2

## 2019-05-30 NOTE — PROGRESS NOTES
HPI and Plan:   See dictation    Orders Placed This Encounter   Procedures     Magnesium     Follow-Up with Cardiac Advanced Practice Provider     EKG 12-lead complete w/read - Clinics (performed today)     Echocardiogram Complete     Exercise Stress Echocardiogram       Orders Placed This Encounter   Medications     NEW MED     Sig: SHATAVARI  HERBAL SUPPLEMENT FOR HORMONE BALANCE       There are no discontinued medications.      Encounter Diagnoses   Name Primary?     Screening for cardiovascular condition Yes     SVT (supraventricular tachycardia) (H)      Palpitations      Moderate persistent asthma without complication      Chest tightness or pressure        CURRENT MEDICATIONS:  Current Outpatient Medications   Medication Sig Dispense Refill     Acetaminophen (TYLENOL PO) Take 1,000 mg by mouth every 6 hours as needed Reported on 3/14/2017       albuterol (PROAIR HFA/PROVENTIL HFA/VENTOLIN HFA) 108 (90 BASE) MCG/ACT Inhaler Inhale 2 puffs into the lungs daily as needed for shortness of breath / dyspnea or wheezing       cetirizine (ZYRTEC) 10 MG tablet Take 10 mg by mouth At Bedtime       fluticasone-salmeterol (ADVAIR-HFA) 115-21 MCG/ACT inhaler Inhale 2 puffs into the lungs 2 times daily as needed        gabapentin (NEURONTIN) 300 MG capsule TAKE 1 CAPSULE BY MOUTH EVERYDAY AT BEDTIME  3     IBUPROFEN PO Take 600-800 mg by mouth 3 times daily as needed for moderate pain       magnesium 250 MG tablet Take 1 tablet by mouth daily       montelukast (SINGULAIR) 10 MG tablet Take 1 tablet (10 mg) by mouth At Bedtime Due for appt before further refills. 30 tablet 0     multivitamin, therapeutic with minerals (MULTI-VITAMIN) TABS tablet Take 1 tablet by mouth daily       NEW MED SHATAVARI  HERBAL SUPPLEMENT FOR HORMONE BALANCE       rizatriptan (MAXALT-MLT) 5 MG ODT Place 5 mg under the tongue       VITAMIN D, CHOLECALCIFEROL, PO Take 1 tablet by mouth daily         ALLERGIES     Allergies   Allergen Reactions      "Liquid Adhesive Rash     Morphine Sulfate Anxiety     Tape [Adhesive Tape] Rash     Vicodin [Hydrocodone-Acetaminophen] Other (See Comments)     insomnia     Latex      SENSITIVITY TO LATEX GLOVES - \"HANDS FEEL SWOLLEN AFTER USING\"       PAST MEDICAL HISTORY:  Past Medical History:   Diagnosis Date     Anxiety and depression      Asthma     allergy induced     Endometriosis      Fam hx-cardiovas dis NEC     Father     Family history of diabetes mellitus     MGM     Fibroids      Headaches     randomly occurring, much better recently     HPV in female      Hx of previous reproductive problem      Migraine      Motion sickness      Other acne      Palpitations      PONV (postoperative nausea and vomiting)     high anxiety when awakening from anesthesia.....hx in the distant past of difficult intubation - this occurred in ...     Seasonal allergies      Spider veins      STD (female)        PAST SURGICAL HISTORY:  Past Surgical History:   Procedure Laterality Date     C LASIK (EMP) W OPT MYOPIA -3.12  2009    bilaterally     CERCLAGE CERVICAL N/A 12/10/2014    Procedure: CERCLAGE CERVICAL;  Surgeon: Marc Stapleton MD;  [facility]     CERCLAGE CERVICAL N/A 2015    Procedure: CERCLAGE CERVICAL;  Surgeon: Mj Blum MD;  [facility]      SECTION N/A 3/4/2015    Procedure:  SECTION;  Surgeon: Elle Howell MD;  [facility]     CYSTECTOMY OVARIAN BENIGN  2013    Procedure: CYSTECTOMY OVARIAN BENIGN;;  Surgeon: Marc Stapleton MD;  Location:  OR     DILATION AND CURETTAGE       DILATION AND CURETTAGE SUCTION  1/10/2013    Procedure: DILATION AND CURETTAGE SUCTION;  Suction D & C with Intraoperative Ultrasound;  Surgeon: Nubia Gregorio MD;  Location:  OR     DILATION AND CURETTAGE, OPERATIVE HYSTEROSCOPY WITH MORCELLATOR, COMBINED N/A 2016    Procedure: COMBINED DILATION AND CURETTAGE, OPERATIVE HYSTEROSCOPY WITH MORCELLATOR;  Surgeon: Jg" Nubia Duarte MD;  Location: [facility]     EXCISE LESION PERINEAL  6/12/2014    Procedure: EXCISE LESION PERINEAL;  Surgeon: Marc Stapleton MD;  Location: [facility]     GYN SURGERY      Laparotomy     HC CREATE EARDRUM OPENING,GEN ANESTH  infant    P.E. Tubes     HC HYSTEROSCOPY, SURGICAL; W/ REMOVAL LEIOMYOMATA  6/2007    4 cm fibroid     HC REMOVAL OF TONSILS,12+ Y/O  3/2008    Tonsils 10+y.o.     HC TOOTH EXTRACTION W/FORCEP  3/2007    wisdom teeth     HYSTEROSCOPY,REMOVE MYOMA  7/2008     LAPAROSCOPIC CYSTECTOMY OVARIAN (BENIGN) Left 4/25/2017    Procedure: LAPAROSCOPIC CYSTECTOMY OVARIAN (BENIGN);;  Surgeon: Nubia Gregorio MD;  Location:  OR     LAPAROSCOPIC LYSIS ADHESIONS N/A 8/9/2016    Procedure: LAPAROSCOPIC LYSIS ADHESIONS;  Surgeon: Nubia Gregorio MD;  Location: [facility]     LAPAROSCOPIC TUBAL DYE STUDY  6/12/2014    Procedure: LAPAROSCOPIC TUBAL DYE STUDY;  Surgeon: Marc Stapleton MD;  Location: [facility]     LAPAROSCOPIC TUBAL DYE STUDY  8/9/2016    Procedure: LAPAROSCOPIC TUBAL DYE STUDY;  Surgeon: Nubia Gregorio MD;  Location: [facility]     LAPAROSCOPY DIAGNOSTIC (GYN) N/A 4/25/2017    Procedure: LAPAROSCOPY DIAGNOSTIC (GYN);;  Surgeon: Nubia Gregorio MD;  Location: [facility] BILATERAL Bilateral 2008     MYOMECTOMY UTERUS  4/4/2013    Procedure: MYOMECTOMY UTERUS;  PELVIC LAPAROTOMY, Multiple MYOMECTOMY, RIGHT OVARIAN CYSTECTOMY, placement of seprafilm;  Surgeon: Marc Stapleton MD;  Location:  OR     OPERATIVE HYSTEROSCOPY WITH MORCELLATOR  6/12/2014    Procedure: OPERATIVE HYSTEROSCOPY WITH MORCELLATOR (ALVARADO & NEPHEW);  Surgeon: Marc Stapleton MD;  Location: [facility]     OPERATIVE HYSTEROSCOPY WITH MORCELLATOR N/A 9/23/2016    Procedure: OPERATIVE HYSTEROSCOPY WITH MORCELLATOR (ALVARADO & NEPHEW);  Surgeon: Nubia Gregorio MD;  Location: [facility]     OPERATIVE HYSTEROSCOPY WITH MORCELLATOR N/A 4/25/2017    Procedure: OPERATIVE HYSTEROSCOPY WITH MORCELLATOR (ALVARADO & NEPHEW);   HYSTEROSCOPY DILATION AND CURATTAGE WITH ULTRASOUND GUIDANCE,  LAPAROSCOPIC LYSIS OF ADHESIONS, LAPAROSCOPIC BILATERAL OVARIAN CYSTECTOMY;  Surgeon: Nubia Gregorio MD;  Location:  OR     pending  2011    removal of uterine fibroids     REMOVE CERCLAGE N/A 3/4/2015    Procedure: REMOVE CERCLAGE;  Surgeon: Elle Howell MD;  [facility]     wisdom teeth       wisdom teeth extraction         FAMILY HISTORY:  Family History   Problem Relation Age of Onset     Depression Mother      Hypertension Father      Depression Father      Cancer Father         skin     Diabetes Maternal Grandmother      Depression Maternal Grandfather      Depression Paternal Grandmother      C.A.D. No family hx of      Breast Cancer No family hx of      Cancer - colorectal No family hx of        SOCIAL HISTORY:  Social History     Socioeconomic History     Marital status:      Spouse name: None     Number of children: 0     Years of education: 18     Highest education level: None   Occupational History     Employer: [employer]     Employer: NONE    Social Needs     Financial resource strain: None     Food insecurity:     Worry: None     Inability: None     Transportation needs:     Medical: None     Non-medical: None   Tobacco Use     Smoking status: Former Smoker     Packs/day: 0.25     Years: 13.00     Pack years: 3.25     Last attempt to quit: 2012     Years since quittin.7     Smokeless tobacco: Never Used     Tobacco comment: socially - 1 x per week maximum    Substance and Sexual Activity     Alcohol use: Yes     Alcohol/week: 0.0 oz     Comment: social     Drug use: No     Sexual activity: Yes     Partners: Male     Birth control/protection: Condom, Pill   Lifestyle     Physical activity:     Days per week: None     Minutes per session: None     Stress: None   Relationships     Social connections:     Talks on phone: None     Gets together: None     Attends Protestant service: None      "Active member of club or organization: None     Attends meetings of clubs or organizations: None     Relationship status: None     Intimate partner violence:     Fear of current or ex partner: None     Emotionally abused: None     Physically abused: None     Forced sexual activity: None   Other Topics Concern      Service No     Blood Transfusions No     Comment: was denied ability to give blood/ 2002     Caffeine Concern Not Asked     Occupational Exposure Not Asked     Hobby Hazards No     Sleep Concern No     Stress Concern No     Weight Concern Yes     Comment: gained on depo     Special Diet No     Back Care Not Asked     Exercise Yes     Bike Helmet Not Asked     Seat Belt Yes     Self-Exams Yes     Parent/sibling w/ CABG, MI or angioplasty before 65F 55M? Not Asked   Social History Narrative     None       Review of Systems:  Skin:  Negative       Eyes:  Positive for glasses    ENT:  Negative      Respiratory:  Negative       Cardiovascular:    palpitations;Positive for;chest pain    Gastroenterology: Positive for reflux;heartburn    Genitourinary:         Musculoskeletal:         Neurologic:         Psychiatric:         Heme/Lymph/Imm:         Endocrine:           Physical Exam:  Vitals: /70 (BP Location: Right arm, Patient Position: Sitting, Cuff Size: Adult Regular)   Pulse 68   Ht 1.74 m (5' 8.5\")   Wt 94.1 kg (207 lb 6.4 oz)   Breastfeeding? No   BMI 31.08 kg/m      Constitutional:  cooperative, alert and oriented, well developed, well nourished, in no acute distress overweight      Skin:  warm and dry to the touch          Head:  normocephalic        Eyes:  pupils equal and round        Lymph:      ENT:  no pallor or cyanosis, dentition good        Neck:  carotid pulses are full and equal bilaterally, JVP normal, no carotid bruit        Respiratory:  normal breath sounds, clear to auscultation, normal A-P diameter, normal symmetry, normal respiratory excursion, no use of accessory " muscles         Cardiac: regular rhythm, normal S1/S2, no S3 or S4, apical impulse not displaced, no murmurs, gallops or rubs                pulses full and equal                                        GI:  not assessed this visit        Extremities and Muscular Skeletal:  no deformities, clubbing, cyanosis, erythema observed;no edema              Neurological:  no gross motor deficits;affect appropriate        Psych:  Alert and Oriented x 3;affect appropriate, oriented to time, person and place Anxious      CC  No referring provider defined for this encounter.

## 2019-05-30 NOTE — PROGRESS NOTES
Service Date: 2019      INDICATION FOR CARDIAC CONSULTATION:  Palpitations and SVT and chest discomfort.        HISTORY OF PRESENT ILLNESS:  It is my pleasure to see your patient, Leisa Leong, who is a very pleasant 34-year-old patient who has noticed since possibly summer or fall of last year that she has been developing palpitations.  She describes different symptoms.  Firstly, she describes symptoms of extra heartbeat followed by a pause and then a strong heartbeat and maybe 2 or 3 extra beats  after that.  She also describes episodes of where her heart suddenly beat rapidly for a few seconds.  She describes upper right chest discomfort which in many cases is not associated with the palpitations.  This discomfort in the right upper chest can last for half a day.  She occasionally gets a more sharp discomfort, which is in the center of her chest and can be associated with the palpitations.  She has no exertional chest discomfort.  She has had no nocturnal chest discomfort.      Her blood pressure is normal at 110/70.  Her electrolytes were normal when they were checked at the [facility].  Sodium is 141, potassium 4.4, BUN is 11, creatinine is 0.73.  No magnesium was checked, but thyroid function was normal with a TSH of 1.77.        A 12-lead electrocardiogram today showed the patient is in sinus rhythm.  There is no evidence of preexcitation.  There is nonspecific ST sagging in II, III, aVF and also from V3-V6.  The patient did have a ZIO Patch monitor placed and this revealed occasional PACs.  She also had 2 episodes of SVT and 1 of those episodes was associated with symptoms.  These episodes were relatively short-lived, with the longest being 7 beats      Her father  unfortunately in January of sudden death, but he had an entirely separate condition which was chronic thromboembolic pulmonary hypertension and was followed by Dr. Vasques for this.  He also had episodes of atrial fibrillation,  congestive heart failure, most likely right-sided heart failure, since according to his daughter, his coronary arteries had no flow obstruction. Her father had a PFO closure.  Her daughter also had a persistent PFO that was closed with ibuprofen as a baby.      IMPRESSION:   1.  Palpitations.  This appears to be due to PACs and short runs of SVT.  There seems to be no obvious cause for this given that her TSH is normal and also given that her potassium was normal also.  She does have a history of asthma, but she has not noticed any association with her beta agonist inhalers.  She also has not noticed any association with flares in her asthma which has been quiescent for the last 5 months. She does drink caffeine, however, but has stopped drinking caffeine when she had her the diagnosis on the event monitor.   2.  Asthma.   3.  Family history of thromboembolic disease with associated pulmonary hypertension.      PLAN:     1.  We will obtain an echocardiogram to determine if there is any evidence of structural heart disease that could be triggering these arrhythmias.   2.  Next, we will perform a stress EKG which will be useful from 2 reasons:  1, the stress EKG portion will be important to see if we can trigger arrhythmias with exercise and secondly we can determine if there is any evidence of ischemia causing her problems.  I will have the patient return to see us with the results of these tests.        If her palpitations become problematic, then we could try low-dose metoprolol.  This could be problematic, however, for 2 reasons:  k1 she has a history of asthma and obviously metoprolol, though cardio-specific, can trigger asthma and secondly, her blood pressure tends to run on the lower side.  However, once we will get our information, we can discuss further therapeutic treatment, if any.  Definitely, the patient should stop taking caffeinated products, such as caffeine itself or caffeinated soft drinks.      Many  thanks for allowing me to be involved in the care of this very nice patient.      cc:   Jeny Goetz MD    [facility]         VIVIEN ALVARADO MD, [facility]             D: 2019   T: 2019   MT: POLO      Name:     ZEENAT ALVAREZ   MRN:      [id number]        Account:      [id number]   :      1985           Service Date: 2019      Document: [id number]

## 2019-05-30 NOTE — LETTER
5/30/2019      Nubia Gregorio MD  [facility] [address]  [facility] [address]      RE: Leisa Leong       Dear Colleague,    I had the pleasure of seeing Leisa Leong in the [facility].    Service Date: 05/30/2019      INDICATION FOR CARDIAC CONSULTATION:  Palpitations and SVT and chest discomfort.        HISTORY OF PRESENT ILLNESS:  It is my pleasure to see your patient, Leisa Leong, who is a very pleasant 34-year-old patient who has noticed since possibly summer or fall of last year that she has been developing palpitations.  She describes different symptoms.  Firstly, she describes symptoms of extra heartbeat followed by a pause and then a strong heartbeat and maybe 2 or 3 extra beats  after that.  She also describes episodes of where her heart suddenly beat rapidly for a few seconds.  She describes upper right chest discomfort which in many cases is not associated with the palpitations.  This discomfort in the right upper chest can last for half a day.  She occasionally gets a more sharp discomfort, which is in the center of her chest and can be associated with the palpitations.  She has no exertional chest discomfort.  She has had no nocturnal chest discomfort.      Her blood pressure is normal at 110/70.  Her electrolytes were normal when they were checked at the [facility].  Sodium is 141, potassium 4.4, BUN is 11, creatinine is 0.73.  No magnesium was checked, but thyroid function was normal with a TSH of 1.77.        A 12-lead electrocardiogram today showed the patient is in sinus rhythm.  There is no evidence of preexcitation.  There is nonspecific ST sagging in II, III, aVF and also from V3-V6.  The patient did have a ZIO Patch monitor placed and this revealed occasional PACs.  She also had 2 episodes of SVT and 1 of those episodes was associated with symptoms.  These episodes were relatively short-lived, with the longest being 7 beats       Her father  unfortunately in January of sudden death, but he had an entirely separate condition which was chronic thromboembolic pulmonary hypertension and was followed by Dr. Vasques for this.  He also had episodes of atrial fibrillation, congestive heart failure, most likely right-sided heart failure, since according to his daughter, his coronary arteries had no flow obstruction. Her father had a PFO closure.  Her daughter also had a persistent PFO that was closed with ibuprofen as a baby.      IMPRESSION:   1.  Palpitations.  This appears to be due to PACs and short runs of SVT.  There seems to be no obvious cause for this given that her TSH is normal and also given that her potassium was normal also.  She does have a history of asthma, but she has not noticed any association with her beta agonist inhalers.  She also has not noticed any association with flares in her asthma which has been quiescent for the last 5 months. She does drink caffeine, however, but has stopped drinking caffeine when she had her the diagnosis on the event monitor.   2.  Asthma.   3.  Family history of thromboembolic disease with associated pulmonary hypertension.      PLAN:     1.  We will obtain an echocardiogram to determine if there is any evidence of structural heart disease that could be triggering these arrhythmias.   2.  Next, we will perform a stress EKG which will be useful from 2 reasons:  1, the stress EKG portion will be important to see if we can trigger arrhythmias with exercise and secondly we can determine if there is any evidence of ischemia causing her problems.  I will have the patient return to see us with the results of these tests.        If her palpitations become problematic, then we could try low-dose metoprolol.  This could be problematic, however, for 2 reasons:  k1 she has a history of asthma and obviously metoprolol, though cardio-specific, can trigger asthma and secondly, her blood pressure tends to  run on the lower side.  However, once we will get our information, we can discuss further therapeutic treatment, if any.  Definitely, the patient should stop taking caffeinated products, such as caffeine itself or caffeinated soft drinks.      Many thanks for allowing me to be involved in the care of this very nice patient.      cc:   Jeny Goetz MD    [facility]         VIVIEN ALVARADO MD, [facility]             D: 2019   T: 2019   MT:       Name:     ZEENAT ALVAREZ   MRN:      [id number]        Account:      [id number]   :      1985           Service Date: 2019      Document: [id number]           Outpatient Encounter Medications as of 2019   Medication Sig Dispense Refill     Acetaminophen (TYLENOL PO) Take 1,000 mg by mouth every 6 hours as needed Reported on 3/14/2017       albuterol (PROAIR HFA/PROVENTIL HFA/VENTOLIN HFA) 108 (90 BASE) MCG/ACT Inhaler Inhale 2 puffs into the lungs daily as needed for shortness of breath / dyspnea or wheezing       cetirizine (ZYRTEC) 10 MG tablet Take 10 mg by mouth At Bedtime       fluticasone-salmeterol (ADVAIR-HFA) 115-21 MCG/ACT inhaler Inhale 2 puffs into the lungs 2 times daily as needed        gabapentin (NEURONTIN) 300 MG capsule TAKE 1 CAPSULE BY MOUTH EVERYDAY AT BEDTIME  3     IBUPROFEN PO Take 600-800 mg by mouth 3 times daily as needed for moderate pain       magnesium 250 MG tablet Take 1 tablet by mouth daily       montelukast (SINGULAIR) 10 MG tablet Take 1 tablet (10 mg) by mouth At Bedtime Due for appt before further refills. 30 tablet 0     multivitamin, therapeutic with minerals (MULTI-VITAMIN) TABS tablet Take 1 tablet by mouth daily       NEW MED SHATAVARI  HERBAL SUPPLEMENT FOR HORMONE BALANCE       rizatriptan (MAXALT-MLT) 5 MG ODT Place 5 mg under the tongue       VITAMIN D, CHOLECALCIFEROL, PO Take 1 tablet by mouth daily       No facility-administered encounter medications on file as of 2019.         Again, thank you for allowing me to participate in the care of your patient.      Sincerely,    Maximo Chance MD, MD     [facility]

## 2019-05-30 NOTE — LETTER
5/30/2019    Nubia Gregorio MD  [facility] [address]  [place] [address]    RE: Leisa Leong       Dear Colleague,    I had the pleasure of seeing Leisa Leong in the [facility].    HPI and Plan:   See dictation    Orders Placed This Encounter   Procedures     Magnesium     Follow-Up with Cardiac Advanced Practice Provider     EKG 12-lead complete w/read - Clinics (performed today)     Echocardiogram Complete     Exercise Stress Echocardiogram       Orders Placed This Encounter   Medications     NEW MED     Sig: SHATAVARI  HERBAL SUPPLEMENT FOR HORMONE BALANCE       There are no discontinued medications.      Encounter Diagnoses   Name Primary?     Screening for cardiovascular condition Yes     SVT (supraventricular tachycardia) (H)      Palpitations      Moderate persistent asthma without complication      Chest tightness or pressure        CURRENT MEDICATIONS:  Current Outpatient Medications   Medication Sig Dispense Refill     Acetaminophen (TYLENOL PO) Take 1,000 mg by mouth every 6 hours as needed Reported on 3/14/2017       albuterol (PROAIR HFA/PROVENTIL HFA/VENTOLIN HFA) 108 (90 BASE) MCG/ACT Inhaler Inhale 2 puffs into the lungs daily as needed for shortness of breath / dyspnea or wheezing       cetirizine (ZYRTEC) 10 MG tablet Take 10 mg by mouth At Bedtime       fluticasone-salmeterol (ADVAIR-HFA) 115-21 MCG/ACT inhaler Inhale 2 puffs into the lungs 2 times daily as needed        gabapentin (NEURONTIN) 300 MG capsule TAKE 1 CAPSULE BY MOUTH EVERYDAY AT BEDTIME  3     IBUPROFEN PO Take 600-800 mg by mouth 3 times daily as needed for moderate pain       magnesium 250 MG tablet Take 1 tablet by mouth daily       montelukast (SINGULAIR) 10 MG tablet Take 1 tablet (10 mg) by mouth At Bedtime Due for appt before further refills. 30 tablet 0     multivitamin, therapeutic with minerals (MULTI-VITAMIN) TABS tablet Take 1 tablet by mouth daily        "NEW MED [facility]  HERBAL SUPPLEMENT FOR HORMONE BALANCE       rizatriptan (MAXALT-MLT) 5 MG ODT Place 5 mg under the tongue       VITAMIN D, CHOLECALCIFEROL, PO Take 1 tablet by mouth daily         ALLERGIES     Allergies   Allergen Reactions     Liquid Adhesive Rash     Morphine Sulfate Anxiety     Tape [Adhesive Tape] Rash     Vicodin [Hydrocodone-Acetaminophen] Other (See Comments)     insomnia     Latex      SENSITIVITY TO LATEX GLOVES - \"HANDS FEEL SWOLLEN AFTER USING\"       PAST MEDICAL HISTORY:  Past Medical History:   Diagnosis Date     Anxiety and depression      Asthma     allergy induced     Endometriosis      Fam hx-cardiovas dis NEC     Father     Family history of diabetes mellitus     MGM     Fibroids      Headaches     randomly occurring, much better recently     HPV in female      Hx of previous reproductive problem      Migraine      Motion sickness      Other acne      Palpitations      PONV (postoperative nausea and vomiting)     high anxiety when awakening from anesthesia.....hx in the distant past of difficult intubation - this occurred in ...     Seasonal allergies      Spider veins      STD (female)        PAST SURGICAL HISTORY:  Past Surgical History:   Procedure Laterality Date     C LASIK (EMP) W OPT MYOPIA -3.12  2009    bilaterally     CERCLAGE CERVICAL N/A 12/10/2014    Procedure: CERCLAGE CERVICAL;  Surgeon: Marc Stapleton MD;  Location:  [facility]     CERCLAGE CERVICAL N/A 2015    Procedure: CERCLAGE CERVICAL;  Surgeon: Mj Blum MD;  Location: [facility] L[facility]      SECTION N/A 3/4/2015    Procedure:  SECTION;  Surgeon: Elle Howell MD;  Location: [facility]     CYSTECTOMY OVARIAN BENIGN  2013    Procedure: CYSTECTOMY OVARIAN BENIGN;;  Surgeon: Marc Stapleton MD;  Location:  [facility]     DILATION AND CURETTAGE       DILATION AND CURETTAGE SUCTION  1/10/2013    Procedure: DILATION AND CURETTAGE SUCTION;  Suction D & C with Intraoperative Ultrasound;  " Surgeon: Nubia Gregorio MD;  Location:  OR     DILATION AND CURETTAGE, OPERATIVE HYSTEROSCOPY WITH MORCELLATOR, COMBINED N/A 8/9/2016    Procedure: COMBINED DILATION AND CURETTAGE, OPERATIVE HYSTEROSCOPY WITH MORCELLATOR;  Surgeon: Nubia Gregorio MD;  Location: [facility]     EXCISE LESION PERINEAL  6/12/2014    Procedure: EXCISE LESION PERINEAL;  Surgeon: Marc Stapleton MD;  Location: [facility]     GYN SURGERY      Laparotomy     HC CREATE EARDRUM OPENING,GEN ANESTH  infant    P.E. Tubes     HC HYSTEROSCOPY, SURGICAL; W/ REMOVAL LEIOMYOMATA  6/2007    4 cm fibroid     HC REMOVAL OF TONSILS,12+ Y/O  3/2008    Tonsils 10+y.o.     HC TOOTH EXTRACTION W/FORCEP  3/2007    wisdom teeth     HYSTEROSCOPY,REMOVE MYOMA  7/2008     LAPAROSCOPIC CYSTECTOMY OVARIAN (BENIGN) Left 4/25/2017    Procedure: LAPAROSCOPIC CYSTECTOMY OVARIAN (BENIGN);;  Surgeon: Nubia Gregorio MD;  Location:  OR     LAPAROSCOPIC LYSIS ADHESIONS N/A 8/9/2016    Procedure: LAPAROSCOPIC LYSIS ADHESIONS;  Surgeon: Nubia Gregorio MD;  Location: [facility]     LAPAROSCOPIC TUBAL DYE STUDY  6/12/2014    Procedure: LAPAROSCOPIC TUBAL DYE STUDY;  Surgeon: Marc Stapleton MD;  Location: [facility]     LAPAROSCOPIC TUBAL DYE STUDY  8/9/2016    Procedure: LAPAROSCOPIC TUBAL DYE STUDY;  Surgeon: Nubia Gregorio MD;  Location: [facility]     LAPAROSCOPY DIAGNOSTIC (GYN) N/A 4/25/2017    Procedure: LAPAROSCOPY DIAGNOSTIC (GYN);;  Surgeon: Nubia Gregorio MD;  Location: [facility]     LAS BILATERAL Bilateral 2008     MYOMECTOMY UTERUS  4/4/2013    Procedure: MYOMECTOMY UTERUS;  PELVIC LAPAROTOMY, Multiple MYOMECTOMY, RIGHT OVARIAN CYSTECTOMY, placement of seprafilm;  Surgeon: Marc Stapleton MD;  Location:  OR     OPERATIVE HYSTEROSCOPY WITH MORCELLATOR  6/12/2014    Procedure: OPERATIVE HYSTEROSCOPY WITH MORCELLATOR (ALVARADO & NEPHEW);  Surgeon: Marc Stapleton MD;  Location: [facility]     OPERATIVE HYSTEROSCOPY WITH MORCELLATOR N/A 9/23/2016     Procedure: OPERATIVE HYSTEROSCOPY WITH MORCELLATOR (ALVARADO & NEPHEW);  Surgeon: Nubia Gregorio MD;  Location: [facility]     OPERATIVE HYSTEROSCOPY WITH MORCELLATOR N/A 2017    Procedure: OPERATIVE HYSTEROSCOPY WITH MORCELLATOR (ALVARADO & NEPHEW);  HYSTEROSCOPY DILATION AND CURATTAGE WITH ULTRASOUND GUIDANCE,  LAPAROSCOPIC LYSIS OF ADHESIONS, LAPAROSCOPIC BILATERAL OVARIAN CYSTECTOMY;  Surgeon: Nubia Gregorio MD;  Location:  OR     pending  2011    removal of uterine fibroids     REMOVE CERCLAGE N/A 3/4/2015    Procedure: REMOVE CERCLAGE;  Surgeon: Elle Hoewll MD;  Location:  L+D     wisdom teeth       wisdom teeth extraction         FAMILY HISTORY:  Family History   Problem Relation Age of Onset     Depression Mother      Hypertension Father      Depression Father      Cancer Father         skin     Diabetes Maternal Grandmother      Depression Maternal Grandfather      Depression Paternal Grandmother      C.A.D. No family hx of      Breast Cancer No family hx of      Cancer - colorectal No family hx of        SOCIAL HISTORY:  Social History     Socioeconomic History     Marital status:      Spouse name: None     Number of children: 0     Years of education: 18     Highest education level: None   Occupational History     Employer: [employer]     Employer: NONE    Social Needs     Financial resource strain: None     Food insecurity:     Worry: None     Inability: None     Transportation needs:     Medical: None     Non-medical: None   Tobacco Use     Smoking status: Former Smoker     Packs/day: 0.25     Years: 13.00     Pack years: 3.25     Last attempt to quit: 2012     Years since quittin.7     Smokeless tobacco: Never Used     Tobacco comment: socially - 1 x per week maximum    Substance and Sexual Activity     Alcohol use: Yes     Alcohol/week: 0.0 oz     Comment: social     Drug use: No     Sexual activity: Yes     Partners: Male     Birth control/protection:  "Condom, Pill   Lifestyle     Physical activity:     Days per week: None     Minutes per session: None     Stress: None   Relationships     Social connections:     Talks on phone: None     Gets together: None     Attends Latter day service: None     Active member of club or organization: None     Attends meetings of clubs or organizations: None     Relationship status: None     Intimate partner violence:     Fear of current or ex partner: None     Emotionally abused: None     Physically abused: None     Forced sexual activity: None   Other Topics Concern      Service No     Blood Transfusions No     Comment: was denied ability to give blood/ 2002     Caffeine Concern Not Asked     Occupational Exposure Not Asked     Hobby Hazards No     Sleep Concern No     Stress Concern No     Weight Concern Yes     Comment: gained on depo     Special Diet No     Back Care Not Asked     Exercise Yes     Bike Helmet Not Asked     Seat Belt Yes     Self-Exams Yes     Parent/sibling w/ CABG, MI or angioplasty before 65F 55M? Not Asked   Social History Narrative     None       Review of Systems:  Skin:  Negative       Eyes:  Positive for glasses    ENT:  Negative      Respiratory:  Negative       Cardiovascular:    palpitations;Positive for;chest pain    Gastroenterology: Positive for reflux;heartburn    Genitourinary:         Musculoskeletal:         Neurologic:         Psychiatric:         Heme/Lymph/Imm:         Endocrine:           Physical Exam:  Vitals: /70 (BP Location: Right arm, Patient Position: Sitting, Cuff Size: Adult Regular)   Pulse 68   Ht 1.74 m (5' 8.5\")   Wt 94.1 kg (207 lb 6.4 oz)   Breastfeeding? No   BMI 31.08 kg/m       Constitutional:  cooperative, alert and oriented, well developed, well nourished, in no acute distress overweight      Skin:  warm and dry to the touch          Head:  normocephalic        Eyes:  pupils equal and round        Lymph:      ENT:  no pallor or cyanosis, dentition " good        Neck:  carotid pulses are full and equal bilaterally, JVP normal, no carotid bruit        Respiratory:  normal breath sounds, clear to auscultation, normal A-P diameter, normal symmetry, normal respiratory excursion, no use of accessory muscles         Cardiac: regular rhythm, normal S1/S2, no S3 or S4, apical impulse not displaced, no murmurs, gallops or rubs                pulses full and equal                                        GI:  not assessed this visit        Extremities and Muscular Skeletal:  no deformities, clubbing, cyanosis, erythema observed;no edema              Neurological:  no gross motor deficits;affect appropriate        Psych:  Alert and Oriented x 3;affect appropriate, oriented to time, person and place Anxious      CC  No referring provider defined for this encounter.                Thank you for allowing me to participate in the care of your patient.      Sincerely,     Maximo Chance MD, MD     [facility] [place]    cc:   No referring provider defined for this encounter.

## 2019-06-07 ENCOUNTER — HOSPITAL ENCOUNTER (OUTPATIENT)
Dept: CARDIOLOGY | Facility: CLINIC | Age: 34
Discharge: HOME | End: 2019-06-07
Attending: OBSTETRICS & GYNECOLOGY | Admitting: OBSTETRICS & GYNECOLOGY
Payer: COMMERCIAL

## 2019-06-07 DIAGNOSIS — R00.2 PALPITATIONS: ICD-10-CM

## 2019-06-07 DIAGNOSIS — I47.10 SVT (SUPRAVENTRICULAR TACHYCARDIA) (H): ICD-10-CM

## 2019-06-07 PROCEDURE — 93306 TTE W/DOPPLER COMPLETE: CPT | Mod: 26 | Performed by: INTERNAL MEDICINE

## 2019-06-07 PROCEDURE — 25500064 ZZH RX 255 OP 636: Performed by: OBSTETRICS & GYNECOLOGY

## 2019-06-07 PROCEDURE — 40000264 ECHOCARDIOGRAM COMPLETE

## 2019-06-07 RX ADMIN — HUMAN ALBUMIN MICROSPHERES AND PERFLUTREN 3 ML: 10; .22 INJECTION, SOLUTION INTRAVENOUS at 15:15

## 2019-06-11 ENCOUNTER — HOSPITAL ENCOUNTER (OUTPATIENT)
Dept: CARDIOLOGY | Facility: CLINIC | Age: 34
Discharge: HOME | End: 2019-06-11
Attending: INTERNAL MEDICINE | Admitting: INTERNAL MEDICINE
Payer: COMMERCIAL

## 2019-06-11 DIAGNOSIS — R07.89 CHEST TIGHTNESS OR PRESSURE: ICD-10-CM

## 2019-06-11 PROCEDURE — 93018 CV STRESS TEST I&R ONLY: CPT | Performed by: INTERNAL MEDICINE

## 2019-06-11 PROCEDURE — 25500064 ZZH RX 255 OP 636: Performed by: INTERNAL MEDICINE

## 2019-06-11 PROCEDURE — 93350 STRESS TTE ONLY: CPT | Mod: 26 | Performed by: INTERNAL MEDICINE

## 2019-06-11 PROCEDURE — 93325 DOPPLER ECHO COLOR FLOW MAPG: CPT | Mod: 26 | Performed by: INTERNAL MEDICINE

## 2019-06-11 PROCEDURE — 93321 DOPPLER ECHO F-UP/LMTD STD: CPT | Mod: 26 | Performed by: INTERNAL MEDICINE

## 2019-06-11 PROCEDURE — 93016 CV STRESS TEST SUPVJ ONLY: CPT | Performed by: INTERNAL MEDICINE

## 2019-06-11 PROCEDURE — 40000264 ECHO STRESS ECHOCARDIOGRAM

## 2019-06-11 RX ADMIN — HUMAN ALBUMIN MICROSPHERES AND PERFLUTREN 4 ML: 10; .22 INJECTION, SOLUTION INTRAVENOUS at 15:09

## 2019-06-12 ENCOUNTER — DOCUMENTATION ONLY (OUTPATIENT)
Dept: CARDIOLOGY | Facility: CLINIC | Age: 34
End: 2019-06-12

## 2019-06-12 ENCOUNTER — CARE COORDINATION (OUTPATIENT)
Dept: CARDIOLOGY | Facility: CLINIC | Age: 34
End: 2019-06-12

## 2019-06-12 DIAGNOSIS — R94.39 ABNORMAL CARDIOVASCULAR STRESS TEST: ICD-10-CM

## 2019-06-12 DIAGNOSIS — R07.89 CHEST TIGHTNESS OR PRESSURE: Primary | ICD-10-CM

## 2019-06-12 NOTE — PROGRESS NOTES
Reviewed echo showing   1. The aortic valve is probably tricuspid. The cusps were not well visualized and the degree of (nodular) sclerosis appears more than would be expected for a patient of 34 years.  2. Normal left ventricular size and systolic function. LVEF 60-65%.  3. Normal right ventricular size and systolic function.  4. Normal dimension of aortic root and proximal ascending aorta.    Reviewed stress echo showing   The patient exhibited no chest pain during exercise.  The Duke treadmill score was intermediate risk ( -11< Tomlin score <5).  The stress EKG is non-diagnostic due to pre-existing EKG abnomalities.  A CT angiogram , stress nuclear scan or stress MRI could be performed if there is any clinical suggestion of ischemia.   This was a normal stress echocardiogram with no evidence of stress-induced ischemia.    Pt has office visit 6/21/19 w/ Jenny Courtney NP. Will message Dr. Chance to review. Jeni JIMÉNEZ

## 2019-06-12 NOTE — PROGRESS NOTES
Attempted to call pt with results & recommendations from Dr. Chance, left message for pt to call back. Jeni JIMÉNEZ

## 2019-06-12 NOTE — PROGRESS NOTES
Pt called back, informed of results & echo & stress echo & recommendations from Dr. Chance. Order in chart & pt transferred to scheduling to arrange. Jeni JIMÉNEZ

## 2019-06-13 NOTE — PROGRESS NOTES
Called pt with recommendations from Dr. Chance. Pt advised to keep apt 6/21/19 w/ Jenny as scheduled. Pt verbalized understanding. Jeni JIMÉNEZ

## 2019-06-13 NOTE — PROGRESS NOTES
Attempted to call pt with recommendations from Dr. Chance, left message for pt to call back. Jeni JIMÉNEZ

## 2019-06-13 NOTE — TELEPHONE ENCOUNTER
Her insurance refused payment for stress echo. They will almost certainly refuse payment for CT and will discuss with patient when she returns. Stress EKG almost certainly a false positive. deltax

## 2019-06-20 NOTE — PROGRESS NOTES
History of Present Illness:  [id number]    Impression/Plan:       It has been a pleasure seeing Leisa MANJEET Leong in follow up    Jenny Courtney, MSN, APRN-BC, CNP  Cardiology    No orders of the defined types were placed in this encounter.    No orders of the defined types were placed in this encounter.    There are no discontinued medications.      Encounter Diagnoses   Name Primary?     SVT (supraventricular tachycardia) (H)      Palpitations      Chest tightness or pressure        CURRENT MEDICATIONS:  Current Outpatient Medications   Medication Sig Dispense Refill     Acetaminophen (TYLENOL PO) Take 1,000 mg by mouth every 6 hours as needed Reported on 3/14/2017       albuterol (PROAIR HFA/PROVENTIL HFA/VENTOLIN HFA) 108 (90 BASE) MCG/ACT Inhaler Inhale 2 puffs into the lungs daily as needed for shortness of breath / dyspnea or wheezing       cetirizine (ZYRTEC) 10 MG tablet Take 10 mg by mouth At Bedtime       fluticasone-salmeterol (ADVAIR-HFA) 115-21 MCG/ACT inhaler Inhale 2 puffs into the lungs 2 times daily as needed        gabapentin (NEURONTIN) 300 MG capsule TAKE 1 CAPSULE BY MOUTH EVERYDAY AT BEDTIME  3     IBUPROFEN PO Take 600-800 mg by mouth 3 times daily as needed for moderate pain       magnesium 250 MG tablet Take 1 tablet by mouth daily       montelukast (SINGULAIR) 10 MG tablet Take 1 tablet (10 mg) by mouth At Bedtime Due for appt before further refills. 30 tablet 0     multivitamin, therapeutic with minerals (MULTI-VITAMIN) TABS tablet Take 1 tablet by mouth daily       NEW MED SHATAVARI  HERBAL SUPPLEMENT FOR HORMONE BALANCE       rizatriptan (MAXALT-MLT) 5 MG ODT Place 5 mg under the tongue       VITAMIN D, CHOLECALCIFEROL, PO Take 1 tablet by mouth daily         ALLERGIES     Allergies   Allergen Reactions     Liquid Adhesive Rash     Morphine Sulfate Anxiety     Tape [Adhesive Tape] Rash     Vicodin [Hydrocodone-Acetaminophen] Other (See Comments)     insomnia     Latex      SENSITIVITY  "TO LATEX GLOVES - \"HANDS FEEL SWOLLEN AFTER USING\"       PAST MEDICAL HISTORY:  Past Medical History:   Diagnosis Date     Anxiety and depression      Asthma     allergy induced     Endometriosis      Fam hx-cardiovas dis NEC     Father     Family history of diabetes mellitus     MGM     Fibroids      Headaches     randomly occurring, much better recently     HPV in female      Hx of previous reproductive problem      Migraine      Motion sickness      Other acne      Palpitations      PONV (postoperative nausea and vomiting)     high anxiety when awakening from anesthesia.....hx in the distant past of difficult intubation - this occurred in ...     Seasonal allergies      Spider veins      STD (female)        PAST SURGICAL HISTORY:  Past Surgical History:   Procedure Laterality Date     C LASIK (EMP) W OPT MYOPIA -3.12  2009    bilaterally     CERCLAGE CERVICAL N/A 12/10/2014    Procedure: CERCLAGE CERVICAL;  Surgeon: Marc Stapleton MD;  Location: [facility]     CERCLAGE CERVICAL N/A 2015    Procedure: CERCLAGE CERVICAL;  Surgeon: Mj Blum MD;  Location: [facility]      SECTION N/A 3/4/2015    Procedure:  SECTION;  Surgeon: Elle Howell MD;  Location: [facility]     CYSTECTOMY OVARIAN BENIGN  2013    Procedure: CYSTECTOMY OVARIAN BENIGN;;  Surgeon: Marc Stapleton MD;  Location: [facility]     DILATION AND CURETTAGE       DILATION AND CURETTAGE SUCTION  1/10/2013    Procedure: DILATION AND CURETTAGE SUCTION;  Suction D & C with Intraoperative Ultrasound;  Surgeon: Nubia Gregorio MD;  Location:  OR     DILATION AND CURETTAGE, OPERATIVE HYSTEROSCOPY WITH MORCELLATOR, COMBINED N/A 2016    Procedure: COMBINED DILATION AND CURETTAGE, OPERATIVE HYSTEROSCOPY WITH MORCELLATOR;  Surgeon: Nubia Gregorio MD;  Location: [facility]     EXCISE LESION PERINEAL  2014    Procedure: EXCISE LESION PERINEAL;  Surgeon: Marc Stapleton MD;  Location: [facility]     GYN SURGERY      " Laparotomy     HC CREATE EARDRUM OPENING,GEN ANESTH  infant    P.E. Tubes     HC HYSTEROSCOPY, SURGICAL; W/ REMOVAL LEIOMYOMATA  6/2007    4 cm fibroid     HC REMOVAL OF TONSILS,12+ Y/O  3/2008    Tonsils 10+y.o.     HC TOOTH EXTRACTION W/FORCEP  3/2007    wisdom teeth     HYSTEROSCOPY,REMOVE MYOMA  7/2008     LAPAROSCOPIC CYSTECTOMY OVARIAN (BENIGN) Left 4/25/2017    Procedure: LAPAROSCOPIC CYSTECTOMY OVARIAN (BENIGN);;  Surgeon: Nubia Gregorio MD;  Location:  OR     LAPAROSCOPIC LYSIS ADHESIONS N/A 8/9/2016    Procedure: LAPAROSCOPIC LYSIS ADHESIONS;  Surgeon: Nubia Gregorio MD;  Location: [facility]     LAPAROSCOPIC TUBAL DYE STUDY  6/12/2014    Procedure: LAPAROSCOPIC TUBAL DYE STUDY;  Surgeon: Marc Stapleton MD;  Location: [facility]     LAPAROSCOPIC TUBAL DYE STUDY  8/9/2016    Procedure: LAPAROSCOPIC TUBAL DYE STUDY;  Surgeon: Nubia Gregorio MD;  Location: [facility]     LAPAROSCOPY DIAGNOSTIC (GYN) N/A 4/25/2017    Procedure: LAPAROSCOPY DIAGNOSTIC (GYN);;  Surgeon: Nubia Gregorio MD;  Location:  OR     [facility] BILATERAL Bilateral 2008     MYOMECTOMY UTERUS  4/4/2013    Procedure: MYOMECTOMY UTERUS;  PELVIC LAPAROTOMY, Multiple MYOMECTOMY, RIGHT OVARIAN CYSTECTOMY, placement of seprafilm;  Surgeon: Marc Stapleton MD;  Location:  OR     OPERATIVE HYSTEROSCOPY WITH MORCELLATOR  6/12/2014    Procedure: OPERATIVE HYSTEROSCOPY WITH MORCELLATOR (ALVARADO & NEPHEW);  Surgeon: Marc Stapleton MD;  Location: [facility]     OPERATIVE HYSTEROSCOPY WITH MORCELLATOR N/A 9/23/2016    Procedure: OPERATIVE HYSTEROSCOPY WITH MORCELLATOR (ALVARADO & NEPHEW);  Surgeon: Nubia Gregorio MD;  Location: [facility]     OPERATIVE HYSTEROSCOPY WITH MORCELLATOR N/A 4/25/2017    Procedure: OPERATIVE HYSTEROSCOPY WITH MORCELLATOR (ALVARADO & NEPHEW);  HYSTEROSCOPY DILATION AND CURATTAGE WITH ULTRASOUND GUIDANCE,  LAPAROSCOPIC LYSIS OF ADHESIONS, LAPAROSCOPIC BILATERAL OVARIAN CYSTECTOMY;  Surgeon: Nubia Gregorio MD;   Location:  OR     pending  2011    removal of uterine fibroids     REMOVE CERCLAGE N/A 3/4/2015    Procedure: REMOVE CERCLAGE;  Surgeon: Elle Howell MD;  Location: [facility]     wisdom teeth       wisdom teeth extraction         FAMILY HISTORY:  Family History   Problem Relation Age of Onset     Depression Mother      Hypertension Father      Depression Father      Cancer Father         skin     Diabetes Maternal Grandmother      Depression Maternal Grandfather      Depression Paternal Grandmother      C.A.D. No family hx of      Breast Cancer No family hx of      Cancer - colorectal No family hx of        SOCIAL HISTORY:  Social History     Socioeconomic History     Marital status:      Spouse name: None     Number of children: 0     Years of education: 18     Highest education level: None   Occupational History     Employer: [employer]     Employer: NONE    Social Needs     Financial resource strain: None     Food insecurity:     Worry: None     Inability: None     Transportation needs:     Medical: None     Non-medical: None   Tobacco Use     Smoking status: Former Smoker     Packs/day: 0.25     Years: 13.00     Pack years: 3.25     Last attempt to quit: 2012     Years since quittin.7     Smokeless tobacco: Never Used     Tobacco comment: socially - 1 x per week maximum    Substance and Sexual Activity     Alcohol use: Yes     Alcohol/week: 0.0 oz     Comment: social     Drug use: No     Sexual activity: Yes     Partners: Male     Birth control/protection: Condom, Pill   Lifestyle     Physical activity:     Days per week: None     Minutes per session: None     Stress: None   Relationships     Social connections:     Talks on phone: None     Gets together: None     Attends Bahai service: None     Active member of club or organization: None     Attends meetings of clubs or organizations: None     Relationship status: None     Intimate partner violence:     Fear of current or  "ex partner: None     Emotionally abused: None     Physically abused: None     Forced sexual activity: None   Other Topics Concern      Service No     Blood Transfusions No     Comment: was denied ability to give blood/ 2002     Caffeine Concern Not Asked     Occupational Exposure Not Asked     Hobby Hazards No     Sleep Concern No     Stress Concern No     Weight Concern Yes     Comment: gained on depo     Special Diet No     Back Care Not Asked     Exercise Yes     Bike Helmet Not Asked     Seat Belt Yes     Self-Exams Yes     Parent/sibling w/ CABG, MI or angioplasty before 65F 55M? Not Asked   Social History Narrative     None       Review of Systems:  Skin:  Negative       Eyes:  Negative      ENT:  Negative      Respiratory:  Negative       Cardiovascular:    chest pain;Positive for nausea  Gastroenterology: Positive for nausea    Genitourinary:  Negative for      Musculoskeletal:  Positive for back pain;neck pain    Neurologic:  Positive for headaches    Psychiatric:  Negative for      Heme/Lymph/Imm:  Positive for allergies    Endocrine:  Negative for        Physical Exam:  Vitals: /74   Pulse 80   Ht 1.74 m (5' 8.5\")   Wt 93.4 kg (206 lb)   BMI 30.87 kg/m      Constitutional:  cooperative, alert and oriented, well developed, well nourished, in no acute distress overweight      Skin:  warm and dry to the touch          Head:  normocephalic        Eyes:  pupils equal and round        Lymph:      ENT:  no pallor or cyanosis, dentition good        Neck:  carotid pulses are full and equal bilaterally, JVP normal, no carotid bruit        Respiratory:  normal breath sounds, clear to auscultation, normal A-P diameter, normal symmetry, normal respiratory excursion, no use of accessory muscles         Cardiac: regular rhythm, normal S1/S2, no S3 or S4, apical impulse not displaced, no murmurs, gallops or rubs                pulses full and equal                                        GI:  not " assessed this visit        Extremities and Muscular Skeletal:  no deformities, clubbing, cyanosis, erythema observed;no edema              Neurological:  no gross motor deficits;affect appropriate        Psych:  Alert and Oriented x 3;affect appropriate, oriented to time, person and place Anxious    Recent Lab Results:  LIPID RESULTS:  No results found for: CHOL, HDL, LDL, TRIG, CHOLHDLRATIO    LIVER ENZYME RESULTS:  Lab Results   Component Value Date    AST 22 03/09/2015    ALT 38 03/09/2015       CBC RESULTS:  Lab Results   Component Value Date    WBC 8.2 11/27/2017    RBC 4.16 11/27/2017    HGB 13.2 11/27/2017    HCT 38.3 11/27/2017    MCV 92 11/27/2017    MCH 31.7 11/27/2017    MCHC 34.5 11/27/2017    RDW 12.2 11/27/2017     11/27/2017       BMP RESULTS:  Lab Results   Component Value Date     11/27/2017    POTASSIUM 3.9 11/27/2017    CHLORIDE 106 11/27/2017    CO2 21 11/27/2017    ANIONGAP 12 11/27/2017    GLC 88 11/27/2017    BUN 19 11/27/2017    CR 0.70 11/27/2017    GFRESTIMATED >90 11/27/2017    GFRESTBLACK >90 11/27/2017    MADAI 9.4 11/27/2017        A1C RESULTS:  Lab Results   Component Value Date    A1C 5.2 11/03/2010       INR RESULTS:  Lab Results   Component Value Date    INR 1.27 (H) 03/06/2015    INR 1.03 03/04/2015           CC  Maximo Chance MD  [id number]  [place] [id number]

## 2019-06-21 ENCOUNTER — OFFICE VISIT (OUTPATIENT)
Dept: CARDIOLOGY | Facility: CLINIC | Age: 34
End: 2019-06-21
Attending: INTERNAL MEDICINE
Payer: COMMERCIAL

## 2019-06-21 VITALS
SYSTOLIC BLOOD PRESSURE: 120 MMHG | BODY MASS INDEX: 30.51 KG/M2 | HEART RATE: 80 BPM | DIASTOLIC BLOOD PRESSURE: 74 MMHG | HEIGHT: 69 IN | WEIGHT: 206 LBS

## 2019-06-21 DIAGNOSIS — R00.2 PALPITATIONS: ICD-10-CM

## 2019-06-21 DIAGNOSIS — I47.10 SVT (SUPRAVENTRICULAR TACHYCARDIA) (H): ICD-10-CM

## 2019-06-21 DIAGNOSIS — R07.89 CHEST TIGHTNESS OR PRESSURE: ICD-10-CM

## 2019-06-21 DIAGNOSIS — N32.89 BLADDER ADHESIONS: Primary | ICD-10-CM

## 2019-06-21 PROCEDURE — 99215 OFFICE O/P EST HI 40 MIN: CPT | Performed by: NURSE PRACTITIONER

## 2019-06-21 ASSESSMENT — MIFFLIN-ST. JEOR: SCORE: 1680.85

## 2019-06-21 NOTE — LETTER
6/21/2019    Nubia Gregorio MD  [facility] [address]  [place] [address]    RE: Leisa Leong       Dear Colleague,    I had the pleasure of seeing Leisa Leong in the [facility].    History of Present Illness:  [id number]    Impression/Plan:       It has been a pleasure seeing Leisa Leong in follow up    Jenny Courtney, MSN, APRN-BC, CNP  Cardiology    No orders of the defined types were placed in this encounter.    No orders of the defined types were placed in this encounter.    There are no discontinued medications.      Encounter Diagnoses   Name Primary?     SVT (supraventricular tachycardia) (H)      Palpitations      Chest tightness or pressure        CURRENT MEDICATIONS:  Current Outpatient Medications   Medication Sig Dispense Refill     Acetaminophen (TYLENOL PO) Take 1,000 mg by mouth every 6 hours as needed Reported on 3/14/2017       albuterol (PROAIR HFA/PROVENTIL HFA/VENTOLIN HFA) 108 (90 BASE) MCG/ACT Inhaler Inhale 2 puffs into the lungs daily as needed for shortness of breath / dyspnea or wheezing       cetirizine (ZYRTEC) 10 MG tablet Take 10 mg by mouth At Bedtime       fluticasone-salmeterol (ADVAIR-HFA) 115-21 MCG/ACT inhaler Inhale 2 puffs into the lungs 2 times daily as needed        gabapentin (NEURONTIN) 300 MG capsule TAKE 1 CAPSULE BY MOUTH EVERYDAY AT BEDTIME  3     IBUPROFEN PO Take 600-800 mg by mouth 3 times daily as needed for moderate pain       magnesium 250 MG tablet Take 1 tablet by mouth daily       montelukast (SINGULAIR) 10 MG tablet Take 1 tablet (10 mg) by mouth At Bedtime Due for appt before further refills. 30 tablet 0     multivitamin, therapeutic with minerals (MULTI-VITAMIN) TABS tablet Take 1 tablet by mouth daily       NEW MED SHATAVARI  HERBAL SUPPLEMENT FOR HORMONE BALANCE       rizatriptan (MAXALT-MLT) 5 MG ODT Place 5 mg under the tongue       VITAMIN D, CHOLECALCIFEROL, PO Take 1 tablet by  "mouth daily         ALLERGIES     Allergies   Allergen Reactions     Liquid Adhesive Rash     Morphine Sulfate Anxiety     Tape [Adhesive Tape] Rash     Vicodin [Hydrocodone-Acetaminophen] Other (See Comments)     insomnia     Latex      SENSITIVITY TO LATEX GLOVES - \"HANDS FEEL SWOLLEN AFTER USING\"       PAST MEDICAL HISTORY:  Past Medical History:   Diagnosis Date     Anxiety and depression      Asthma     allergy induced     Endometriosis      Fam hx-cardiovas dis NEC     Father     Family history of diabetes mellitus     MGM     Fibroids      Headaches     randomly occurring, much better recently     HPV in female      Hx of previous reproductive problem      Migraine      Motion sickness      Other acne      Palpitations      PONV (postoperative nausea and vomiting)     high anxiety when awakening from anesthesia.....hx in the distant past of difficult intubation - this occurred in ...     Seasonal allergies      Spider veins      STD (female)        PAST SURGICAL HISTORY:  Past Surgical History:   Procedure Laterality Date     C LASIK (EMP) W OPT MYOPIA -3.12  2009    bilaterally     CERCLAGE CERVICAL N/A 12/10/2014    Procedure: CERCLAGE CERVICAL;  Surgeon: Marc Stapleton MD;  Location: [facility]     CERCLAGE CERVICAL N/A 2015    Procedure: CERCLAGE CERVICAL;  Surgeon: Mj Blum MD;  [facility]      SECTION N/A 3/4/2015    Procedure:  SECTION;  Surgeon: Elle Howell MD;  [facility]: [facility]     CYSTECTOMY OVARIAN BENIGN  2013    Procedure: CYSTECTOMY OVARIAN BENIGN;;  Surgeon: Marc Stapleton MD;  Location:  OR     DILATION AND CURETTAGE       DILATION AND CURETTAGE SUCTION  1/10/2013    Procedure: DILATION AND CURETTAGE SUCTION;  Suction D & C with Intraoperative Ultrasound;  Surgeon: Nubia Gregorio MD;  Location:  OR     DILATION AND CURETTAGE, OPERATIVE HYSTEROSCOPY WITH MORCELLATOR, COMBINED N/A 2016    Procedure: COMBINED DILATION AND " CURETTAGE, OPERATIVE HYSTEROSCOPY WITH MORCELLATOR;  Surgeon: Nubia Gregorio MD;  Location: [facility]     EXCISE LESION PERINEAL  6/12/2014    Procedure: EXCISE LESION PERINEAL;  Surgeon: Marc Stapleton MD;  Location: [facility]     GYN SURGERY      Laparotomy     HC CREATE EARDRUM OPENING,GEN ANESTH  infant    P.E. Tubes     HC HYSTEROSCOPY, SURGICAL; W/ REMOVAL LEIOMYOMATA  6/2007    4 cm fibroid     HC REMOVAL OF TONSILS,12+ Y/O  3/2008    Tonsils 10+y.o.     HC TOOTH EXTRACTION W/FORCEP  3/2007    wisdom teeth     HYSTEROSCOPY,REMOVE MYOMA  7/2008     LAPAROSCOPIC CYSTECTOMY OVARIAN (BENIGN) Left 4/25/2017    Procedure: LAPAROSCOPIC CYSTECTOMY OVARIAN (BENIGN);;  Surgeon: Nubia Gregorio MD;  Location:  OR     LAPAROSCOPIC LYSIS ADHESIONS N/A 8/9/2016    Procedure: LAPAROSCOPIC LYSIS ADHESIONS;  Surgeon: Nubia Gregorio MD;  Location: [facility]     LAPAROSCOPIC TUBAL DYE STUDY  6/12/2014    Procedure: LAPAROSCOPIC TUBAL DYE STUDY;  Surgeon: Marc Stapleton MD;  Location: [facility]     LAPAROSCOPIC TUBAL DYE STUDY  8/9/2016    Procedure: LAPAROSCOPIC TUBAL DYE STUDY;  Surgeon: Nubia Gregorio MD;  Location: [facility]     LAPAROSCOPY DIAGNOSTIC (GYN) N/A 4/25/2017    Procedure: LAPAROSCOPY DIAGNOSTIC (GYN);;  Surgeon: Nubia Gregorio MD;  Location: [facility] BILATERAL Bilateral 2008     MYOMECTOMY UTERUS  4/4/2013    Procedure: MYOMECTOMY UTERUS;  PELVIC LAPAROTOMY, Multiple MYOMECTOMY, RIGHT OVARIAN CYSTECTOMY, placement of seprafilm;  Surgeon: Marc Stapleton MD;  Location:  OR     OPERATIVE HYSTEROSCOPY WITH MORCELLATOR  6/12/2014    Procedure: OPERATIVE HYSTEROSCOPY WITH MORCELLATOR (ALVARADO & NEPHEW);  Surgeon: Marc Stapleton MD;  Location: [facility]     OPERATIVE HYSTEROSCOPY WITH MORCELLATOR N/A 9/23/2016    Procedure: OPERATIVE HYSTEROSCOPY WITH MORCELLATOR (ALVARADO & NEPHEW);  Surgeon: Nubia Gregorio MD;  Location: [facility]     OPERATIVE HYSTEROSCOPY WITH MORCELLATOR N/A 4/25/2017     Procedure: OPERATIVE HYSTEROSCOPY WITH MORCELLATOR ([employer] & [employer]PH[employer]);  HYSTEROSCOPY DILATION AND CURATTAGE WITH ULTRASOUND GUIDANCE,  LAPAROSCOPIC LYSIS OF ADHESIONS, LAPAROSCOPIC BILATERAL OVARIAN CYSTECTOMY;  Surgeon: Nubia Gregorio MD;  Location:  OR     pending  2011    removal of uterine fibroids     REMOVE CERCLAGE N/A 3/4/2015    Procedure: REMOVE CERCLAGE;  Surgeon: Elle Howell MD;  [facility]: [facility]     wisdom teeth       wisdom teeth extraction         FAMILY HISTORY:  Family History   Problem Relation Age of Onset     Depression Mother      Hypertension Father      Depression Father      Cancer Father         skin     Diabetes Maternal Grandmother      Depression Maternal Grandfather      Depression Paternal Grandmother      C.A.D. No family hx of      Breast Cancer No family hx of      Cancer - colorectal No family hx of        SOCIAL HISTORY:  Social History     Socioeconomic History     Marital status:      Spouse name: None     Number of children: 0     Years of education: 18     Highest education level: None   Occupational History     Employer: [employer]     Employer: NONE    Social Needs     Financial resource strain: None     Food insecurity:     Worry: None     Inability: None     Transportation needs:     Medical: None     Non-medical: None   Tobacco Use     Smoking status: Former Smoker     Packs/day: 0.25     Years: 13.00     Pack years: 3.25     Last attempt to quit: 2012     Years since quittin.7     Smokeless tobacco: Never Used     Tobacco comment: socially - 1 x per week maximum    Substance and Sexual Activity     Alcohol use: Yes     Alcohol/week: 0.0 oz     Comment: social     Drug use: No     Sexual activity: Yes     Partners: Male     Birth control/protection: Condom, Pill   Lifestyle     Physical activity:     Days per week: None     Minutes per session: None     Stress: None   Relationships     Social connections:     Talks on phone:  "None     Gets together: None     Attends [employer] service: None     Active member of club or organization: None     Attends meetings of clubs or organizations: None     Relationship status: None     Intimate partner violence:     Fear of current or ex partner: None     Emotionally abused: None     Physically abused: None     Forced sexual activity: None   Other Topics Concern      Service No     Blood Transfusions No     Comment: was denied ability to give blood/ 2002     Caffeine Concern Not Asked     Occupational Exposure Not Asked     Hobby Hazards No     Sleep Concern No     Stress Concern No     Weight Concern Yes     Comment: gained on depo     Special Diet No     Back Care Not Asked     Exercise Yes     Bike Helmet Not Asked     Seat Belt Yes     Self-Exams Yes     Parent/sibling w/ CABG, MI or angioplasty before 65F 55M? Not Asked   Social History Narrative     None       Review of Systems:  Skin:  Negative       Eyes:  Negative      ENT:  Negative      Respiratory:  Negative       Cardiovascular:    chest pain;Positive for nausea  Gastroenterology: Positive for nausea    Genitourinary:  Negative for      Musculoskeletal:  Positive for back pain;neck pain    Neurologic:  Positive for headaches    Psychiatric:  Negative for      Heme/Lymph/Imm:  Positive for allergies    Endocrine:  Negative for        Physical Exam:  Vitals: /74   Pulse 80   Ht 1.74 m (5' 8.5\")   Wt 93.4 kg (206 lb)   BMI 30.87 kg/m       Constitutional:  cooperative, alert and oriented, well developed, well nourished, in no acute distress overweight      Skin:  warm and dry to the touch          Head:  normocephalic        Eyes:  pupils equal and round        Lymph:      ENT:  no pallor or cyanosis, dentition good        Neck:  carotid pulses are full and equal bilaterally, JVP normal, no carotid bruit        Respiratory:  normal breath sounds, clear to auscultation, normal A-P diameter, normal symmetry, normal " respiratory excursion, no use of accessory muscles         Cardiac: regular rhythm, normal S1/S2, no S3 or S4, apical impulse not displaced, no murmurs, gallops or rubs                pulses full and equal                                        GI:  not assessed this visit        Extremities and Muscular Skeletal:  no deformities, clubbing, cyanosis, erythema observed;no edema              Neurological:  no gross motor deficits;affect appropriate        Psych:  Alert and Oriented x 3;affect appropriate, oriented to time, person and place Anxious    Recent Lab Results:  LIPID RESULTS:  No results found for: CHOL, HDL, LDL, TRIG, CHOLHDLRATIO    LIVER ENZYME RESULTS:  Lab Results   Component Value Date    AST 22 03/09/2015    ALT 38 03/09/2015       CBC RESULTS:  Lab Results   Component Value Date    WBC 8.2 11/27/2017    RBC 4.16 11/27/2017    HGB 13.2 11/27/2017    HCT 38.3 11/27/2017    MCV 92 11/27/2017    MCH 31.7 11/27/2017    MCHC 34.5 11/27/2017    RDW 12.2 11/27/2017     11/27/2017       BMP RESULTS:  Lab Results   Component Value Date     11/27/2017    POTASSIUM 3.9 11/27/2017    CHLORIDE 106 11/27/2017    CO2 21 11/27/2017    ANIONGAP 12 11/27/2017    GLC 88 11/27/2017    BUN 19 11/27/2017    CR 0.70 11/27/2017    GFRESTIMATED >90 11/27/2017    GFRESTBLACK >90 11/27/2017    MADAI 9.4 11/27/2017        A1C RESULTS:  Lab Results   Component Value Date    A1C 5.2 11/03/2010       INR RESULTS:  Lab Results   Component Value Date    INR 1.27 (H) 03/06/2015    INR 1.03 03/04/2015           CC  Maximo Chance MD  [address]  [place] [address]                  Service Date: 06/21/2019      HISTORY OF PRESENT ILLNESS:      Leisa Leong is a 34-year-old female who recently met Dr. Maximo Alves.  Since last summer, she has had intermittent palpitations.  She has a multitude of symptoms today, including intermittent vertigo, which is not necessarily related to the palpitations or  SVT.  A Ziopatch monitor was placed through [facility], which shows PACs and short runs of SVT.  Her magnesium, thyroid levels and electrolyte panel are normal.  She has a history of asthma, but has not noted any tachycardias with her inhaler therapy.      She has a father who  several months ago from congestive heart failure due to pulmonary hypertension.  He had atrial fibrillation, ventricular tachycardia and a PFO that was closed.      He did have a sudden death, but had chronic thromboembolic pulmonary hypertension managed by Dr. Vasques.      This patient's daughter apparently was born with a PFO which was reportedly closed with medical therapy as a baby.      She describes, also, different types of chest discomfort.  There is a pressure that occurs on the right side of her chest that can sometimes be associated with nausea.  This is typically happening without exertion.  She occasionally has a sharp discomfort in the center of her chest when she is having the palpitations.  She has no exertional chest discomfort at this time.  She is not being awakened with chest discomfort.      She has significant dyslipidemia.  Labs were checked through [facility] in April showing a total cholesterol of 244, HDL 48, , triglyceride 294.  She is trying lifestyle modification.  She states at that time she was under stress from the loss of her father and was eating more carbohydrates than usual.  She stopped caffeine intake when she wore her Ziopatch monitor.      She was sent for a stress echocardiogram.  Her Tomlin score was intermediate.  The stress EKG was nondiagnostic due to preexisting EKG abnormalities.  This appeared to be a normal stress echo; however, it was indeterminate, and a CT angiogram, stress nuclear study or stress MRI was suggested.  Unfortunately, [employer] is not covering her stress echo and likely will not cover a CT angiogram.  She is going to try to call to see what the issue is.  I also  have the number to call to see what I can do.      Echocardiography on the stress echo noted a trileaflet aortic valve.  On her full echo, the reader mentions the aortic valve was not well visualized, and there could be some nodular sclerosis, which would be atypical for her age.  Otherwise, her test was unremarkable.      I do not hear a murmur today.  Her exam is otherwise unremarkable.      IMPRESSION AND PLAN:   1.  Palpitations, likely from PACs and SVT.  All of her chemistries are normal.  I have reassured her that these are likely benign.  She has had no syncope or presyncope.   2.  History of asthma.   3.  Family history of thromboembolic disease-associated pulmonary hypertension.  Her dad had sudden death from this.   4.  Chest discomfort, unclear cause.  Her stress EKG was indeterminant, her Tomlin score was indeterminate, and we suggest further workup for ischemic disease.  [employer] currently is not willing to cover her stress echo that she has had done.  Therefore, I doubt they will cover a stress nuclear scan.  We need a [id number] review, which we will try to do.  The patient is going to call as well to see if it is just a matter of sending more records.  Once she calls and I have an opportunity to call, we will reconvene and further outline a plan.     5.  Dyslipidemia.  I have encouraged her to continue lifestyle modifications.  Her labs should be rechecked probably this fall, giving her at least 3-6 months of lifestyle changes to reflect improvements.      It has been a pleasure meeting Leisa in followup today.  Greater than 50% of this 45 minute visit today was spent in counseling and collaboration.         SAIDA SCHULTZ NP             D: 2019   T: 2019   MT: enzo      Name:     LEISA ALVAREZ   MRN:      [id number]        Account:      [id number]   :      1985           Service Date: 2019      Document: [id number]        Thank you for allowing  me to participate in the care of your patient.      Sincerely,     KIM Tavarez [facility]    cc:   Maximo Chance MD  [address]  [place] [address]

## 2019-06-21 NOTE — LETTER
2019      Nubia Gregorio MD  [facility] [address]  [facility] [address]      RE: Leisa Leong       Dear Colleague,    I had the pleasure of seeing Leisa Leong in the [facility].    Service Date: 2019      HISTORY OF PRESENT ILLNESS:      Leisa Leong is a 34-year-old female who recently met Dr. Maximo Alves.  Since last summer, she has had intermittent palpitations.  She has a multitude of symptoms today, including intermittent vertigo, which is not necessarily related to the palpitations or SVT.  A Ziopatch monitor was placed through [facility], which shows PACs and short runs of SVT.  Her magnesium, thyroid levels and electrolyte panel are normal.  She has a history of asthma, but has not noted any tachycardias with her inhaler therapy.      She has a father who  several months ago from congestive heart failure due to pulmonary hypertension.  He had atrial fibrillation, ventricular tachycardia and a PFO that was closed.      He did have a sudden death, but had chronic thromboembolic pulmonary hypertension managed by Dr. Vasques.      This patient's daughter apparently was born with a PFO which was reportedly closed with medical therapy as a baby.      She describes, also, different types of chest discomfort.  There is a pressure that occurs on the right side of her chest that can sometimes be associated with nausea.  This is typically happening without exertion.  She occasionally has a sharp discomfort in the center of her chest when she is having the palpitations.  She has no exertional chest discomfort at this time.  She is not being awakened with chest discomfort.      She has significant dyslipidemia.  Labs were checked through [facility] in April showing a total cholesterol of 244, HDL 48, , triglyceride 294.  She is trying lifestyle modification.  She states at that time she was under stress from the loss of her father and  was eating more carbohydrates than usual.  She stopped caffeine intake when she wore her Ziopatch monitor.      She was sent for a stress echocardiogram.  Her Tomlin score was intermediate.  The stress EKG was nondiagnostic due to preexisting EKG abnormalities.  This appeared to be a normal stress echo; however, it was indeterminate, and a CT angiogram, stress nuclear study or stress MRI was suggested.  Unfortunately, [employer] is not covering her stress echo and likely will not cover a CT angiogram.  She is going to try to call to see what the issue is.  I also have the number to call to see what I can do.      Echocardiography on the stress echo noted a trileaflet aortic valve.  On her full echo, the reader mentions the aortic valve was not well visualized, and there could be some nodular sclerosis, which would be atypical for her age.  Otherwise, her test was unremarkable.      I do not hear a murmur today.  Her exam is otherwise unremarkable.      IMPRESSION AND PLAN:   1.  Palpitations, likely from PACs and SVT.  All of her chemistries are normal.  I have reassured her that these are likely benign.  She has had no syncope or presyncope.   2.  History of asthma.   3.  Family history of thromboembolic disease-associated pulmonary hypertension.  Her dad had sudden death from this.   4.  Chest discomfort, unclear cause.  Her stress EKG was indeterminant, her Tomlin score was indeterminate, and we suggest further workup for ischemic disease.  [facility] currently is not willing to cover her stress echo that she has had done.  Therefore, I doubt they will cover a stress nuclear scan.  We need a [id number] review, which we will try to do.  The patient is going to call as well to see if it is just a matter of sending more records.  Once she calls and I have an opportunity to call, we will reconvene and further outline a plan.     5.  Dyslipidemia.  I have encouraged her to continue lifestyle  modifications.  Her labs should be rechecked probably this fall, giving her at least 3-6 months of lifestyle changes to reflect improvements.      It has been a pleasure meeting Leisa in followup today.  Greater than 50% of this 45 minute visit today was spent in counseling and collaboration.         SAIDA SCHULTZ NP             D: 2019   T: 2019   MT: enzo      Name:     LEISA ALVAREZ   MRN:      [id number]        Account:      [id number]   :      1985           Service Date: 2019      Document: [id number]           Outpatient Encounter Medications as of 2019   Medication Sig Dispense Refill     Acetaminophen (TYLENOL PO) Take 1,000 mg by mouth every 6 hours as needed Reported on 3/14/2017       albuterol (PROAIR HFA/PROVENTIL HFA/VENTOLIN HFA) 108 (90 BASE) MCG/ACT Inhaler Inhale 2 puffs into the lungs daily as needed for shortness of breath / dyspnea or wheezing       cetirizine (ZYRTEC) 10 MG tablet Take 10 mg by mouth At Bedtime       fluticasone-salmeterol (ADVAIR-HFA) 115-21 MCG/ACT inhaler Inhale 2 puffs into the lungs 2 times daily as needed        gabapentin (NEURONTIN) 300 MG capsule TAKE 1 CAPSULE BY MOUTH EVERYDAY AT BEDTIME  3     IBUPROFEN PO Take 600-800 mg by mouth 3 times daily as needed for moderate pain       magnesium 250 MG tablet Take 1 tablet by mouth daily       montelukast (SINGULAIR) 10 MG tablet Take 1 tablet (10 mg) by mouth At Bedtime Due for appt before further refills. 30 tablet 0     multivitamin, therapeutic with minerals (MULTI-VITAMIN) TABS tablet Take 1 tablet by mouth daily       NEW MED SHATAVARI  HERBAL SUPPLEMENT FOR HORMONE BALANCE       rizatriptan (MAXALT-MLT) 5 MG ODT Place 5 mg under the tongue       VITAMIN D, CHOLECALCIFEROL, PO Take 1 tablet by mouth daily       No facility-administered encounter medications on file as of 2019.        Again, thank you for allowing me to participate in the care of your patient.       Sincerely,    KIM Tavarez CNP     [facility]

## 2019-06-21 NOTE — PATIENT INSTRUCTIONS
Call insurance and see what they need to cover tests    I will try to call    We will outline final plan when we hear back from insurance

## 2019-06-21 NOTE — PROGRESS NOTES
Service Date: 2019      HISTORY OF PRESENT ILLNESS:      Leisa Leong is a 34-year-old female who recently met Dr. Maximo Alves.  Since last summer, she has had intermittent palpitations.  She has a multitude of symptoms today, including intermittent vertigo, which is not necessarily related to the palpitations or SVT.  A Ziopatch monitor was placed through [facility], which shows PACs and short runs of SVT.  Her magnesium, thyroid levels and electrolyte panel are normal.  She has a history of asthma, but has not noted any tachycardias with her inhaler therapy.      She has a father who  several months ago from congestive heart failure due to pulmonary hypertension.  He had atrial fibrillation, ventricular tachycardia and a PFO that was closed.      He did have a sudden death, but had chronic thromboembolic pulmonary hypertension managed by Dr. Vasques.      This patient's daughter apparently was born with a PFO which was reportedly closed with medical therapy as a baby.      She describes, also, different types of chest discomfort.  There is a pressure that occurs on the right side of her chest that can sometimes be associated with nausea.  This is typically happening without exertion.  She occasionally has a sharp discomfort in the center of her chest when she is having the palpitations.  She has no exertional chest discomfort at this time.  She is not being awakened with chest discomfort.      She has significant dyslipidemia.  Labs were checked through [facility] in April showing a total cholesterol of 244, HDL 48, , triglyceride 294.  She is trying lifestyle modification.  She states at that time she was under stress from the loss of her father and was eating more carbohydrates than usual.  She stopped caffeine intake when she wore her Ziopatch monitor.      She was sent for a stress echocardiogram.  Her Tomlin score was intermediate.  The stress EKG was nondiagnostic due to preexisting EKG  abnormalities.  This appeared to be a normal stress echo; however, it was indeterminate, and a CT angiogram, stress nuclear study or stress MRI was suggested.  Unfortunately, [employer] is not covering her stress echo and likely will not cover a CT angiogram.  She is going to try to call to see what the issue is.  I also have the number to call to see what I can do.      Echocardiography on the stress echo noted a trileaflet aortic valve.  On her full echo, the reader mentions the aortic valve was not well visualized, and there could be some nodular sclerosis, which would be atypical for her age.  Otherwise, her test was unremarkable.      I do not hear a murmur today.  Her exam is otherwise unremarkable.      IMPRESSION AND PLAN:   1.  Palpitations, likely from PACs and SVT.  All of her chemistries are normal.  I have reassured her that these are likely benign.  She has had no syncope or presyncope.   2.  History of asthma.   3.  Family history of thromboembolic disease-associated pulmonary hypertension.  Her dad had sudden death from this.   4.  Chest discomfort, unclear cause.  Her stress EKG was indeterminant, her Tomlin score was indeterminate, and we suggest further workup for ischemic disease.  [employer] currently is not willing to cover her stress echo that she has had done.  Therefore, I doubt they will cover a stress nuclear scan.  We need a [id number] review, which we will try to do.  The patient is going to call as well to see if it is just a matter of sending more records.  Once she calls and I have an opportunity to call, we will reconvene and further outline a plan.     5.  Dyslipidemia.  I have encouraged her to continue lifestyle modifications.  Her labs should be rechecked probably this fall, giving her at least 3-6 months of lifestyle changes to reflect improvements.      It has been a pleasure meeting Leisa in followup today.  Greater than 50% of this 45 minute visit  today was spent in counseling and collaboration.         SAIDA SCHULTZ NP             D: 2019   T: 2019   MT: enzo      Name:     ZEENAT ALVAREZ   MRN:      [id number]        Account:      [id number]   :      1985           Service Date: 2019      Document: [id number]

## 2019-06-26 ENCOUNTER — OFFICE VISIT (OUTPATIENT)
Dept: UROLOGY | Facility: CLINIC | Age: 34
End: 2019-06-26
Payer: COMMERCIAL

## 2019-06-26 ENCOUNTER — HOSPITAL ENCOUNTER (OUTPATIENT)
Dept: ULTRASOUND IMAGING | Facility: CLINIC | Age: 34
Discharge: HOME | End: 2019-06-26
Attending: UROLOGY | Admitting: UROLOGY
Payer: COMMERCIAL

## 2019-06-26 VITALS
DIASTOLIC BLOOD PRESSURE: 68 MMHG | SYSTOLIC BLOOD PRESSURE: 110 MMHG | BODY MASS INDEX: 31.22 KG/M2 | HEART RATE: 71 BPM | OXYGEN SATURATION: 98 % | HEIGHT: 68 IN | WEIGHT: 206 LBS

## 2019-06-26 DIAGNOSIS — N32.89 BLADDER ADHESIONS: ICD-10-CM

## 2019-06-26 DIAGNOSIS — Z85.51 PERSONAL HISTORY OF MALIGNANT NEOPLASM OF BLADDER: Primary | ICD-10-CM

## 2019-06-26 DIAGNOSIS — Z79.2 PROPHYLACTIC ANTIBIOTIC: ICD-10-CM

## 2019-06-26 LAB
ALBUMIN UR-MCNC: NEGATIVE MG/DL
APPEARANCE UR: CLEAR
BILIRUB UR QL STRIP: NEGATIVE
COLOR UR AUTO: YELLOW
GLUCOSE UR STRIP-MCNC: NEGATIVE MG/DL
HGB UR QL STRIP: NEGATIVE
KETONES UR STRIP-MCNC: NEGATIVE MG/DL
LEUKOCYTE ESTERASE UR QL STRIP: NEGATIVE
NITRATE UR QL: NEGATIVE
PH UR STRIP: 5.5 PH (ref 5–7)
SOURCE: NORMAL
SP GR UR STRIP: >1.03 (ref 1–1.03)
UROBILINOGEN UR STRIP-ACNC: 0.2 EU/DL (ref 0.2–1)

## 2019-06-26 PROCEDURE — 52000 CYSTOURETHROSCOPY: CPT | Performed by: UROLOGY

## 2019-06-26 PROCEDURE — 99213 OFFICE O/P EST LOW 20 MIN: CPT | Mod: 25 | Performed by: UROLOGY

## 2019-06-26 PROCEDURE — 81003 URINALYSIS AUTO W/O SCOPE: CPT | Performed by: UROLOGY

## 2019-06-26 PROCEDURE — 76770 US EXAM ABDO BACK WALL COMP: CPT

## 2019-06-26 RX ORDER — CIPROFLOXACIN 500 MG/1
500 TABLET, FILM COATED ORAL ONCE
Qty: 1 TABLET | Refills: 0 | Status: SHIPPED | OUTPATIENT
Start: 2019-06-26 | End: 2019-07-02

## 2019-06-26 RX ORDER — CEFAZOLIN SODIUM 1 G
1 VIAL (EA) INJECTION SEE ADMIN INSTRUCTIONS
Status: CANCELLED | OUTPATIENT
Start: 2019-06-26

## 2019-06-26 ASSESSMENT — MIFFLIN-ST. JEOR: SCORE: 1672.91

## 2019-06-26 NOTE — PROGRESS NOTES
It is a pleasure to see this pleasant 34-year-old lady who returns today for cystoscopy.  She has had a number of laparoscopic procedures to divide adhesions in the pelvis for which she has obtained some relief.  There is history that she also has endometriosis which can also cause problems with pain in the pelvis and pelvic pain.  I think it unlikely that she has a primary urologic cause for this given the lack of any significant urologic symptoms at present.  She does have significant pelvic pain at intervals, although I doubt that tethering of the bladder by adhesions is attributable to this.  There are some significant quite understandable factors such as stress related issues due to the recent death of her father, and the fact that her  is moved out of state for work for the time being although she and her mother will be joining him in [place] later.  These of course will be factors which may be aggravating her current symptoms.  I am going to recommend we proceed as follows.  1.  I had a discussion with her about what to avoid including caffeine, cranberry juice, alcohol and spicy foods.  2.  I am going to encourage her to take regular warm baths which are very effective and rather relaxing the pelvic floor muscles  3.  I am going to arrange for cystoscopy and an ultrasound of the kidneys to evaluate the entire urinary tract carefully.    The symptoms are now relatively mild having followed the instructions which I previously laid out to as noted above.  ULTRASOUND RENAL COMPLETE  6/26/2019 8:27 AM      HISTORY: Bladder adhesions.     COMPARISON: None.      FINDINGS:   Right kidney: Normal size and morphology. Renal cortical thickness and  echogenicity are normal. No hydronephrosis, mass lesions or abnormal  calcifications.     Left kidney: Normal size and morphology. Renal cortical thickness and  echogenicity are normal. No hydronephrosis, mass lesions or abnormal  calcifications.     Urinary bladder:  Partially distended urinary bladder is unremarkable.                                                                       IMPRESSION: Normal bilateral renal ultrasound.     Procedure.  Cystoscopy.   Surgeon.    Amandeep  Anesthesia.  Local anesthesia.  Description.  With the patient in the dorsolithotomy position, with the genital area prepped and draped in the customary fashion, with local anesthetic and the urethra, the flexible cystoscope was Inserted.  The urethra was unremarkable.  The interior of the bladder was carefully inspected.  The ureteral orifices are in normal position there is no evidence of stone in the bladder.  There is no evidence of trabeculation.  However on the left side of the bladder lateral to the left ureteric orifice is a small papillary tumor without evidence of surface encrustations.  There are no other lesions seen in the bladder.  There were no other remarkable features.    I did a careful pelvic examination today.  This revealed the following.  Normal external genitalia.  Urethral meatus unremarkable.  No evidence of post urethral or post vesicle tenderness.  Normal with normal pelvic floor tonicity.    Impression.  The symptoms which she has been describing have nothing to do with the findings in the bladder.  However there was little evidence of overactivity of the pelvic floor which is reassuring.  However the lesion in the bladder may well be a low-grade bladder cancer.  I had a careful and fairly lengthy discussion with her about this  She will need a biopsy probably resection of the small tumor in the near future and outpatient surgery under anesthesia.  This may be a benign papilloma but there is a possibility it could be a low-grade bladder cancer.  If it is the latter, then the bladder will need to be inspected on a regular basis because of the potential for local recurrence although the dangers are in the long-term is probably small.  I did carefully discussed the  "procedure, alternative therapies and other potential therapies that may be considered in the future depending on the pathology report.  Went over the entire situation with her in detail today.  Included review of the ultrasound examination as well which is quite normal.  I answered all her questions    Plan.  Cystoscopy with bladder biopsy and transurethral resection of the bilateral retrograde pyelograms and same-day surgery    15 minutes spent in addition to procedure in order to discuss the findings to go over.he therapeutic options as noted above discussed reviewed previous records, current records discuss the pertinent studies as noted above    \"This dictation was performed with voice recognition software and may contain errors,  omissions and inadvertent word substitution.\"       "

## 2019-06-26 NOTE — LETTER
6/26/2019       RE: Leisa Leong  [address] [address]     Dear Colleague,    Thank you for referring your patient, Leisa Leong, to the [facility] [place] at [facility]. Please see a copy of my visit note below.    It is a pleasure to see this pleasant 34-year-old lady who returns today for cystoscopy.  She has had a number of laparoscopic procedures to divide adhesions in the pelvis for which she has obtained some relief.  There is history that she also has endometriosis which can also cause problems with pain in the pelvis and pelvic pain.  I think it unlikely that she has a primary urologic cause for this given the lack of any significant urologic symptoms at present.  She does have significant pelvic pain at intervals, although I doubt that tethering of the bladder by adhesions is attributable to this.  There are some significant quite understandable factors such as stress related issues due to the recent death of her father, and the fact that her  is moved out of state for work for the time being although she and her mother will be joining him in [place] later.  These of course will be factors which may be aggravating her current symptoms.  I am going to recommend we proceed as follows.  1.  I had a discussion with her about what to avoid including caffeine, cranberry juice, alcohol and spicy foods.  2.  I am going to encourage her to take regular warm baths which are very effective and rather relaxing the pelvic floor muscles  3.  I am going to arrange for cystoscopy and an ultrasound of the kidneys to evaluate the entire urinary tract carefully.    The symptoms are now relatively mild having followed the instructions which I previously laid out to as noted above.  ULTRASOUND RENAL COMPLETE  6/26/2019 8:27 AM      HISTORY: Bladder adhesions.     COMPARISON: None.      FINDINGS:   Right kidney: Normal  size and morphology. Renal cortical thickness and  echogenicity are normal. No hydronephrosis, mass lesions or abnormal  calcifications.     Left kidney: Normal size and morphology. Renal cortical thickness and  echogenicity are normal. No hydronephrosis, mass lesions or abnormal  calcifications.     Urinary bladder: Partially distended urinary bladder is unremarkable.                                                                       IMPRESSION: Normal bilateral renal ultrasound.     Procedure.  Cystoscopy.   Surgeon.    Amandeep  Anesthesia.  Local anesthesia.  Description.  With the patient in the dorsolithotomy position, with the genital area prepped and draped in the customary fashion, with local anesthetic and the urethra, the flexible cystoscope was Inserted.  The urethra was unremarkable.  The interior of the bladder was carefully inspected.  The ureteral orifices are in normal position there is no evidence of stone in the bladder.  There is no evidence of trabeculation.  However on the left side of the bladder lateral to the left ureteric orifice is a small papillary tumor without evidence of surface encrustations.  There are no other lesions seen in the bladder.  There were no other remarkable features.    I did a careful pelvic examination today.  This revealed the following.  Normal external genitalia.  Urethral meatus unremarkable.  No evidence of post urethral or post vesicle tenderness.  Normal with normal pelvic floor tonicity.    Impression.  The symptoms which she has been describing have nothing to do with the findings in the bladder.  However there was little evidence of overactivity of the pelvic floor which is reassuring.  However the lesion in the bladder may well be a low-grade bladder cancer.  I had a careful and fairly lengthy discussion with her about this  She will need a biopsy probably resection of the small tumor in the near future and outpatient surgery under anesthesia.  This may  "be a benign papilloma but there is a possibility it could be a low-grade bladder cancer.  If it is the latter, then the bladder will need to be inspected on a regular basis because of the potential for local recurrence although the dangers are in the long-term is probably small.  I did carefully discussed the procedure, alternative therapies and other potential therapies that may be considered in the future depending on the pathology report.  Went over the entire situation with her in detail today.  Included review of the ultrasound examination as well which is quite normal.  I answered all her questions    Plan.  Cystoscopy with bladder biopsy and transurethral resection of the bilateral retrograde pyelograms and same-day surgery    15 minutes spent in addition to procedure in order to discuss the findings to go over.he therapeutic options as noted above discussed reviewed previous records, current records discuss the pertinent studies as noted above    \"This dictation was performed with voice recognition software and may contain errors,  omissions and inadvertent word substitution.\"         Again, thank you for allowing me to participate in the care of your patient.      Sincerely,    Mandeep Bernstein MD      "

## 2019-06-27 ENCOUNTER — TELEPHONE (OUTPATIENT)
Dept: CARDIOLOGY | Facility: CLINIC | Age: 34
End: 2019-06-27

## 2019-06-27 ENCOUNTER — CARE COORDINATION (OUTPATIENT)
Dept: CARDIOLOGY | Facility: CLINIC | Age: 34
End: 2019-06-27

## 2019-06-27 NOTE — LETTER
[facility]  [address]  [place] [address]  Phone: [phone]  Fax: [phone]       2019    Count includes the [facility]  [address]  [place], [place] [address]         Re: Leisa Leong  Subscriber ID: [id number]  YOB: 1985         To whom it may concern:    I am writing on behalf of my patient, Leisa Leong, to document the medical necessity of obtaining a CT coronary angiogram, scheduled on 19, and provide information about the patient s medical history, symptoms and treatment.    Listed below are the patient s medical history, symptoms and treatment plan, which confirm the medical necessity and appropriateness of obtaining a CT coronary angiogram on 19.    Medical history:   -Significant dyslipidemia noted in 2019: total cholesterol of 244, HDL 48, , triglyceride 294   -Father  of chronic thromboembolic pulmonary hypertension    Symptoms:   -Chest discomfort: upper right chest discomfort, unclear cause;  occasional sharp discomfort, which is in the center of her chest and can be associated with the palpitations or/or nausea and can last for half a day  -Palpitations: extra heartbeats followed by a pause and then a strong heartbeat and maybe 2 or 3 extra beats  after that; sudden rapid heartbeat for a few seconds. Ziopatch done 2019 revealed 2 episodes of SVT, longest being 7 beats.   -Intermittent vertigo     Treatment plan:  -Echocardiogram done to determine if there is any evidence of structural heart disease that could be triggering these arrhythmias; no structural heart disease found.    -Stress Echocardiogram done to see if arrhythmias triggered with exercise and determine if there is any evidence of ischemia; Tomlin score was indeterminate, and further workup with a CT coronary angiogram to assess for ischemic disease recommended.        Of note, Leisa had a cystoscopy done  6/26/19 for assessment of pelvic pain and a lesion in the bladder was found that is suspicious for bladder cancer and the urologist recommends she have a cystoscopy with bladder biopsy and transurethral resection of the bilateral retrograde pyelograms under general anesthesia in the near future.  Surgery is scheduled 7/11/19.        Due to Leisa's upcoming surgery with general anesthesia, she is now scheduled for cardiac clearance with Dr. Chacne on 7/8/19 and the CT coronary angiogram is necessary in order to be able to clear her for her upcoming surgery under general anesthesia on 7/11/19.     Please contact me at [phone] for any additional information you may need to ensure prompt approval of the CT coronary angiogram for this patient.  Thank you for your attention to this important matter.     Sincerely,               Jenny Courtney, CNP with Dr. Maximo Chance

## 2019-06-27 NOTE — TELEPHONE ENCOUNTER
----- Message from Octavia Barrios sent at 6/27/2019  8:56 AM CDT -----  Regarding: Mdkb-ro-[id number] needed for CCTA  Dr. Tyree Alves (or nurses),    The authorization for procedure Coronary Artery Angio CTA on date of service 7/2/2019 is pending denial. We were unsuccessful in obtaining approval through clinical review. I understand we had issues receiving an auth just recently when the patient was scheduled for a different date, but this has since been rescheduled and an auth has been started. A [id number] review can be done by calling the insurance/third party authorization vendor with the following information:    Insurance: [facility] [place]  Auth Vendor:  [employer]  Phone:  [phone] option 2  Due:  ASAP    Patient ID: [id number]  Case/Ref #: NA    Denial Reason: Does not meet medical necessity    Please complete this as soon as you are able and let me know when it is done.    Thank you,    Octavia Barrios  [facility]

## 2019-06-27 NOTE — PROGRESS NOTES
Letter of medical necessity written for PA for CT coronary angiogram scheduled on 7/2/19.  ETHAN Omalley reviewed letter and has signed it.  Contacted LAWANDA Borjas (Brian, ph: [phone]) and requested that letter of medical necessity be submitted with PA.      TINO Diaz 9:59 AM 6/27/2019

## 2019-06-29 ENCOUNTER — MYC MEDICAL ADVICE (OUTPATIENT)
Dept: CARDIOLOGY | Facility: CLINIC | Age: 34
End: 2019-06-29

## 2019-06-29 DIAGNOSIS — Z13.6 SCREENING FOR CARDIOVASCULAR CONDITION: Primary | ICD-10-CM

## 2019-07-01 NOTE — TELEPHONE ENCOUNTER
Called and spoke with pt.  Reviewed that should hear from  Financial today on whether or not CTA is covered or not.  If CTA is covered, plan will be to have CTA tomorrow and ETHAN Omalley agrees to reviewing with Dr. Chance and seeing her for cardiac clearance on 7/5/19 at 9:30am. If CTA is not covered, plan will be to review plan with Dr. Chance this afternoon.  She verbalized understanding and agrees with this plan.    TINO Diaz 8:59 AM 7/1/2019

## 2019-07-01 NOTE — TELEPHONE ENCOUNTER
Already went through peer to peer call. They NEVER called back though I left my cell phone number. Discussed case with another nurse . Going for surgery. Seeing Jenny.

## 2019-07-01 NOTE — TELEPHONE ENCOUNTER
"Received update from  [employer] that states:  \"received a voicemail from [facility] this weekend that they did receive this letter of medical necessity, however the auth is still denied because it does not meet their guidelines.\"    Reviewed with Dr. Chance who reviewed pt's Stress Echo results again and states that he is comfortable clearing her for surgery based on these results.  He also agrees for ETHAN Omalley to see pt on 7/5/19.      Called and spoke with pt.  Reviewed above information and she requests to know if she can have CT calcium score done for her own peace of mind and she states she is fine paying out of pocket for this test.  Discussed that will review with Dr. Chance and call back.    Reviewed pt's request for CT calcium score with Dr. Chance who states it will not change anything for surgery clearance but is a good idea given her family history.    Called and spoke with pt.  Reviewed that Dr. Chance agrees to CT calcium score and she agree to be transferred to scheduling to arrange.  Also reviewed CT angio has been cancelled for 7/2/19 and she is now scheduled for OV with ETHAN Omalley on 7/5/19 at 9:30am in [place].      TINO Diaz 12:50 PM 7/1/2019    "

## 2019-07-02 ENCOUNTER — OFFICE VISIT (OUTPATIENT)
Dept: FAMILY MEDICINE | Facility: CLINIC | Age: 34
End: 2019-07-02
Payer: COMMERCIAL

## 2019-07-02 ENCOUNTER — HOSPITAL ENCOUNTER (OUTPATIENT)
Dept: CARDIOLOGY | Facility: CLINIC | Age: 34
Discharge: HOME | End: 2019-07-02
Attending: INTERNAL MEDICINE | Admitting: INTERNAL MEDICINE
Payer: COMMERCIAL

## 2019-07-02 VITALS
DIASTOLIC BLOOD PRESSURE: 64 MMHG | OXYGEN SATURATION: 96 % | HEART RATE: 82 BPM | HEIGHT: 67 IN | BODY MASS INDEX: 32.02 KG/M2 | WEIGHT: 204 LBS | TEMPERATURE: 99.4 F | SYSTOLIC BLOOD PRESSURE: 100 MMHG

## 2019-07-02 DIAGNOSIS — Z01.818 PREOP GENERAL PHYSICAL EXAM: Primary | ICD-10-CM

## 2019-07-02 DIAGNOSIS — D41.4 BLADDER POLYP: ICD-10-CM

## 2019-07-02 DIAGNOSIS — F32.5 MAJOR DEPRESSION IN COMPLETE REMISSION (H): ICD-10-CM

## 2019-07-02 DIAGNOSIS — Z13.6 SCREENING FOR CARDIOVASCULAR CONDITION: ICD-10-CM

## 2019-07-02 DIAGNOSIS — J45.40 MODERATE PERSISTENT ASTHMA WITHOUT COMPLICATION: ICD-10-CM

## 2019-07-02 LAB — HGB BLD-MCNC: 14.1 G/DL (ref 11.7–15.7)

## 2019-07-02 PROCEDURE — 75571 CT HRT W/O DYE W/CA TEST: CPT

## 2019-07-02 PROCEDURE — 36415 COLL VENOUS BLD VENIPUNCTURE: CPT | Performed by: FAMILY MEDICINE

## 2019-07-02 PROCEDURE — 99214 OFFICE O/P EST MOD 30 MIN: CPT | Performed by: FAMILY MEDICINE

## 2019-07-02 PROCEDURE — 85018 HEMOGLOBIN: CPT | Performed by: FAMILY MEDICINE

## 2019-07-02 PROCEDURE — 75571 CT HRT W/O DYE W/CA TEST: CPT | Performed by: INTERNAL MEDICINE

## 2019-07-02 RX ORDER — CYCLOBENZAPRINE HCL 10 MG
10 TABLET ORAL DAILY PRN
COMMUNITY

## 2019-07-02 ASSESSMENT — PATIENT HEALTH QUESTIONNAIRE - PHQ9
5. POOR APPETITE OR OVEREATING: SEVERAL DAYS
SUM OF ALL RESPONSES TO PHQ QUESTIONS 1-9: 3

## 2019-07-02 ASSESSMENT — ANXIETY QUESTIONNAIRES
3. WORRYING TOO MUCH ABOUT DIFFERENT THINGS: SEVERAL DAYS
6. BECOMING EASILY ANNOYED OR IRRITABLE: SEVERAL DAYS
2. NOT BEING ABLE TO STOP OR CONTROL WORRYING: NOT AT ALL
5. BEING SO RESTLESS THAT IT IS HARD TO SIT STILL: SEVERAL DAYS
1. FEELING NERVOUS, ANXIOUS, OR ON EDGE: SEVERAL DAYS
GAD7 TOTAL SCORE: 5
7. FEELING AFRAID AS IF SOMETHING AWFUL MIGHT HAPPEN: NOT AT ALL

## 2019-07-02 ASSESSMENT — MIFFLIN-ST. JEOR: SCORE: 1653.09

## 2019-07-02 NOTE — Clinical Note
Please abstract the following data from this visit with this patient into the appropriate field in Epic:Pap smear done on this date: 8/2018 (approximately), by this group: [facility], results were normal.

## 2019-07-02 NOTE — PROGRESS NOTES
[facility] [place]  [address] [address]  [phone]  Dept: [phone]    PRE-OP EVALUATION:  Today's date: 2019    Leisa Leong (: 1985) presents for pre-operative evaluation assessment as requested by Dr. Bernstein.  She requires evaluation and anesthesia risk assessment prior to undergoing surgery/procedure for treatment of bladder polyp removal suregry    Proposed Surgery/ Procedure: Cystoscopy  Date of Surgery/ Procedure: 19  Time of Surgery/ Procedure: 7:30am  Hospital/Surgical Facility: [facility]    Primary Physician: Nubia Gregorio  Type of Anesthesia Anticipated: to be determined    Patient has a Health Care Directive or Living Will:  NO    1. NO - Do you have a history of heart attack, stroke, stent, bypass or surgery on an artery in the head, neck, heart or legs?  2. YES - Do you ever have any pain or discomfort in your chest?  3. NO - Do you have a history of  Heart Failure?  4. NO - Are you troubled by shortness of breath when: walking on the level, up a slight hill or at night?  5. NO - Do you currently have a cold, bronchitis or other respiratory infection?  6. NO - Do you have a cough, shortness of breath or wheezing?  7. NO - Do you sometimes get pains in the calves of your legs when you walk?  8. YES - Do you or anyone in your family have previous history of blood clots?  9. NO - Do you or does anyone in your family have a serious bleeding problem such as prolonged bleeding following surgeries or cuts?  10. YES - Have you ever had problems with anemia or been told to take iron pills?  11. NO - Have you had any abnormal blood loss such as black, tarry or bloody stools, or abnormal vaginal bleeding?  12. YES - Have you ever had a blood transfusion?  13. NO - Have you or any of your relatives ever had problems with anesthesia?  14. NO - Do you have sleep apnea, excessive snoring or daytime drowsiness?  15. NO - Do you have any  prosthetic heart valves?  16. NO - Do you have prosthetic joints?  17. NO - Is there any chance that you may be pregnant?      HPI:     HPI related to upcoming procedure:       See problem list for active medical problems.  Problems all longstanding and stable, except as noted/documented.  See ROS for pertinent symptoms related to these conditions.      MEDICAL HISTORY:     Patient Active Problem List    Diagnosis Date Noted     Moderate persistent asthma without complication 08/29/2017     Priority: Medium     S/P laparoscopy 04/25/2017     Priority: Medium     fetal bilateral club foot, antepartum 02/24/2015     Priority: Medium     Incompetent cervix 01/30/2015     Priority: Medium     Incompetent cervix in pregnancy 12/09/2014     Priority: Medium     Endometriosis---fibroids 06/11/2014     Priority: Medium     Right ovarian cyst 04/03/2013     Priority: Medium     Allergic rhinitis 03/21/2013     Priority: Medium     Cat, dust  Problem list name updated by automated process. Provider to review       IUFD (intrauterine fetal death) 01/09/2013     Priority: Medium     Health Care Home 12/26/2012     Priority: Medium     State Tier Level:  Tier 2  Status:  na  Care Coordinator:     See Letters for [facility] Care Plan           Living will, counseling/discussion 12/26/2012     Priority: Medium     Discussed advance care planning with patient; however, patient declined at this time. 12/26/2012          S/P myomectomies- multiple 4/19/2012 04/20/2012     Priority: Medium     Panic disorder without agoraphobia 09/27/2011     Priority: Medium     Major depression in complete remission (H) 10/05/2002     Priority: Medium     Other acne      Priority: Medium      Past Medical History:   Diagnosis Date     Anxiety and depression      Asthma     allergy induced     Endometriosis      Fam hx-cardiovas dis NEC     Father     Family history of diabetes mellitus     MGM     Fibroids      Headaches     randomly occurring, much  better recently     HPV in female      Hx of previous reproductive problem      Migraine      Motion sickness      Other acne      Palpitations      PONV (postoperative nausea and vomiting)     high anxiety when awakening from anesthesia.....hx in the distant past of difficult intubation - this occurred in ...     Seasonal allergies      Spider veins      STD (female)      Past Surgical History:   Procedure Laterality Date     C LASIK (EMP) W OPT MYOPIA -3.12  2009    bilaterally     CERCLAGE CERVICAL N/A 12/10/2014    Procedure: CERCLAGE CERVICAL;  Surgeon: Marc Stapleton MD;  Location: [facility]     CERCLAGE CERVICAL N/A 2015    Procedure: CERCLAGE CERVICAL;  Surgeon: Mj Blum MD;  [facility]      SECTION N/A 3/4/2015    Procedure:  SECTION;  Surgeon: Elle Howell MD;  [facility]     CYSTECTOMY OVARIAN BENIGN  2013    Procedure: CYSTECTOMY OVARIAN BENIGN;;  Surgeon: Marc Stapleton MD;  Location:  OR     CYSTOSCOPY  2019     DILATION AND CURETTAGE       DILATION AND CURETTAGE SUCTION  1/10/2013    Procedure: DILATION AND CURETTAGE SUCTION;  Suction D & C with Intraoperative Ultrasound;  Surgeon: Nubia Gregorio MD;  Location: [facility]     DILATION AND CURETTAGE, OPERATIVE HYSTEROSCOPY WITH MORCELLATOR, COMBINED N/A 2016    Procedure: COMBINED DILATION AND CURETTAGE, OPERATIVE HYSTEROSCOPY WITH MORCELLATOR;  Surgeon: Nubia Gregorio MD;  Location: [facility]     EXCISE LESION PERINEAL  2014    Procedure: EXCISE LESION PERINEAL;  Surgeon: Marc Stapleton MD;  Location: [facility]     GYN SURGERY      Laparotomy     HC CREATE EARDRUM OPENING,GEN ANESTH  infant    P.E. Tubes     HC HYSTEROSCOPY, SURGICAL; W/ REMOVAL LEIOMYOMATA  2007    4 cm fibroid     HC REMOVAL OF TONSILS,12+ Y/O  3/2008    Tonsils 12+y.o.     HC TOOTH EXTRACTION W/FORCEP  3/2007    wisdom teeth     HYSTEROSCOPY,REMOVE MYOMA  2008     LAPAROSCOPIC CYSTECTOMY OVARIAN  (BENIGN) Left 4/25/2017    Procedure: LAPAROSCOPIC CYSTECTOMY OVARIAN (BENIGN);;  Surgeon: Nubia Gregorio MD;  Location:  OR     LAPAROSCOPIC LYSIS ADHESIONS N/A 8/9/2016    Procedure: LAPAROSCOPIC LYSIS ADHESIONS;  Surgeon: Nubia Gregorio MD;  Location:  [facility]     LAPAROSCOPIC TUBAL DYE STUDY  6/12/2014    Procedure: LAPAROSCOPIC TUBAL DYE STUDY;  Surgeon: Marc Stapleton MD;  Location: [facility]     LAPAROSCOPIC TUBAL DYE STUDY  8/9/2016    Procedure: LAPAROSCOPIC TUBAL DYE STUDY;  Surgeon: Nubia Gregorio MD;  Location: [facility]     LAPAROSCOPY DIAGNOSTIC (GYN) N/A 4/25/2017    Procedure: LAPAROSCOPY DIAGNOSTIC (GYN);;  Surgeon: Nubia Gregorio MD;  Location:  OR     [facility] BILATERAL Bilateral 2008     MYOMECTOMY UTERUS  4/4/2013    Procedure: MYOMECTOMY UTERUS;  PELVIC LAPAROTOMY, Multiple MYOMECTOMY, RIGHT OVARIAN CYSTECTOMY, placement of seprafilm;  Surgeon: Marc Stapleton MD;  Location:  OR     OPERATIVE HYSTEROSCOPY WITH MORCELLATOR  6/12/2014    Procedure: OPERATIVE HYSTEROSCOPY WITH MORCELLATOR (ALVARADO & NEPHEW);  Surgeon: Marc Stapleton MD;  Location: [facility]     OPERATIVE HYSTEROSCOPY WITH MORCELLATOR N/A 9/23/2016    Procedure: OPERATIVE HYSTEROSCOPY WITH MORCELLATOR (ALVARADO & NEPHEW);  Surgeon: Nubia Gregorio MD;  Location: [facility]     OPERATIVE HYSTEROSCOPY WITH MORCELLATOR N/A 4/25/2017    Procedure: OPERATIVE HYSTEROSCOPY WITH MORCELLATOR (ALVARADO & NEPHEW);  HYSTEROSCOPY DILATION AND CURATTAGE WITH ULTRASOUND GUIDANCE,  LAPAROSCOPIC LYSIS OF ADHESIONS, LAPAROSCOPIC BILATERAL OVARIAN CYSTECTOMY;  Surgeon: Nubia Gregorio MD;  Location:  OR     pending  2/21/2011    removal of uterine fibroids     REMOVE CERCLAGE N/A 3/4/2015    Procedure: REMOVE CERCLAGE;  Surgeon: Elle Howell MD;  [facility]: [facility]     wisdom teeth       wisdom teeth extraction       Current Outpatient Medications   Medication Sig Dispense Refill     Acetaminophen (TYLENOL PO) Take 1,000  "mg by mouth every 6 hours as needed Reported on 3/14/2017       albuterol (PROAIR HFA/PROVENTIL HFA/VENTOLIN HFA) 108 (90 BASE) MCG/ACT Inhaler Inhale 2 puffs into the lungs daily as needed for shortness of breath / dyspnea or wheezing       cetirizine (ZYRTEC) 10 MG tablet Take 10 mg by mouth At Bedtime       cyclobenzaprine (FLEXERIL) 10 MG tablet Take 10 mg by mouth as needed for muscle spasms       gabapentin (NEURONTIN) 300 MG capsule TAKE 1 CAPSULE BY MOUTH EVERYDAY AT BEDTIME  3     magnesium 250 MG tablet Take 1 tablet by mouth daily       montelukast (SINGULAIR) 10 MG tablet Take 1 tablet (10 mg) by mouth At Bedtime Due for appt before further refills. 30 tablet 0     multivitamin, therapeutic with minerals (MULTI-VITAMIN) TABS tablet Take 1 tablet by mouth daily       NEW MED SHATAVARI  HERBAL SUPPLEMENT FOR HORMONE BALANCE       rizatriptan (MAXALT-MLT) 5 MG ODT Place 5 mg under the tongue       VITAMIN D, CHOLECALCIFEROL, PO Take 1 tablet by mouth daily       fluticasone-salmeterol (ADVAIR-HFA) 115-21 MCG/ACT inhaler Inhale 2 puffs into the lungs 2 times daily as needed        IBUPROFEN PO Take 600-800 mg by mouth 3 times daily as needed for moderate pain       OTC products: None, except as noted above    Allergies   Allergen Reactions     Liquid Adhesive Rash     Morphine Sulfate Anxiety     Tape [Adhesive Tape] Rash     Vicodin [Hydrocodone-Acetaminophen] Other (See Comments)     insomnia     Latex      SENSITIVITY TO LATEX GLOVES - \"HANDS FEEL SWOLLEN AFTER USING\"      Latex Allergy: NO    Social History     Tobacco Use     Smoking status: Former Smoker     Packs/day: 0.25     Years: 13.00     Pack years: 3.25     Last attempt to quit: 2012     Years since quittin.8     Smokeless tobacco: Never Used     Tobacco comment: socially - 1 x per week maximum    Substance Use Topics     Alcohol use: Yes     Alcohol/week: 0.0 oz     Comment: social     History   Drug Use No       REVIEW OF SYSTEMS: " "  CONSTITUTIONAL: NEGATIVE for fever, chills, change in weight  ENT/MOUTH: NEGATIVE for ear, mouth and throat problems  RESP: NEGATIVE for significant cough or SOB  CV: NEGATIVE for chest pain, palpitations or peripheral edema    EXAM:   /64   Pulse 82   Temp 99.4  F (37.4  C) (Tympanic)   Ht 1.71 m (5' 7.32\")   Wt 92.5 kg (204 lb)   LMP 06/17/2019 (Exact Date)   SpO2 96%   BMI 31.65 kg/m    GENERAL APPEARANCE: healthy, alert and no distress  HENT: ear canals and TM's normal and nose and mouth without ulcers or lesions  RESP: lungs clear to auscultation - no rales, rhonchi or wheezes  CV: regular rate and rhythm, normal S1 S2, no S3 or S4 and no murmur, click or rub   ABDOMEN: soft, nontender, no HSM or masses and bowel sounds normal  NEURO: Normal strength and tone, sensory exam grossly normal, mentation intact and speech normal    DIAGNOSTICS:     Results for orders placed or performed in visit on 07/02/19   Hemoglobin   Result Value Ref Range    Hemoglobin 14.1 11.7 - 15.7 g/dL         Recent Labs   Lab Test 11/27/17  1817 03/09/15  0754  03/06/15  0640  03/04/15  0405   HGB 13.2 8.6*   < >  --    < > 11.1*    179   < >  --    < > 207   INR  --   --   --  1.27*  --  1.03    141   < >  --    < >  --    POTASSIUM 3.9 3.6   < >  --    < >  --    CR 0.70 0.54   < >  --    < >  --     < > = values in this interval not displayed.        IMPRESSION:   Reason for surgery/procedure:   Diagnosis/reason for consult:     ICD-10-CM    1. Preop general physical exam Z01.818 Hemoglobin   2. Major depression in complete remission (H) F32.5    3. Moderate persistent asthma without complication J45.40    4. Bladder polyp D41.4          The proposed surgical procedure is considered LOW risk.    REVISED CARDIAC RISK INDEX  The patient has the following serious cardiovascular risks for perioperative complications such as (MI, PE, VFib and 3  AV Block):  No serious cardiac risks  INTERPRETATION: 0 risks: " Class I (very low risk - 0.4% complication rate)    The patient has the following additional risks for perioperative complications:  No identified additional risks      ICD-10-CM    1. Preop general physical exam Z01.818 Hemoglobin   2. Major depression in complete remission (H) F32.5    3. Moderate persistent asthma without complication J45.40    4. Bladder polyp D41.4        RECOMMENDATIONS:     Advised to hold any NSAID or aspirin for 1 week prior to surgery.  Avoid any over-the-counter medications prior to surgery.  Can continue Tylenol as needed for headache.        APPROVAL GIVEN to proceed with proposed procedure, without further diagnostic evaluation       Signed Electronically by: Anderson Naylor MD    Copy of this evaluation report is provided to requesting physician.    [place] Preop Guidelines    Revised Cardiac Risk Index

## 2019-07-03 ASSESSMENT — ANXIETY QUESTIONNAIRES: GAD7 TOTAL SCORE: 5

## 2019-07-03 ASSESSMENT — ASTHMA QUESTIONNAIRES: ACT_TOTALSCORE: 22

## 2019-07-04 ENCOUNTER — MYC MEDICAL ADVICE (OUTPATIENT)
Dept: UROLOGY | Facility: CLINIC | Age: 34
End: 2019-07-04

## 2019-07-04 NOTE — PROGRESS NOTES
HISTORY OF PRESENT ILLNESS:       Leisa Leong is a 34-year-old female who recently met Dr. Maximo Alves. She returns today for cardiac clearance for an upcoming bladder surgery and to review her CT caclcium score.    Since last summer, she has had intermittent palpitations.  A Ziopatch monitor was placed through [facility], which shows PACs and short runs of SVT.  Her magnesium, thyroid levels and electrolyte panel are normal.  She has a history of asthma, but has not noted any tachycardias with her inhaler therapy.      She has a father who  several months ago from congestive heart failure due to pulmonary hypertension.  He had atrial fibrillation, ventricular tachycardia and a PFO that was closed. He apparently had hypercoagulability with MTHFR abnormality but unclear to me what type. She is requesting to be tested today.     Her father did have a sudden death, but had chronic thromboembolic pulmonary hypertension managed by Dr. Vasques.      This patient's daughter apparently was born with a PFO which was reportedly closed with medical therapy as a baby.      She describes, also, different types of chest discomfort.  There is a pressure that occurs on the right side of her chest that can sometimes be associated with nausea.  This is typically happening without exertion and most commonly occurs with stress.  She occasionally has a sharp discomfort in the center of her chest when she is having the palpitations.  She has no exertional chest discomfort at this time.  She is not being awakened with chest discomfort.      She has significant dyslipidemia.  Labs were checked through [facility] in April showing a total cholesterol of 244, HDL 48, , triglyceride 294.  She is trying lifestyle modification.  She states at that time she was under stress from the loss of her father and was eating more carbohydrates than usual.  She stopped caffeine intake when she wore her Ziopatch monitor.      She was sent for a  stress echocardiogram.  Her Tomlin score was intermediate.  The stress EKG was nondiagnostic due to preexisting EKG abnormalities.  This appeared to be a normal stress echo; however, it was indeterminate, and a CT angiogram, stress nuclear study or stress MRI was suggested.  Unfortunately, [facility] is not covering her stress echo and denied our request for CTA so the patient pursued a coronary calcium score.  Echocardiography on the stress echo noted a trileaflet aortic valve.  On her full echo, the reader mentions the aortic valve was not well visualized, and there could be some nodular sclerosis, which would be atypical for her age.  Otherwise, her test was unremarkable.     CT coronary calcium score show a score of 0.66 in the circumflex; none in the other vessels.  Soft tissue exam notes calcified hilar lymph nodes.    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     I do not hear a murmur today.  Her exam is otherwise unremarkable. She continues to have much stress as her  was transferred to [place] and has been living there since February. She is selling the house here and moving to [place] in August. She has a 4 year old daughter and a mother who just had surgery and is living with them as well as recent death of her father.       IMPRESSION AND PLAN:     1.  Palpitations, likely from PACs and SVT.  All of her chemistries are normal.  I have reassured her that these are likely benign.  She has had no syncope or presyncope.     2.  History of asthma.     3.  Family history of thromboembolic disease-associated pulmonary hypertension.  Her dad had sudden death from this. She is requesting a test for MTHFR however given [employer] lack of coverage recently, I have told her she should make sure this is covered before having it checked.    4.  Chest discomfort, unclear cause.  Her stress EKG was indeterminant, her [facility]  score was indeterminate, and we suggest further workup for ischemic disease but BCBS denied    5. CORONARY ARTERY calcification in circumflex of 0.66  -I doubt her chest pain is cardiac at this point  -add ASA 81mg daily or every other day after bladder surgery  -add statins-see below  -she is cleared to proceed with her bladder surgery with a low cardiovascular risk. This has been reviewed with Dr. Chance.    7.  Dyslipidemia with coronary artery calcification in circumflex of 0.66  -mother had anaphylaxis on Lovastatin  -add Crestor 10mg daily  -lipid check and ALT check in one month prior to her move-I will call her with those results  -call if any side effects  -goal  or below    8. Calcified hilar lymph nodes  -follow up at PMD    9. Bladder papillary tumor-soon to undergo surgical intervention    It has been a pleasure seeing Leisa Leong in follow up today  Greater than 50% of this 45 minute visit was spent in counseling/collaboration    Jenny Courtney, MSN, APRN-BC, CNP  Cardiology    Orders Placed This Encounter   Procedures     Lipid Profile     ALT     Orders Placed This Encounter   Medications     rosuvastatin (CRESTOR) 10 MG tablet     Sig: Take 1 tablet (10 mg) by mouth daily     Dispense:  90 tablet     Refill:  3     aspirin (ASA) 81 MG EC tablet     Sig: Every other day     There are no discontinued medications.      Encounter Diagnosis   Name Primary?     Dyslipidemia Yes       CURRENT MEDICATIONS:  Current Outpatient Medications   Medication Sig Dispense Refill     Acetaminophen (TYLENOL PO) Take 1,000 mg by mouth every 6 hours as needed Reported on 3/14/2017       albuterol (PROAIR HFA/PROVENTIL HFA/VENTOLIN HFA) 108 (90 BASE) MCG/ACT Inhaler Inhale 2 puffs into the lungs daily as needed for shortness of breath / dyspnea or wheezing       aspirin (ASA) 81 MG EC tablet Every other day       cetirizine (ZYRTEC) 10 MG tablet Take 10 mg by mouth At Bedtime       cyclobenzaprine  "(FLEXERIL) 10 MG tablet Take 10 mg by mouth as needed for muscle spasms       fluticasone-salmeterol (ADVAIR-HFA) 115-21 MCG/ACT inhaler Inhale 2 puffs into the lungs 2 times daily as needed        gabapentin (NEURONTIN) 300 MG capsule TAKE 1 CAPSULE BY MOUTH EVERYDAY AT BEDTIME  3     montelukast (SINGULAIR) 10 MG tablet Take 1 tablet (10 mg) by mouth At Bedtime Due for appt before further refills. 30 tablet 0     rizatriptan (MAXALT-MLT) 5 MG ODT Place 5 mg under the tongue       rosuvastatin (CRESTOR) 10 MG tablet Take 1 tablet (10 mg) by mouth daily 90 tablet 3     IBUPROFEN PO Take 600-800 mg by mouth 3 times daily as needed for moderate pain       magnesium 250 MG tablet Take 1 tablet by mouth daily       multivitamin, therapeutic with minerals (MULTI-VITAMIN) TABS tablet Take 1 tablet by mouth daily       NEW MED SHATAVARI  HERBAL SUPPLEMENT FOR HORMONE BALANCE       VITAMIN D, CHOLECALCIFEROL, PO Take 1 tablet by mouth daily         ALLERGIES     Allergies   Allergen Reactions     Liquid Adhesive Rash     Morphine Sulfate Anxiety     Tape [Adhesive Tape] Rash     Vicodin [Hydrocodone-Acetaminophen] Other (See Comments)     insomnia     Latex      SENSITIVITY TO LATEX GLOVES - \"HANDS FEEL SWOLLEN AFTER USING\"       PAST MEDICAL HISTORY:  Past Medical History:   Diagnosis Date     Anxiety and depression      Asthma     allergy induced     Endometriosis      Fam hx-cardiovas dis NEC     Father     Family history of diabetes mellitus     MGM     Fibroids      Headaches     randomly occurring, much better recently     HPV in female      Hx of previous reproductive problem      Migraine      Motion sickness      Other acne      Palpitations      PONV (postoperative nausea and vomiting)     high anxiety when awakening from anesthesia.....hx in the distant past of difficult intubation - this occurred in 2008...     Seasonal allergies      Spider veins      STD (female)        PAST SURGICAL HISTORY:  Past Surgical " History:   Procedure Laterality Date     C LASIK (EMP) W OPT MYOPIA -3.12  2009    bilaterally     CERCLAGE CERVICAL N/A 12/10/2014    Procedure: CERCLAGE CERVICAL;  Surgeon: Marc Stapleton MD;  Location: [facility]     CERCLAGE CERVICAL N/A 2015    Procedure: CERCLAGE CERVICAL;  Surgeon: Mj Blum MD;  [facility]: [facility]      SECTION N/A 3/4/2015    Procedure:  SECTION;  Surgeon: Elle Howell MD;  [facility]: [facility]     CYSTECTOMY OVARIAN BENIGN  2013    Procedure: CYSTECTOMY OVARIAN BENIGN;;  Surgeon: Marc Stapleton MD;  Location:  OR     CYSTOSCOPY  2019     DILATION AND CURETTAGE       DILATION AND CURETTAGE SUCTION  1/10/2013    Procedure: DILATION AND CURETTAGE SUCTION;  Suction D & C with Intraoperative Ultrasound;  Surgeon: Nubia Gregorio MD;  Location:  OR     DILATION AND CURETTAGE, OPERATIVE HYSTEROSCOPY WITH MORCELLATOR, COMBINED N/A 2016    Procedure: COMBINED DILATION AND CURETTAGE, OPERATIVE HYSTEROSCOPY WITH MORCELLATOR;  Surgeon: Nubia Gregorio MD;  Location: [facility]     EXCISE LESION PERINEAL  2014    Procedure: EXCISE LESION PERINEAL;  Surgeon: Marc Stapleton MD;  Location: [facility]     GYN SURGERY      Laparotomy     HC CREATE EARDRUM OPENING,GEN ANESTH  infant    P.E. Tubes     HC HYSTEROSCOPY, SURGICAL; W/ REMOVAL LEIOMYOMATA  2007    4 cm fibroid     HC REMOVAL OF TONSILS,12+ Y/O  3/2008    Tonsils 10+y.o.     HC TOOTH EXTRACTION W/FORCEP  3/2007    wisdom teeth     HYSTEROSCOPY,REMOVE MYOMA  2008     LAPAROSCOPIC CYSTECTOMY OVARIAN (BENIGN) Left 2017    Procedure: LAPAROSCOPIC CYSTECTOMY OVARIAN (BENIGN);;  Surgeon: Nubia Gregorio MD;  Location:  OR     LAPAROSCOPIC LYSIS ADHESIONS N/A 2016    Procedure: LAPAROSCOPIC LYSIS ADHESIONS;  Surgeon: Nubia Gregorio MD;  Location: [facility]     LAPAROSCOPIC TUBAL DYE STUDY  2014    Procedure: LAPAROSCOPIC TUBAL DYE STUDY;  Surgeon: Marc Stapleton MD;   [facility]     LAPAROSCOPIC TUBAL DYE STUDY  8/9/2016    Procedure: LAPAROSCOPIC TUBAL DYE STUDY;  Surgeon: Nubia Gregorio MD;  Location: [facility]     LAPAROSCOPY DIAGNOSTIC (GYN) N/A 4/25/2017    Procedure: LAPAROSCOPY DIAGNOSTIC (GYN);;  Surgeon: Nubia Gregorio MD;  Location:  OR     LASIK BILATERAL Bilateral 2008     MYOMECTOMY UTERUS  4/4/2013    Procedure: MYOMECTOMY UTERUS;  PELVIC LAPAROTOMY, Multiple MYOMECTOMY, RIGHT OVARIAN CYSTECTOMY, placement of seprafilm;  Surgeon: Marc Stapleton MD;  Location:  OR     OPERATIVE HYSTEROSCOPY WITH MORCELLATOR  6/12/2014    Procedure: OPERATIVE HYSTEROSCOPY WITH MORCELLATOR (ALVARADO & NEPHEW);  Surgeon: Marc Stapleton MD;  [facility]     OPERATIVE HYSTEROSCOPY WITH MORCELLATOR N/A 9/23/2016    Procedure: OPERATIVE HYSTEROSCOPY WITH MORCELLATOR (ALVARADO & NEPHEW);  Surgeon: Nubia Gregorio MD;  [facility]     OPERATIVE HYSTEROSCOPY WITH MORCELLATOR N/A 4/25/2017    Procedure: OPERATIVE HYSTEROSCOPY WITH MORCELLATOR (ALVARADO & NEPHEW);  HYSTEROSCOPY DILATION AND CURATTAGE WITH ULTRASOUND GUIDANCE,  LAPAROSCOPIC LYSIS OF ADHESIONS, LAPAROSCOPIC BILATERAL OVARIAN CYSTECTOMY;  Surgeon: Nubia Gregorio MD;  Location:  OR     pending  2/21/2011    removal of uterine fibroids     REMOVE CERCLAGE N/A 3/4/2015    Procedure: REMOVE CERCLAGE;  Surgeon: Elle Howell MD;  Location:  L[facility]D     wisdom teeth       wisdom teeth extraction         FAMILY HISTORY:  Family History   Problem Relation Age of Onset     Depression Mother      Hypertension Father      Depression Father      Cancer Father         skin     Diabetes Maternal Grandmother      Depression Maternal Grandfather      Depression Paternal Grandmother      C.A.D. No family hx of      Breast Cancer No family hx of      Cancer - colorectal No family hx of        SOCIAL HISTORY:  Social History     Socioeconomic History     Marital status:      Spouse name: None      Number of children: 0     Years of education: 18     Highest education level: None   Occupational History     Employer: [employer]     Employer: NONE    Social Needs     Financial resource strain: None     Food insecurity:     Worry: None     Inability: None     Transportation needs:     Medical: None     Non-medical: None   Tobacco Use     Smoking status: Former Smoker     Packs/day: 0.25     Years: 13.00     Pack years: 3.25     Last attempt to quit: 2012     Years since quittin.8     Smokeless tobacco: Never Used     Tobacco comment: socially - 1 x per week maximum    Substance and Sexual Activity     Alcohol use: Yes     Alcohol/week: 0.0 oz     Comment: social     Drug use: No     Sexual activity: Yes     Partners: Male     Birth control/protection: Condom, Pill   Lifestyle     Physical activity:     Days per week: None     Minutes per session: None     Stress: None   Relationships     Social connections:     Talks on phone: None     Gets together: None     Attends [employer]ism service: None     Active member of club or organization: None     Attends meetings of clubs or organizations: None     Relationship status: None     Intimate partner violence:     Fear of current or ex partner: None     Emotionally abused: None     Physically abused: None     Forced sexual activity: None   Other Topics Concern      Service No     Blood Transfusions No     Comment: was denied ability to give blood/ 2002     Caffeine Concern Not Asked     Occupational Exposure Not Asked     Hobby Hazards No     Sleep Concern No     Stress Concern No     Weight Concern Yes     Comment: gained on depo     Special Diet No     Back Care Not Asked     Exercise Yes     Bike Helmet Not Asked     Seat Belt Yes     Self-Exams Yes     Parent/sibling w/ CABG, MI or angioplasty before 65F 55M? Not Asked   Social History Narrative     None       Review of Systems:  Skin:  Negative       Eyes:  Negative glasses    ENT:  Negative     "  Respiratory:  Negative       Cardiovascular:    chest pain;Positive for still gets nausea with CP. thinks CP related to stress  Gastroenterology: Positive for nausea    Genitourinary:  Negative for      Musculoskeletal:  Positive for back pain;neck pain    Neurologic:  Positive for headaches    Psychiatric:  Negative for      Heme/Lymph/Imm:  Positive for allergies    Endocrine:  Negative for        Physical Exam:  Vitals: /80   Pulse 80   Ht 1.727 m (5' 8\")   Wt 93.4 kg (206 lb)   LMP 06/17/2019 (Exact Date)   BMI 31.32 kg/m      Constitutional:  cooperative, alert and oriented, well developed, well nourished, in no acute distress overweight      Skin:  warm and dry to the touch          Head:  normocephalic        Eyes:  pupils equal and round        Lymph:      ENT:  no pallor or cyanosis, dentition good        Neck:  carotid pulses are full and equal bilaterally, JVP normal, no carotid bruit        Respiratory:  normal breath sounds, clear to auscultation, normal A-P diameter, normal symmetry, normal respiratory excursion, no use of accessory muscles         Cardiac: regular rhythm, normal S1/S2, no S3 or S4, apical impulse not displaced, no murmurs, gallops or rubs                pulses full and equal                                        GI:  not assessed this visit        Extremities and Muscular Skeletal:  no deformities, clubbing, cyanosis, erythema observed;no edema              Neurological:  no gross motor deficits;affect appropriate        Psych:  Alert and Oriented x 3;affect appropriate, oriented to time, person and place Anxious    Recent Lab Results:  LIPID RESULTS:  No results found for: CHOL, HDL, LDL, TRIG, CHOLHDLRATIO    LIVER ENZYME RESULTS:  Lab Results   Component Value Date    AST 22 03/09/2015    ALT 38 03/09/2015       CBC RESULTS:  Lab Results   Component Value Date    WBC 8.2 11/27/2017    RBC 4.16 11/27/2017    HGB 14.1 07/02/2019    HCT 38.3 11/27/2017    MCV 92 " 11/27/2017    MCH 31.7 11/27/2017    MCHC 34.5 11/27/2017    RDW 12.2 11/27/2017     11/27/2017       BMP RESULTS:  Lab Results   Component Value Date     11/27/2017    POTASSIUM 3.9 11/27/2017    CHLORIDE 106 11/27/2017    CO2 21 11/27/2017    ANIONGAP 12 11/27/2017    GLC 88 11/27/2017    BUN 19 11/27/2017    CR 0.70 11/27/2017    GFRESTIMATED >90 11/27/2017    GFRESTBLACK >90 11/27/2017    MADAI 9.4 11/27/2017        A1C RESULTS:  Lab Results   Component Value Date    A1C 5.2 11/03/2010       INR RESULTS:  Lab Results   Component Value Date    INR 1.27 (H) 03/06/2015    INR 1.03 03/04/2015           CC  No referring provider defined for this encounter.

## 2019-07-05 ENCOUNTER — OFFICE VISIT (OUTPATIENT)
Dept: CARDIOLOGY | Facility: CLINIC | Age: 34
End: 2019-07-05
Payer: COMMERCIAL

## 2019-07-05 ENCOUNTER — TELEPHONE (OUTPATIENT)
Dept: UROLOGY | Facility: CLINIC | Age: 34
End: 2019-07-05

## 2019-07-05 VITALS
BODY MASS INDEX: 31.22 KG/M2 | SYSTOLIC BLOOD PRESSURE: 115 MMHG | DIASTOLIC BLOOD PRESSURE: 80 MMHG | WEIGHT: 206 LBS | HEART RATE: 80 BPM | HEIGHT: 68 IN

## 2019-07-05 DIAGNOSIS — E78.5 DYSLIPIDEMIA: ICD-10-CM

## 2019-07-05 DIAGNOSIS — R07.89 CHEST TIGHTNESS OR PRESSURE: Primary | ICD-10-CM

## 2019-07-05 PROCEDURE — 99215 OFFICE O/P EST HI 40 MIN: CPT | Performed by: NURSE PRACTITIONER

## 2019-07-05 RX ORDER — ROSUVASTATIN CALCIUM 10 MG/1
10 TABLET, COATED ORAL DAILY
Qty: 90 TABLET | Refills: 3 | Status: SHIPPED | OUTPATIENT
Start: 2019-07-05 | End: 2019-08-07

## 2019-07-05 ASSESSMENT — MIFFLIN-ST. JEOR: SCORE: 1672.91

## 2019-07-05 NOTE — TELEPHONE ENCOUNTER
Pt calling to ask billing questions.  I gave her the CINDY number and she said she already has that number.  Pt will call [facility] hospital and talk to them there.  Also..... She had some PO questions.  She asked if she will have PO bleeding.  I explained everyone is different.  More active you are the more likely she is to see blood.  I advised her to keep activity to a minium for 7-10 days.  Pt will call us if needed.  ALE Grande CMA

## 2019-07-05 NOTE — LETTER
2019    Nubia Gregorio MD  [facility] [address]  [facility] [address]    RE: Leisa Leong       Dear Colleague,    I had the pleasure of seeing Leisa Leong in the [facility].    HISTORY OF PRESENT ILLNESS:       Leisa Leong is a 34-year-old female who recently met Dr. Maximo Alves. She returns today for cardiac clearance for an upcoming bladder surgery and to review her CT caclcium score.    Since last summer, she has had intermittent palpitations.  A Ziopatch monitor was placed through [facility], which shows PACs and short runs of SVT.  Her magnesium, thyroid levels and electrolyte panel are normal.  She has a history of asthma, but has not noted any tachycardias with her inhaler therapy.      She has a father who  several months ago from congestive heart failure due to pulmonary hypertension.  He had atrial fibrillation, ventricular tachycardia and a PFO that was closed. He apparently had hypercoagulability with MTHFR abnormality but unclear to me what type. She is requesting to be tested today.     Her father did have a sudden death, but had chronic thromboembolic pulmonary hypertension managed by Dr. Vasques.      This patient's daughter apparently was born with a PFO which was reportedly closed with medical therapy as a baby.      She describes, also, different types of chest discomfort.  There is a pressure that occurs on the right side of her chest that can sometimes be associated with nausea.  This is typically happening without exertion and most commonly occurs with stress.  She occasionally has a sharp discomfort in the center of her chest when she is having the palpitations.  She has no exertional chest discomfort at this time.  She is not being awakened with chest discomfort.      She has significant dyslipidemia.  Labs were checked through [facility] in April showing a total cholesterol of 244, HDL 48, , triglyceride 294.   She is trying lifestyle modification.  She states at that time she was under stress from the loss of her father and was eating more carbohydrates than usual.  She stopped caffeine intake when she wore her Ziopatch monitor.      She was sent for a stress echocardiogram.  Her Tomlin score was intermediate.  The stress EKG was nondiagnostic due to preexisting EKG abnormalities.  This appeared to be a normal stress echo; however, it was indeterminate, and a CT angiogram, stress nuclear study or stress MRI was suggested.  Unfortunately, [employer] is not covering her stress echo and denied our request for CTA so the patient pursued a coronary calcium score.  Echocardiography on the stress echo noted a trileaflet aortic valve.  On her full echo, the reader mentions the aortic valve was not well visualized, and there could be some nodular sclerosis, which would be atypical for her age.  Otherwise, her test was unremarkable.     CT coronary calcium score show a score of 0.66 in the circumflex; none in the other vessels.  Soft tissue exam notes calcified hilar lymph nodes.    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     I do not hear a murmur today.  Her exam is otherwise unremarkable. She continues to have much stress as her  was transferred to [place] and has been living there since February. She is selling the house here and moving to [place] in August. She has a 4 year old daughter and a mother who just had surgery and is living with them as well as recent death of her father.       IMPRESSION AND PLAN:     1.  Palpitations, likely from PACs and SVT.  All of her chemistries are normal.  I have reassured her that these are likely benign.  She has had no syncope or presyncope.     2.  History of asthma.     3.  Family history of thromboembolic disease-associated pulmonary hypertension.  Her dad had sudden death from this.  She is requesting a test for MTHFR however given [employer]BS lack of coverage recently, I have told her she should make sure this is covered before having it checked.    4.  Chest discomfort, unclear cause.  Her stress EKG was indeterminant, her Tomlin score was indeterminate, and we suggest further workup for ischemic disease but [employer]BS denied    5. CORONARY ARTERY calcification in circumflex of 0.66  -I doubt her chest pain is cardiac at this point  -add ASA 81mg daily or every other day after bladder surgery  -add statins-see below  -she is cleared to proceed with her bladder surgery with a low cardiovascular risk. This has been reviewed with Dr. Chance.    7.  Dyslipidemia  with coronary artery calcification in circumflex of 0.66  -mother had anaphylaxis on Lovastatin  -add Crestor 10mg daily  -lipid check and ALT check in one month prior to her move-I will call her with those results  -call if any side effects  -goal  or below    8. Calcified hilar lymph nodes  -follow up at PMD    9. Bladder papillary tumor-soon to undergo surgical intervention    It has been a pleasure seeing Leisa Leong in follow up today  Greater than 50% of this 45 minute visit was spent in counseling/collaboration    Jenny Courtney, MSN, APRN-BC, CNP  Cardiology    Orders Placed This Encounter   Procedures     Lipid Profile     ALT     Orders Placed This Encounter   Medications     rosuvastatin (CRESTOR) 10 MG tablet     Sig: Take 1 tablet (10 mg) by mouth daily     Dispense:  90 tablet     Refill:  3     aspirin (ASA) 81 MG EC tablet     Sig: Every other day     There are no discontinued medications.      Encounter Diagnosis   Name Primary?     Dyslipidemia Yes       CURRENT MEDICATIONS:  Current Outpatient Medications   Medication Sig Dispense Refill     Acetaminophen (TYLENOL PO) Take 1,000 mg by mouth every 6 hours as needed Reported on 3/14/2017       albuterol (PROAIR HFA/PROVENTIL HFA/VENTOLIN HFA) 108 (90 BASE)  "MCG/ACT Inhaler Inhale 2 puffs into the lungs daily as needed for shortness of breath / dyspnea or wheezing       aspirin (ASA) 81 MG EC tablet Every other day       cetirizine (ZYRTEC) 10 MG tablet Take 10 mg by mouth At Bedtime       cyclobenzaprine (FLEXERIL) 10 MG tablet Take 10 mg by mouth as needed for muscle spasms       fluticasone-salmeterol (ADVAIR-HFA) 115-21 MCG/ACT inhaler Inhale 2 puffs into the lungs 2 times daily as needed        gabapentin (NEURONTIN) 300 MG capsule TAKE 1 CAPSULE BY MOUTH EVERYDAY AT BEDTIME  3     montelukast (SINGULAIR) 10 MG tablet Take 1 tablet (10 mg) by mouth At Bedtime Due for appt before further refills. 30 tablet 0     rizatriptan (MAXALT-MLT) 5 MG ODT Place 5 mg under the tongue       rosuvastatin (CRESTOR) 10 MG tablet Take 1 tablet (10 mg) by mouth daily 90 tablet 3     IBUPROFEN PO Take 600-800 mg by mouth 3 times daily as needed for moderate pain       magnesium 250 MG tablet Take 1 tablet by mouth daily       multivitamin, therapeutic with minerals (MULTI-VITAMIN) TABS tablet Take 1 tablet by mouth daily       NEW MED SHATAVARI  HERBAL SUPPLEMENT FOR HORMONE BALANCE       VITAMIN D, CHOLECALCIFEROL, PO Take 1 tablet by mouth daily         ALLERGIES     Allergies   Allergen Reactions     Liquid Adhesive Rash     Morphine Sulfate Anxiety     Tape [Adhesive Tape] Rash     Vicodin [Hydrocodone-Acetaminophen] Other (See Comments)     insomnia     Latex      SENSITIVITY TO LATEX GLOVES - \"HANDS FEEL SWOLLEN AFTER USING\"       PAST MEDICAL HISTORY:  Past Medical History:   Diagnosis Date     Anxiety and depression      Asthma     allergy induced     Endometriosis      Fam hx-cardiovas dis NEC     Father     Family history of diabetes mellitus     MGM     Fibroids      Headaches     randomly occurring, much better recently     HPV in female      Hx of previous reproductive problem      Migraine      Motion sickness      Other acne      Palpitations      PONV (postoperative " nausea and vomiting)     high anxiety when awakening from anesthesia.....hx in the distant past of difficult intubation - this occurred in ...     Seasonal allergies      Spider veins      STD (female)        PAST SURGICAL HISTORY:  Past Surgical History:   Procedure Laterality Date     C LASIK (EMP) W OPT MYOPIA -3.12  2009    bilaterally     CERCLAGE CERVICAL N/A 12/10/2014    Procedure: CERCLAGE CERVICAL;  Surgeon: Marc Stapleton MD;  Location: [facility]     CERCLAGE CERVICAL N/A 2015    Procedure: CERCLAGE CERVICAL;  Surgeon: Mj Blum MD;  Location: [facility]      SECTION N/A 3/4/2015    Procedure:  SECTION;  Surgeon: Elle Howell MD;  [facility]: [facility]     CYSTECTOMY OVARIAN BENIGN  2013    Procedure: CYSTECTOMY OVARIAN BENIGN;;  Surgeon: Marc Stapleton MD;  Location:  OR     CYSTOSCOPY  2019     DILATION AND CURETTAGE       DILATION AND CURETTAGE SUCTION  1/10/2013    Procedure: DILATION AND CURETTAGE SUCTION;  Suction D & C with Intraoperative Ultrasound;  Surgeon: Nubia Gregorio MD;  Location: [facility]     DILATION AND CURETTAGE, OPERATIVE HYSTEROSCOPY WITH MORCELLATOR, COMBINED N/A 2016    Procedure: COMBINED DILATION AND CURETTAGE, OPERATIVE HYSTEROSCOPY WITH MORCELLATOR;  Surgeon: Nubia Gregorio MD;  Location: [facility]     EXCISE LESION PERINEAL  2014    Procedure: EXCISE LESION PERINEAL;  Surgeon: Marc Stapleton MD;  Location: [facility]     GYN SURGERY      Laparotomy     HC CREATE EARDRUM OPENING,GEN ANESTH  infant    P.E. Tubes     HC HYSTEROSCOPY, SURGICAL; W/ REMOVAL LEIOMYOMATA  2007    4 cm fibroid     HC REMOVAL OF TONSILS,12+ Y/O  3/2008    Tonsils 10+y.o.     HC TOOTH EXTRACTION W/FORCEP  3/2007    wisdom teeth     HYSTEROSCOPY,REMOVE MYOMA  2008     LAPAROSCOPIC CYSTECTOMY OVARIAN (BENIGN) Left 2017    Procedure: LAPAROSCOPIC CYSTECTOMY OVARIAN (BENIGN);;  Surgeon: Nubia Gregorio MD;  Location: [facility]      LAPAROSCOPIC LYSIS ADHESIONS N/A 8/9/2016    Procedure: LAPAROSCOPIC LYSIS ADHESIONS;  Surgeon: Nubia Gregorio MD;  Location: [facility]     LAPAROSCOPIC TUBAL DYE STUDY  6/12/2014    Procedure: LAPAROSCOPIC TUBAL DYE STUDY;  Surgeon: Marc Stapleton MD;  Location: [facility]     LAPAROSCOPIC TUBAL DYE STUDY  8/9/2016    Procedure: LAPAROSCOPIC TUBAL DYE STUDY;  Surgeon: Nubia Gregorio MD;  Location: [facility]     LAPAROSCOPY DIAGNOSTIC (GYN) N/A 4/25/2017    Procedure: LAPAROSCOPY DIAGNOSTIC (GYN);;  Surgeon: Nubia Gregorio MD;  Location:  OR     LASIK BILATERAL Bilateral 2008     MYOMECTOMY UTERUS  4/4/2013    Procedure: MYOMECTOMY UTERUS;  PELVIC LAPAROTOMY, Multiple MYOMECTOMY, RIGHT OVARIAN CYSTECTOMY, placement of seprafilm;  Surgeon: Marc Stapleton MD;  Location:  OR     OPERATIVE HYSTEROSCOPY WITH MORCELLATOR  6/12/2014    Procedure: OPERATIVE HYSTEROSCOPY WITH MORCELLATOR (ALVARADO & NEPHEW);  Surgeon: Marc Stapleton MD;  Location: [facility]     OPERATIVE HYSTEROSCOPY WITH MORCELLATOR N/A 9/23/2016    Procedure: OPERATIVE HYSTEROSCOPY WITH MORCELLATOR (ALVARADO & NEPHEW);  Surgeon: Nubia Gregorio MD;  Location: [facility]     OPERATIVE HYSTEROSCOPY WITH MORCELLATOR N/A 4/25/2017    Procedure: OPERATIVE HYSTEROSCOPY WITH MORCELLATOR (ALVARADO & NEPHEW);  HYSTEROSCOPY DILATION AND CURATTAGE WITH ULTRASOUND GUIDANCE,  LAPAROSCOPIC LYSIS OF ADHESIONS, LAPAROSCOPIC BILATERAL OVARIAN CYSTECTOMY;  Surgeon: Nubia Gregorio MD;  Location:  OR     pending  2/21/2011    removal of uterine fibroids     REMOVE CERCLAGE N/A 3/4/2015    Procedure: REMOVE CERCLAGE;  Surgeon: Elle Howell MD;  Location:  L+D     wisdom teeth       wisdom teeth extraction         FAMILY HISTORY:  Family History   Problem Relation Age of Onset     Depression Mother      Hypertension Father      Depression Father      Cancer Father         skin     Diabetes Maternal Grandmother      Depression Maternal Grandfather       Depression Paternal Grandmother      RAMÓNALONNIE No family hx of      Breast Cancer No family hx of      Cancer - colorectal No family hx of        SOCIAL HISTORY:  Social History     Socioeconomic History     Marital status:      Spouse name: None     Number of children: 0     Years of education: 18     Highest education level: None   Occupational History     Employer: [employer]     Employer: NONE    Social Needs     Financial resource strain: None     Food insecurity:     Worry: None     Inability: None     Transportation needs:     Medical: None     Non-medical: None   Tobacco Use     Smoking status: Former Smoker     Packs/day: 0.25     Years: 13.00     Pack years: 3.25     Last attempt to quit: 2012     Years since quittin.8     Smokeless tobacco: Never Used     Tobacco comment: socially - 1 x per week maximum    Substance and Sexual Activity     Alcohol use: Yes     Alcohol/week: 0.0 oz     Comment: social     Drug use: No     Sexual activity: Yes     Partners: Male     Birth control/protection: Condom, Pill   Lifestyle     Physical activity:     Days per week: None     Minutes per session: None     Stress: None   Relationships     Social connections:     Talks on phone: None     Gets together: None     Attends L[employer] service: None     Active member of club or organization: None     Attends meetings of clubs or organizations: None     Relationship status: None     Intimate partner violence:     Fear of current or ex partner: None     Emotionally abused: None     Physically abused: None     Forced sexual activity: None   Other Topics Concern      Service No     Blood Transfusions No     Comment: was denied ability to give blood/ 2002     Caffeine Concern Not Asked     Occupational Exposure Not Asked     Hobby Hazards No     Sleep Concern No     Stress Concern No     Weight Concern Yes     Comment: gained on depo     Special Diet No     Back Care Not Asked     Exercise Yes     Bike  "Helmet Not Asked     Seat Belt Yes     Self-Exams Yes     Parent/sibling w/ CABG, MI or angioplasty before 65F 55M? Not Asked   Social History Narrative     None       Review of Systems:  Skin:  Negative       Eyes:  Negative glasses    ENT:  Negative      Respiratory:  Negative       Cardiovascular:    chest pain;Positive for still gets nausea with CP. thinks CP related to stress  Gastroenterology: Positive for nausea    Genitourinary:  Negative for      Musculoskeletal:  Positive for back pain;neck pain    Neurologic:  Positive for headaches    Psychiatric:  Negative for      Heme/Lymph/Imm:  Positive for allergies    Endocrine:  Negative for        Physical Exam:  Vitals: /80   Pulse 80   Ht 1.727 m (5' 8\")   Wt 93.4 kg (206 lb)   LMP 06/17/2019 (Exact Date)   BMI 31.32 kg/m       Constitutional:  cooperative, alert and oriented, well developed, well nourished, in no acute distress overweight      Skin:  warm and dry to the touch          Head:  normocephalic        Eyes:  pupils equal and round        Lymph:      ENT:  no pallor or cyanosis, dentition good        Neck:  carotid pulses are full and equal bilaterally, JVP normal, no carotid bruit        Respiratory:  normal breath sounds, clear to auscultation, normal A-P diameter, normal symmetry, normal respiratory excursion, no use of accessory muscles         Cardiac: regular rhythm, normal S1/S2, no S3 or S4, apical impulse not displaced, no murmurs, gallops or rubs                pulses full and equal                                        GI:  not assessed this visit        Extremities and Muscular Skeletal:  no deformities, clubbing, cyanosis, erythema observed;no edema              Neurological:  no gross motor deficits;affect appropriate        Psych:  Alert and Oriented x 3;affect appropriate, oriented to time, person and place Anxious    Recent Lab Results:  LIPID RESULTS:  No results found for: CHOL, HDL, LDL, TRIG, CHOLHDLRATIO    LIVER " ENZYME RESULTS:  Lab Results   Component Value Date    AST 22 03/09/2015    ALT 38 03/09/2015       CBC RESULTS:  Lab Results   Component Value Date    WBC 8.2 11/27/2017    RBC 4.16 11/27/2017    HGB 14.1 07/02/2019    HCT 38.3 11/27/2017    MCV 92 11/27/2017    MCH 31.7 11/27/2017    MCHC 34.5 11/27/2017    RDW 12.2 11/27/2017     11/27/2017       BMP RESULTS:  Lab Results   Component Value Date     11/27/2017    POTASSIUM 3.9 11/27/2017    CHLORIDE 106 11/27/2017    CO2 21 11/27/2017    ANIONGAP 12 11/27/2017    GLC 88 11/27/2017    BUN 19 11/27/2017    CR 0.70 11/27/2017    GFRESTIMATED >90 11/27/2017    GFRESTBLACK >90 11/27/2017    MADAI 9.4 11/27/2017        A1C RESULTS:  Lab Results   Component Value Date    A1C 5.2 11/03/2010       INR RESULTS:  Lab Results   Component Value Date    INR 1.27 (H) 03/06/2015    INR 1.03 03/04/2015           CC  No referring provider defined for this encounter.              Thank you for allowing me to participate in the care of your patient.    Sincerely,     KIM Tavarez CNP     [facility]

## 2019-07-05 NOTE — PATIENT INSTRUCTIONS
Start Crestor 10mg daily    Check lipid panel and liver panel on 8-7-fasting    You are cleared for your surgery with a low cardiovascular risk

## 2019-07-05 NOTE — TELEPHONE ENCOUNTER
[facility]    Phone Message    May a detailed message be left on voicemail: yes    Reason for Call: Other: pt is wondering if anything was submitted to her insurance for her upcoming procedure on 7/11, please call to give update     Action Taken: Message routed to:  [facility] ([facility] dairus

## 2019-07-11 ENCOUNTER — APPOINTMENT (OUTPATIENT)
Dept: GENERAL RADIOLOGY | Facility: CLINIC | Age: 34
End: 2019-07-11
Attending: UROLOGY
Payer: COMMERCIAL

## 2019-07-11 ENCOUNTER — ANESTHESIA (OUTPATIENT)
Dept: SURGERY | Facility: CLINIC | Age: 34
End: 2019-07-11
Payer: COMMERCIAL

## 2019-07-11 ENCOUNTER — HOSPITAL ENCOUNTER (OUTPATIENT)
Facility: CLINIC | Age: 34
Discharge: HOME | End: 2019-07-11
Attending: UROLOGY | Admitting: UROLOGY
Payer: COMMERCIAL

## 2019-07-11 ENCOUNTER — ANESTHESIA EVENT (OUTPATIENT)
Dept: SURGERY | Facility: CLINIC | Age: 34
End: 2019-07-11
Payer: COMMERCIAL

## 2019-07-11 VITALS
HEART RATE: 69 BPM | WEIGHT: 205.4 LBS | TEMPERATURE: 98.6 F | HEIGHT: 68 IN | RESPIRATION RATE: 14 BRPM | SYSTOLIC BLOOD PRESSURE: 98 MMHG | BODY MASS INDEX: 31.13 KG/M2 | DIASTOLIC BLOOD PRESSURE: 69 MMHG | OXYGEN SATURATION: 94 %

## 2019-07-11 DIAGNOSIS — C67.2 MALIGNANT NEOPLASM OF LATERAL WALL OF URINARY BLADDER (H): Primary | ICD-10-CM

## 2019-07-11 LAB — HCG UR QL: NEGATIVE

## 2019-07-11 PROCEDURE — 25500064 ZZH RX 255 OP 636: Performed by: UROLOGY

## 2019-07-11 PROCEDURE — 40000277 XR SURGERY CARM FLUORO LESS THAN 5 MIN W STILLS

## 2019-07-11 PROCEDURE — 25000128 H RX IP 250 OP 636: Performed by: UROLOGY

## 2019-07-11 PROCEDURE — C1758 CATHETER, URETERAL: HCPCS | Performed by: UROLOGY

## 2019-07-11 PROCEDURE — 52224 CYSTOSCOPY AND TREATMENT: CPT | Performed by: UROLOGY

## 2019-07-11 PROCEDURE — 36000050 ZZH SURGERY LEVEL 2 1ST 30 MIN: Performed by: UROLOGY

## 2019-07-11 PROCEDURE — 36000052 ZZH SURGERY LEVEL 2 EA 15 ADDTL MIN: Performed by: UROLOGY

## 2019-07-11 PROCEDURE — 40000170 ZZH STATISTIC PRE-PROCEDURE ASSESSMENT II: Performed by: UROLOGY

## 2019-07-11 PROCEDURE — 74420 UROGRAPHY RTRGR +-KUB: CPT | Mod: 26 | Performed by: UROLOGY

## 2019-07-11 PROCEDURE — 71000012 ZZH RECOVERY PHASE 1 LEVEL 1 FIRST HR: Performed by: UROLOGY

## 2019-07-11 PROCEDURE — 25000128 H RX IP 250 OP 636: Performed by: NURSE ANESTHETIST, CERTIFIED REGISTERED

## 2019-07-11 PROCEDURE — 71000013 ZZH RECOVERY PHASE 1 LEVEL 1 EA ADDTL HR: Performed by: UROLOGY

## 2019-07-11 PROCEDURE — 25000125 ZZHC RX 250: Performed by: UROLOGY

## 2019-07-11 PROCEDURE — 37000008 ZZH ANESTHESIA TECHNICAL FEE, 1ST 30 MIN: Performed by: UROLOGY

## 2019-07-11 PROCEDURE — 25800030 ZZH RX IP 258 OP 636: Performed by: NURSE ANESTHETIST, CERTIFIED REGISTERED

## 2019-07-11 PROCEDURE — 27210794 ZZH OR GENERAL SUPPLY STERILE: Performed by: UROLOGY

## 2019-07-11 PROCEDURE — 25000128 H RX IP 250 OP 636: Performed by: ANESTHESIOLOGY

## 2019-07-11 PROCEDURE — 88305 TISSUE EXAM BY PATHOLOGIST: CPT | Mod: 26 | Performed by: UROLOGY

## 2019-07-11 PROCEDURE — 81025 URINE PREGNANCY TEST: CPT | Performed by: UROLOGY

## 2019-07-11 PROCEDURE — 71000027 ZZH RECOVERY PHASE 2 EACH 15 MINS: Performed by: UROLOGY

## 2019-07-11 PROCEDURE — 37000009 ZZH ANESTHESIA TECHNICAL FEE, EACH ADDTL 15 MIN: Performed by: UROLOGY

## 2019-07-11 PROCEDURE — 25000125 ZZHC RX 250: Performed by: NURSE ANESTHETIST, CERTIFIED REGISTERED

## 2019-07-11 PROCEDURE — 88305 TISSUE EXAM BY PATHOLOGIST: CPT | Performed by: UROLOGY

## 2019-07-11 RX ORDER — DEXAMETHASONE SODIUM PHOSPHATE 4 MG/ML
INJECTION, SOLUTION INTRA-ARTICULAR; INTRALESIONAL; INTRAMUSCULAR; INTRAVENOUS; SOFT TISSUE PRN
Status: DISCONTINUED | OUTPATIENT
Start: 2019-07-11 | End: 2019-07-11

## 2019-07-11 RX ORDER — IOPAMIDOL 612 MG/ML
INJECTION, SOLUTION INTRAVASCULAR PRN
Status: DISCONTINUED | OUTPATIENT
Start: 2019-07-11 | End: 2019-07-11 | Stop reason: HOSPADM

## 2019-07-11 RX ORDER — ONDANSETRON 2 MG/ML
INJECTION INTRAMUSCULAR; INTRAVENOUS PRN
Status: DISCONTINUED | OUTPATIENT
Start: 2019-07-11 | End: 2019-07-11

## 2019-07-11 RX ORDER — LIDOCAINE HYDROCHLORIDE 20 MG/ML
INJECTION, SOLUTION INFILTRATION; PERINEURAL PRN
Status: DISCONTINUED | OUTPATIENT
Start: 2019-07-11 | End: 2019-07-11

## 2019-07-11 RX ORDER — SODIUM CHLORIDE, SODIUM LACTATE, POTASSIUM CHLORIDE, CALCIUM CHLORIDE 600; 310; 30; 20 MG/100ML; MG/100ML; MG/100ML; MG/100ML
INJECTION, SOLUTION INTRAVENOUS CONTINUOUS PRN
Status: DISCONTINUED | OUTPATIENT
Start: 2019-07-11 | End: 2019-07-11

## 2019-07-11 RX ORDER — CEFAZOLIN SODIUM 2 G/100ML
2 INJECTION, SOLUTION INTRAVENOUS
Status: COMPLETED | OUTPATIENT
Start: 2019-07-11 | End: 2019-07-11

## 2019-07-11 RX ORDER — ONDANSETRON 4 MG/1
4 TABLET, ORALLY DISINTEGRATING ORAL EVERY 30 MIN PRN
Status: DISCONTINUED | OUTPATIENT
Start: 2019-07-11 | End: 2019-07-11 | Stop reason: HOSPADM

## 2019-07-11 RX ORDER — ONDANSETRON 2 MG/ML
4 INJECTION INTRAMUSCULAR; INTRAVENOUS EVERY 30 MIN PRN
Status: DISCONTINUED | OUTPATIENT
Start: 2019-07-11 | End: 2019-07-11 | Stop reason: HOSPADM

## 2019-07-11 RX ORDER — CIPROFLOXACIN 250 MG/1
250 TABLET, FILM COATED ORAL 2 TIMES DAILY
Qty: 2 TABLET | Refills: 0 | Status: SHIPPED | OUTPATIENT
Start: 2019-07-11 | End: 2019-07-12

## 2019-07-11 RX ORDER — SODIUM CHLORIDE, SODIUM LACTATE, POTASSIUM CHLORIDE, CALCIUM CHLORIDE 600; 310; 30; 20 MG/100ML; MG/100ML; MG/100ML; MG/100ML
INJECTION, SOLUTION INTRAVENOUS CONTINUOUS
Status: DISCONTINUED | OUTPATIENT
Start: 2019-07-11 | End: 2019-07-11 | Stop reason: HOSPADM

## 2019-07-11 RX ORDER — FLUTICASONE PROPIONATE 50 MCG
1 SPRAY, SUSPENSION (ML) NASAL 2 TIMES DAILY PRN
COMMUNITY

## 2019-07-11 RX ORDER — MEPERIDINE HYDROCHLORIDE 25 MG/ML
12.5 INJECTION INTRAMUSCULAR; INTRAVENOUS; SUBCUTANEOUS
Status: DISCONTINUED | OUTPATIENT
Start: 2019-07-11 | End: 2019-07-11 | Stop reason: HOSPADM

## 2019-07-11 RX ORDER — NALOXONE HYDROCHLORIDE 0.4 MG/ML
.1-.4 INJECTION, SOLUTION INTRAMUSCULAR; INTRAVENOUS; SUBCUTANEOUS
Status: DISCONTINUED | OUTPATIENT
Start: 2019-07-11 | End: 2019-07-11 | Stop reason: HOSPADM

## 2019-07-11 RX ORDER — ACETAMINOPHEN 500 MG
500-1000 TABLET ORAL EVERY 6 HOURS PRN
COMMUNITY

## 2019-07-11 RX ORDER — KETOROLAC TROMETHAMINE 30 MG/ML
INJECTION, SOLUTION INTRAMUSCULAR; INTRAVENOUS PRN
Status: DISCONTINUED | OUTPATIENT
Start: 2019-07-11 | End: 2019-07-11

## 2019-07-11 RX ORDER — PROPOFOL 10 MG/ML
INJECTION, EMULSION INTRAVENOUS PRN
Status: DISCONTINUED | OUTPATIENT
Start: 2019-07-11 | End: 2019-07-11

## 2019-07-11 RX ORDER — PROPOFOL 10 MG/ML
INJECTION, EMULSION INTRAVENOUS CONTINUOUS PRN
Status: DISCONTINUED | OUTPATIENT
Start: 2019-07-11 | End: 2019-07-11

## 2019-07-11 RX ORDER — GABAPENTIN 300 MG/1
300 CAPSULE ORAL AT BEDTIME
COMMUNITY

## 2019-07-11 RX ORDER — HYDROMORPHONE HYDROCHLORIDE 1 MG/ML
.3-.5 INJECTION, SOLUTION INTRAMUSCULAR; INTRAVENOUS; SUBCUTANEOUS EVERY 10 MIN PRN
Status: DISCONTINUED | OUTPATIENT
Start: 2019-07-11 | End: 2019-07-11 | Stop reason: HOSPADM

## 2019-07-11 RX ORDER — FENTANYL CITRATE 50 UG/ML
INJECTION, SOLUTION INTRAMUSCULAR; INTRAVENOUS PRN
Status: DISCONTINUED | OUTPATIENT
Start: 2019-07-11 | End: 2019-07-11

## 2019-07-11 RX ORDER — CEFAZOLIN SODIUM 1 G/3ML
1 INJECTION, POWDER, FOR SOLUTION INTRAMUSCULAR; INTRAVENOUS SEE ADMIN INSTRUCTIONS
Status: DISCONTINUED | OUTPATIENT
Start: 2019-07-11 | End: 2019-07-11 | Stop reason: HOSPADM

## 2019-07-11 RX ORDER — FENTANYL CITRATE 50 UG/ML
25-50 INJECTION, SOLUTION INTRAMUSCULAR; INTRAVENOUS
Status: DISCONTINUED | OUTPATIENT
Start: 2019-07-11 | End: 2019-07-11 | Stop reason: HOSPADM

## 2019-07-11 RX ADMIN — DEXMEDETOMIDINE HYDROCHLORIDE 8 MCG: 100 INJECTION, SOLUTION INTRAVENOUS at 07:46

## 2019-07-11 RX ADMIN — FENTANYL CITRATE 25 MCG: 50 INJECTION, SOLUTION INTRAMUSCULAR; INTRAVENOUS at 08:05

## 2019-07-11 RX ADMIN — MIDAZOLAM 2 MG: 1 INJECTION INTRAMUSCULAR; INTRAVENOUS at 07:33

## 2019-07-11 RX ADMIN — CEFAZOLIN SODIUM 2 G: 2 INJECTION, SOLUTION INTRAVENOUS at 07:39

## 2019-07-11 RX ADMIN — PROPOFOL 150 MCG/KG/MIN: 10 INJECTION, EMULSION INTRAVENOUS at 07:34

## 2019-07-11 RX ADMIN — LIDOCAINE HYDROCHLORIDE 100 MG: 20 INJECTION, SOLUTION INFILTRATION; PERINEURAL at 07:34

## 2019-07-11 RX ADMIN — DEXAMETHASONE SODIUM PHOSPHATE 4 MG: 4 INJECTION, SOLUTION INTRA-ARTICULAR; INTRALESIONAL; INTRAMUSCULAR; INTRAVENOUS; SOFT TISSUE at 08:00

## 2019-07-11 RX ADMIN — DEXMEDETOMIDINE HYDROCHLORIDE 8 MCG: 100 INJECTION, SOLUTION INTRAVENOUS at 08:22

## 2019-07-11 RX ADMIN — FENTANYL CITRATE 50 MCG: 50 INJECTION, SOLUTION INTRAMUSCULAR; INTRAVENOUS at 07:34

## 2019-07-11 RX ADMIN — FENTANYL CITRATE 25 MCG: 50 INJECTION, SOLUTION INTRAMUSCULAR; INTRAVENOUS at 07:57

## 2019-07-11 RX ADMIN — HYDROMORPHONE HYDROCHLORIDE 0.5 MG: 1 INJECTION, SOLUTION INTRAMUSCULAR; INTRAVENOUS; SUBCUTANEOUS at 08:50

## 2019-07-11 RX ADMIN — DEXMEDETOMIDINE HYDROCHLORIDE 4 MCG: 100 INJECTION, SOLUTION INTRAVENOUS at 08:06

## 2019-07-11 RX ADMIN — KETOROLAC TROMETHAMINE 30 MG: 30 INJECTION, SOLUTION INTRAMUSCULAR at 08:15

## 2019-07-11 RX ADMIN — ONDANSETRON 4 MG: 2 INJECTION INTRAMUSCULAR; INTRAVENOUS at 08:00

## 2019-07-11 RX ADMIN — PROPOFOL 200 MG: 10 INJECTION, EMULSION INTRAVENOUS at 07:34

## 2019-07-11 RX ADMIN — SODIUM CHLORIDE, POTASSIUM CHLORIDE, SODIUM LACTATE AND CALCIUM CHLORIDE: 600; 310; 30; 20 INJECTION, SOLUTION INTRAVENOUS at 07:35

## 2019-07-11 ASSESSMENT — LIFESTYLE VARIABLES: TOBACCO_USE: 0

## 2019-07-11 ASSESSMENT — MIFFLIN-ST. JEOR: SCORE: 1670.19

## 2019-07-11 NOTE — OR NURSING
Pt requested Percocet, per Dr. Bernstein, pt has history of allergy. TORB received for Toradol 10 mg q 6 PRN, ten tabs. Pt accepting of recommendation.

## 2019-07-11 NOTE — PROGRESS NOTES
Admission medication history interview status for the 7/11/2019  admission is complete. See EPIC admission navigator for prior to admission medications     Medication history source reliability:Good    Medication history interview source(s):Patient    Medication history resources (including written lists, pill bottles, clinic record):None    Primary pharmacy.[facility]    Additional medication history information not noted on PTA med list :None    Time spent in this activity: 45 minutes    Prior to Admission medications    Medication Sig Last Dose Taking? Auth Provider   acetaminophen (TYLENOL) 500 MG tablet Take 500-1,000 mg by mouth every 6 hours as needed for mild pain 7/8/2019 at prn Yes Reported, Patient   albuterol (PROAIR HFA/PROVENTIL HFA/VENTOLIN HFA) 108 (90 BASE) MCG/ACT Inhaler Inhale 2 puffs into the lungs daily as needed for shortness of breath / dyspnea or wheezing more than a week at prn Yes Reported, Patient   cetirizine (ZYRTEC) 10 MG tablet Take 10 mg by mouth every morning  7/10/2019 at am Yes Reported, Patient   cyclobenzaprine (FLEXERIL) 10 MG tablet Take 10 mg by mouth daily as needed for muscle spasms  more than a week at prn Yes Reported, Patient   fluticasone (FLONASE) 50 MCG/ACT nasal spray Spray 1 spray into both nostrils 2 times daily as needed for rhinitis or allergies 7/10/2019 at PRN Yes Reported, Patient   fluticasone-salmeterol (ADVAIR-HFA) 115-21 MCG/ACT inhaler Inhale 2 puffs into the lungs daily  more than a week Yes Reported, Patient   gabapentin (NEURONTIN) 300 MG capsule Take 300 mg by mouth At Bedtime 7/10/2019 at 2230 Yes Reported, Patient   ibuprofen (ADVIL/MOTRIN) 200 MG capsule Take 600-800 mg by mouth 3 times daily as needed for moderate pain  more than a week at prn Yes Reported, Patient   magnesium 250 MG tablet Take 1 tablet by mouth daily 7/6/2019 Yes Reported, Patient   montelukast (SINGULAIR) 10 MG tablet Take 1 tablet (10 mg) by mouth At Bedtime Due for appt before  further refills. 7/10/2019 at 2230 Yes Juany Schwab PA-C   multivitamin, therapeutic with minerals (MULTI-VITAMIN) TABS tablet Take 1 tablet by mouth daily 7/6/2019 Yes Reported, Patient   OVER-THE-COUNTER Take 3-4 tablets by mouth daily SHATAVARI  HERBAL SUPPLEMENT FOR HORMONE BALANCE 7/6/2019 Yes Reported, Patient   rizatriptan (MAXALT-MLT) 5 MG ODT Place 5 mg under the tongue 2 times daily as needed for migraine  more than a month at prn Yes Reported, Patient   VITAMIN D, CHOLECALCIFEROL, PO Take 1 tablet by mouth daily 7/6/2019 Yes Reported, Patient   rosuvastatin (CRESTOR) 10 MG tablet Take 1 tablet (10 mg) by mouth daily NOT STARTED  Jenny Courtney, APRN CNP

## 2019-07-11 NOTE — ANESTHESIA CARE TRANSFER NOTE
Patient: Leisa Leong    Procedure(s):  CYSTOSCOPY, WITH BLADDER BIOPSY  FULGURATION OF THE BLADDER TUMOR  BILATERAL  RETROGRADE PYELOGRAM    Diagnosis: bladder tumor  Diagnosis Additional Information: No value filed.    Anesthesia Type:   General, LMA     Note:  Airway :Face Mask  Patient transferred to:PACU  Comments: Pt exhibits spont resps, all monitors and alarms on in pacu, report given to RN, vss.Handoff Report: Identifed the Patient, Identified the Reponsible Provider, Reviewed the pertinent medical history, Discussed the surgical course, Reviewed Intra-OP anesthesia mangement and issues during anesthesia, Set expectations for post-procedure period and Allowed opportunity for questions and acknowledgement of understanding      Vitals: (Last set prior to Anesthesia Care Transfer)    CRNA VITALS  7/11/2019 0754 - 7/11/2019 0828      7/11/2019             Pulse:  104    SpO2:  98 %    Resp Rate (set):  10                Electronically Signed By: KIM Escobar CRNA  July 11, 2019  8:28 AM

## 2019-07-11 NOTE — ANESTHESIA POSTPROCEDURE EVALUATION
Patient: Leisa Leong    Procedure(s):  CYSTOSCOPY, WITH BLADDER BIOPSY  FULGURATION OF THE BLADDER TUMOR  BILATERAL  RETROGRADE PYELOGRAM    Diagnosis:bladder tumor  Diagnosis Additional Information: No value filed.    Anesthesia Type:  General, LMA    Note:  Anesthesia Post Evaluation    Patient location during evaluation: PACU  Patient participation: Able to fully participate in evaluation  Level of consciousness: awake and alert  Pain management: adequate  Airway patency: patent  Cardiovascular status: acceptable and stable  Respiratory status: acceptable, spontaneous ventilation, unassisted and nonlabored ventilation  Hydration status: acceptable  PONV: none             Last vitals:  Vitals:    07/11/19 0945 07/11/19 1000 07/11/19 1100   BP: 109/69  98/69   Pulse: 61  69   Resp: 12 12 14   Temp: 36.9  C (98.4  F) 37  C (98.6  F)    SpO2: 93% 93% 94%         Electronically Signed By: Parviz Adkins MD  July 11, 2019  11:54 AM

## 2019-07-11 NOTE — OP NOTE
Procedure Date: 07/11/2019      PREOPERATIVE DIAGNOSIS:  Bladder tumor.      POSTOPERATIVE DIAGNOSIS:  Bladder tumor.      PROCEDURE:  Cystoscopy, bilateral retrograde pyelograms, biopsy and fulguration of small bladder tumor.      SURGEON:  Mandeep Bernstein MD.      ANESTHESIA:  General.      INDICATIONS:  Pelvic pain.  This is a very pleasant 34-year-old lady had presented to us with an evaluation for pelvic pain.  As part of the evaluation, I performed a cystoscopy.  On the left side of the bladder a small papillary tumor was identified.      DESCRIPTION OF PROCEDURE:  The patient was brought to the operative suite and after the induction of anesthesia, was placed in the dorsal lithotomy position with the genitalia prepped and draped in  customary fashion.      A timeout was then called.      A 24-Liberian cystoscope was then passed through the urethra, which was normal, into the bladder.  The entire bladder was carefully inspected.  The neoplasm, which was approximately 1 cm in total diameter, was identified lateral to the left ureteric orifice.  There was no evidence of surface encrustations.  No other lesions were seen in the bladder, which was carefully inspected with the 30 and 70-degree lenses.  There were no stones present.  The ureteric orifices were in normal position.  There was no significant trabeculation.  Using a cold cup biopsy, I then took 2 good biopsies from the area of the tumor with good pieces of tissue, and then entirely removed the tumor itself.  With the Bovie electrode, I then carefully coagulated the entire area.  No bleeding was observed following treatment.  Finally, after replacing the biopsy instrument with a bridge once again, using a 6-Liberian open-ended ureteral catheter, I performed retrograde pyelograms on both sides.  On both sides, the ureter was of normal caliber.  There was no evidence of hydronephrosis or filling defects in the urinary tract and no other remarkable features.  At  the completion of the procedure, the bladder was drained with the cystoscope.  The patient was taken to the recovery room having tolerated the procedure well.      CONCLUSION:  We will await the pathology report to see if this was a benign tumor or whether it was a low-grade malignant tumor.  This will determine how we will manage this in the future.         SANTOS ALLEN MD             D: 2019   T: 2019   MT: WT      Name:     ZEENAT ALVAREZ   MRN:      [id number]        Account:        [id number]   :      1985           Procedure Date: 2019      Document: [id number]

## 2019-07-11 NOTE — ANESTHESIA PREPROCEDURE EVALUATION
Anesthesia Pre-Procedure Evaluation    Patient: Leisa Leong   MRN: [id number] : 1985          Preoperative Diagnosis: bladder tumor    Procedure(s):  CYSTOSCOPY, WITH BLADDER BIOPSY  TRANSURETHRAL RESECTION OF BLADDER TUMOR  POSSIBLE BILATERAL  RETROGRADE PYELOGRAM    Past Medical History:   Diagnosis Date     Acne      Agoraphobia with panic disorder      Allergic rhinitis      Anxiety and depression      Asthma     allergy induced     Bilateral club feet     fetal     Endometriosis      Fam hx-cardiovas dis NEC     Father     Family history of diabetes mellitus     MGM     Fibroids      Headaches     randomly occurring, much better recently     History of IUFD 2013    history of intrauterine fetal death     HPV in female      Hx of previous reproductive problem      Incompetent cervix      Migraine      Other acne      Palpitations      Panic disorder      PONV (postoperative nausea and vomiting)     high anxiety when awakening from anesthesia.....hx in the distant past of difficult intubation - this occurred in ...     Right ovarian cyst      Seasonal allergies      Spider veins      STD (female)      SVT (supraventricular tachycardia) (H)     See's Dr. Tyree Alves for cardiology.       Past Surgical History:   Procedure Laterality Date     ABDOMEN SURGERY      laparotomy     C LASIK (EMP) W OPT MYOPIA -3.12  2009    bilaterally     CERCLAGE CERVICAL N/A 12/10/2014    Procedure: CERCLAGE CERVICAL;  Surgeon: Marc Stapleton MD;  [facility]:  [facility]     CERCLAGE CERVICAL N/A 2015    Procedure: CERCLAGE CERVICAL;  Surgeon: Mj Blum MD;  Location: [facility]      SECTION N/A 3/4/2015    Procedure:  SECTION;  Surgeon: Elle Howell MD;  Location: [facility]     CYSTECTOMY OVARIAN BENIGN  2013    Procedure: CYSTECTOMY OVARIAN BENIGN;;  Surgeon: Marc Stapleton MD;  Location:  OR     CYSTOSCOPY  2019     DILATION AND CURETTAGE       DILATION AND CURETTAGE  SUCTION  1/10/2013    Procedure: DILATION AND CURETTAGE SUCTION;  Suction D & C with Intraoperative Ultrasound;  Surgeon: Nubia Gregorio MD;  Location:  OR     DILATION AND CURETTAGE, OPERATIVE HYSTEROSCOPY WITH MORCELLATOR, COMBINED N/A 8/9/2016    Procedure: COMBINED DILATION AND CURETTAGE, OPERATIVE HYSTEROSCOPY WITH MORCELLATOR;  Surgeon: Nubia Gregorio MD;  Location: [facility]     EXCISE LESION PERINEAL  6/12/2014    Procedure: EXCISE LESION PERINEAL;  Surgeon: Marc Stapleton MD;  Location: [facility]     GYN SURGERY      Laparotomy     HC CREATE EARDRUM OPENING,GEN ANESTH  infant    P.E. Tubes     HC HYSTEROSCOPY, SURGICAL; W/ REMOVAL LEIOMYOMATA  6/2007    4 cm fibroid     HC REMOVAL OF TONSILS,12+ Y/O  3/2008    Tonsils 12+y.o.     HC TOOTH EXTRACTION W/FORCEP  3/2007    wisdom teeth     HYSTEROSCOPY,REMOVE MYOMA  7/2008     LAPAROSCOPIC CYSTECTOMY OVARIAN (BENIGN) Left 4/25/2017    Procedure: LAPAROSCOPIC CYSTECTOMY OVARIAN (BENIGN);;  Surgeon: Nubia Gregorio MD;  Location:  OR     LAPAROSCOPIC LYSIS ADHESIONS N/A 8/9/2016    Procedure: LAPAROSCOPIC LYSIS ADHESIONS;  Surgeon: Nubia Gregorio MD;  Location: [facility]     LAPAROSCOPIC TUBAL DYE STUDY  6/12/2014    Procedure: LAPAROSCOPIC TUBAL DYE STUDY;  Surgeon: Marc Stapleton MD;  Location: [facility]     LAPAROSCOPIC TUBAL DYE STUDY  8/9/2016    Procedure: LAPAROSCOPIC TUBAL DYE STUDY;  Surgeon: Nubia Gregorio MD;  Location: [facility]     laparoscopy       LAPAROSCOPY DIAGNOSTIC (GYN) N/A 4/25/2017    Procedure: LAPAROSCOPY DIAGNOSTIC (GYN);;  Surgeon: Nubia Gregorio MD;  Location:  OR     LASIK BILATERAL Bilateral 2008     MYOMECTOMY UTERUS  4/4/2013    Procedure: MYOMECTOMY UTERUS;  PELVIC LAPAROTOMY, Multiple MYOMECTOMY, RIGHT OVARIAN CYSTECTOMY, placement of seprafilm;  Surgeon: Marc Stapleton MD;  Location:  OR     OPERATIVE HYSTEROSCOPY WITH MORCELLATOR  6/12/2014    Procedure: OPERATIVE HYSTEROSCOPY WITH MORCELLATOR  (ALVARADO & NEPHEW);  Surgeon: Marc Stapleton MD;  [facility]     OPERATIVE HYSTEROSCOPY WITH MORCELLATOR N/A 9/23/2016    Procedure: OPERATIVE HYSTEROSCOPY WITH MORCELLATOR (ALVARADO & NEPHEW);  Surgeon: Nubia Gregorio MD;  [facility]     OPERATIVE HYSTEROSCOPY WITH MORCELLATOR N/A 4/25/2017    Procedure: OPERATIVE HYSTEROSCOPY WITH MORCELLATOR (ALVARADO & NEPHEW);  HYSTEROSCOPY DILATION AND CURATTAGE WITH ULTRASOUND GUIDANCE,  LAPAROSCOPIC LYSIS OF ADHESIONS, LAPAROSCOPIC BILATERAL OVARIAN CYSTECTOMY;  Surgeon: Nubia Gregorio MD;  Location:  OR     pending  2/21/2011    removal of uterine fibroids     REMOVE CERCLAGE N/A 3/4/2015    Procedure: REMOVE CERCLAGE;  Surgeon: Elle Howell MD;  [facility]: [facility]     wisdom teeth       wisdom teeth extraction         Anesthesia Evaluation     .             ROS/MED HX    ENT/Pulmonary:     (+)Mild Persistent asthma Treatment: Inhaler prn,  , . .   (-) tobacco use and sleep apnea   Neurologic: Comment: RLS    (+)migraines,     Cardiovascular:         METS/Exercise Tolerance:     Hematologic:         Musculoskeletal:         GI/Hepatic:        (-) GERD   Renal/Genitourinary:         Endo:      (-) Type I DM   Psychiatric:     (+) psychiatric history depression and anxiety      Infectious Disease:         Malignancy:         Other:                          Physical Exam  Normal systems: cardiovascular, pulmonary and dental    Airway   Mallampati: III  TM distance: >3 FB  Neck ROM: full    Dental     Cardiovascular       Pulmonary             Lab Results   Component Value Date    WBC 8.2 11/27/2017    HGB 14.1 07/02/2019    HCT 38.3 11/27/2017     11/27/2017    CRP 78.8 (H) 03/12/2015    SED 5 11/29/2010     11/27/2017    POTASSIUM 3.9 11/27/2017    CHLORIDE 106 11/27/2017    CO2 21 11/27/2017    BUN 19 11/27/2017    CR 0.70 11/27/2017    GLC 88 11/27/2017    MADAI 9.4 11/27/2017    MAG 2.2 05/30/2019    ALBUMIN 2.0 (L) 03/09/2015     "PROTTOTAL 5.8 (L) 03/09/2015    ALT 38 03/09/2015    AST 22 03/09/2015    ALKPHOS 119 03/09/2015    BILITOTAL 0.3 03/09/2015    PTT 26 03/04/2015    INR 1.27 (H) 03/06/2015    FIBR 649 (H) 03/04/2015    TSH 2.02 11/27/2017    HCG Negative 07/11/2019    HCGS Negative 04/25/2017       Preop Vitals  BP Readings from Last 3 Encounters:   07/11/19 123/84   07/05/19 115/80   07/02/19 100/64    Pulse Readings from Last 3 Encounters:   07/11/19 78   07/05/19 80   07/02/19 82      Resp Readings from Last 3 Encounters:   07/11/19 16   04/26/17 16   09/23/16 16    SpO2 Readings from Last 3 Encounters:   07/11/19 98%   07/02/19 96%   06/26/19 98%      Temp Readings from Last 1 Encounters:   07/11/19 36.4  C (97.5  F) (Oral)    Ht Readings from Last 1 Encounters:   07/11/19 1.727 m (5' 8\")      Wt Readings from Last 1 Encounters:   07/11/19 93.2 kg (205 lb 6.4 oz)    Estimated body mass index is 31.23 kg/m  as calculated from the following:    Height as of this encounter: 1.727 m (5' 8\").    Weight as of this encounter: 93.2 kg (205 lb 6.4 oz).       Anesthesia Plan      History & Physical Review  History and physical reviewed and following examination; no interval change.    ASA Status:  2 .    NPO Status:  > 8 hours    Plan for General and LMA with Intravenous induction. Maintenance will be TIVA.    PONV prophylaxis:  Ondansetron (or other 5HT-3) and Dexamethasone or Solumedrol  Please give Toradol IV intra-op.      Postoperative Care  Postoperative pain management:  Multi-modal analgesia.      Consents  Anesthetic plan, risks, benefits and alternatives discussed with:  Patient.  Use of blood products discussed: No .   .                 Parviz Adkins MD  "

## 2019-07-12 LAB — COPATH REPORT: NORMAL

## 2019-07-16 ENCOUNTER — OFFICE VISIT (OUTPATIENT)
Dept: UROLOGY | Facility: CLINIC | Age: 34
End: 2019-07-16
Payer: COMMERCIAL

## 2019-07-16 VITALS
HEART RATE: 80 BPM | DIASTOLIC BLOOD PRESSURE: 68 MMHG | WEIGHT: 205 LBS | BODY MASS INDEX: 31.17 KG/M2 | SYSTOLIC BLOOD PRESSURE: 112 MMHG

## 2019-07-16 DIAGNOSIS — Z85.51 PERSONAL HISTORY OF MALIGNANT NEOPLASM OF BLADDER: Primary | ICD-10-CM

## 2019-07-16 PROCEDURE — 99214 OFFICE O/P EST MOD 30 MIN: CPT | Performed by: UROLOGY

## 2019-07-16 RX ORDER — KETOROLAC TROMETHAMINE 10 MG/1
TABLET, FILM COATED ORAL
Refills: 0 | COMMUNITY
Start: 2019-07-11

## 2019-07-16 ASSESSMENT — PAIN SCALES - GENERAL: PAINLEVEL: NO PAIN (0)

## 2019-07-16 NOTE — LETTER
7/16/2019       RE: Leisa Leong  [address] [facility] [facility] [address]     Dear Colleague,    Thank you for referring your patient, Leisa Leong, to the [facility] [place] at [facility]. Please see a copy of my visit note below.    It is a great pleasure to see this very pleasant 34-year-old lady in follow-up consultation today.  We recall recently, during the course of evaluation for bladder related symptoms but without evidence of hematuria, found, a cystoscopy for a small neoplasm in the bladder lateral to the left ureteric orifice.  This was subsequently treated by biopsy and resection.    SPECIMEN(S):   Bladder biopsy     FINAL DIAGNOSIS:   Urinary bladder, tumor, biopsy:   1. 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   2. Muscularis propria is not present.     Electronically signed out by:     Jose De La Cruz M.D.     The pathology report indicates this to be a papillary urothelial neoplasm of low malignant potential.  This is a very satisfactory result although it is not considered completely benign.  I have discussed this in detail with the patient today explaining the nature of the pathology report, the nature of these types of tumors in the bladder.  I anticipate an excellent prognosis  I would recommend that we continue surveillance with a follow-up cystoscopy in about 6 months.  We did discuss the nature of bladder cancer and other potential therapeutic strategies which fortunately, I think would be most unlikely in this particular situation.  This did include, intravesical therapy with treatment such as BCG, mitomycin and others.  Fortunately, there is no evidence of this tumor being invasive so the likelihood of ever having to consider major surgery since his cystectomy would be extremely remote.  She is considering a move to [place] in the relatively near future.  If this does transpire, we  "will be happy to forward any records to new physicians in the state of [place] so that she can be seen by a urologist there for ongoing follow-up.  If she decides to stay in [place] of course we will follow-up here.  I carefully discussed the entire situation with the patient in detail today.  I reviewed all the records, went over the pathology reports and addressed and answered all the questions.    Plan.  6 months for cystoscopy if she still in the [facility].  She does move to [place] we will transfer any records to which of the location she wishes.    Time.  Total of 30 minutes was spent in an extended discussion about this situation today    \"This dictation was performed with voice recognition software and may contain errors,  omissions and inadvertent word substitution.\"      Again, thank you for allowing me to participate in the care of your patient.      Sincerely,    Mandeep Bernstein MD      "

## 2019-07-16 NOTE — PROGRESS NOTES
It is a great pleasure to see this very pleasant 34-year-old lady in follow-up consultation today.  We recall recently, during the course of evaluation for bladder related symptoms but without evidence of hematuria, found, a cystoscopy for a small neoplasm in the bladder lateral to the left ureteric orifice.  This was subsequently treated by biopsy and resection.    SPECIMEN(S):   Bladder biopsy     FINAL DIAGNOSIS:   Urinary bladder, tumor, biopsy:   1. 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   2. Muscularis propria is not present.     Electronically signed out by:     Jose De La Cruz M.D.     The pathology report indicates this to be a papillary urothelial neoplasm of low malignant potential.  This is a very satisfactory result although it is not considered completely benign.  I have discussed this in detail with the patient today explaining the nature of the pathology report, the nature of these types of tumors in the bladder.  I anticipate an excellent prognosis  I would recommend that we continue surveillance with a follow-up cystoscopy in about 6 months.  We did discuss the nature of bladder cancer and other potential therapeutic strategies which fortunately, I think would be most unlikely in this particular situation.  This did include, intravesical therapy with treatment such as BCG, mitomycin and others.  Fortunately, there is no evidence of this tumor being invasive so the likelihood of ever having to consider major surgery since his cystectomy would be extremely remote.  She is considering a move to [place] in the relatively near future.  If this does transpire, we will be happy to forward any records to new physicians in the state of [place] so that she can be seen by a urologist there for ongoing follow-up.  If she decides to stay in [place] of course we will follow-up here.  I carefully discussed the entire situation with the patient in detail today.  I reviewed all the  "records, went over the pathology reports and addressed and answered all the questions.    Plan.  6 months for cystoscopy if she still in the state of [place].  She does move to [place] we will transfer any records to which of the location she wishes.    Time.  Total of 30 minutes was spent in an extended discussion about this situation today    \"This dictation was performed with voice recognition software and may contain errors,  omissions and inadvertent word substitution.\"    "

## 2019-07-22 NOTE — ADDENDUM NOTE
Addendum  created 07/22/19 0911 by Parviz Adkins MD    Attestation recorded in Intraprocedure, Intraprocedure Attestations filed

## 2019-07-29 ENCOUNTER — TELEPHONE (OUTPATIENT)
Dept: UROLOGY | Facility: CLINIC | Age: 34
End: 2019-07-29

## 2019-07-29 DIAGNOSIS — N39.0 URINARY TRACT INFECTION: ICD-10-CM

## 2019-07-29 DIAGNOSIS — R30.0 BURNING WITH URINATION: Primary | ICD-10-CM

## 2019-07-29 DIAGNOSIS — R30.0 BURNING WITH URINATION: ICD-10-CM

## 2019-07-29 LAB
ALBUMIN UR-MCNC: NEGATIVE MG/DL
APPEARANCE UR: CLEAR
BILIRUB UR QL STRIP: NEGATIVE
COLOR UR AUTO: YELLOW
GLUCOSE UR STRIP-MCNC: NEGATIVE MG/DL
HGB UR QL STRIP: ABNORMAL
KETONES UR STRIP-MCNC: NEGATIVE MG/DL
LEUKOCYTE ESTERASE UR QL STRIP: NEGATIVE
NITRATE UR QL: NEGATIVE
PH UR STRIP: 5.5 PH (ref 5–7)
SOURCE: ABNORMAL
SP GR UR STRIP: 1.02 (ref 1–1.03)
UROBILINOGEN UR STRIP-ACNC: 0.2 EU/DL (ref 0.2–1)

## 2019-07-29 PROCEDURE — 81003 URINALYSIS AUTO W/O SCOPE: CPT | Performed by: UROLOGY

## 2019-07-29 PROCEDURE — 87086 URINE CULTURE/COLONY COUNT: CPT | Performed by: UROLOGY

## 2019-07-29 NOTE — TELEPHONE ENCOUNTER
Called Leisa back to f/u on this message. She reports burning with urination and expresses concern for UTI. Temp denied. Orders placed for UA/UC. Pt expressed understanding and agreement with this plan.  RAMÓN Lion RN       [facility] Call Center    Phone Message    May a detailed message be left on voicemail: yes    Reason for Call: Symptoms or Concerns     Current symptom or concern: In the past two days, pt experiencing pain while urinating, possible infection after procedure.  Also, yesterday, pt can feel it even when she is walking, and it is getting uncomfortable to sleep at night, now.    Symptoms have been present for:  2 day(s)    Has patient previously been seen for this? Yes    By: Dr. Mandeep Bernstein    Date: 7/16/19    Are there any new or worsening symptoms? Yes      Action Taken: Message routed to:  Other: UA Clinic Pool

## 2019-07-30 ENCOUNTER — TELEPHONE (OUTPATIENT)
Dept: UROLOGY | Facility: CLINIC | Age: 34
End: 2019-07-30

## 2019-07-30 DIAGNOSIS — R30.0 BURNING WITH URINATION: Primary | ICD-10-CM

## 2019-07-30 LAB
BACTERIA SPEC CULT: NORMAL
BACTERIA SPEC CULT: NORMAL
SPECIMEN SOURCE: NORMAL

## 2019-07-30 RX ORDER — CIPROFLOXACIN 500 MG/1
500 TABLET, FILM COATED ORAL 2 TIMES DAILY
Qty: 10 TABLET | Refills: 0 | Status: SHIPPED | OUTPATIENT
Start: 2019-07-30 | End: 2019-08-04

## 2019-07-30 NOTE — TELEPHONE ENCOUNTER
Pt calling and stating she is having freq and a lot of pressure.  Verbal ok per Dr. Bernstein to give her cipro 500mg BID for 5 days.  [facility] [place].  Pt notified that Rx was sent.  Pt will Follow-up with us as needed.  ALE Grande CMA

## 2019-07-30 NOTE — TELEPHONE ENCOUNTER
[facility] Call Center    Phone Message    May a detailed message be left on voicemail: yes    Reason for Call: Other: Pt called in wants to dicuss results understand that the urine funmilayo was negative but wants to discuss what to do now about pain      Action Taken: Message routed to:  [facility] uro

## 2019-08-06 DIAGNOSIS — E78.5 DYSLIPIDEMIA: ICD-10-CM

## 2019-08-06 DIAGNOSIS — E78.5 DYSLIPIDEMIA: Primary | ICD-10-CM

## 2019-08-06 LAB
ALT SERPL W P-5'-P-CCNC: 35 U/L (ref 0–50)
CHOLEST SERPL-MCNC: 129 MG/DL
HDLC SERPL-MCNC: 55 MG/DL
LDLC SERPL CALC-MCNC: 47 MG/DL
NONHDLC SERPL-MCNC: 74 MG/DL
TRIGL SERPL-MCNC: 137 MG/DL

## 2019-08-06 PROCEDURE — 84460 ALANINE AMINO (ALT) (SGPT): CPT | Performed by: NURSE PRACTITIONER

## 2019-08-06 PROCEDURE — 36415 COLL VENOUS BLD VENIPUNCTURE: CPT | Performed by: NURSE PRACTITIONER

## 2019-08-06 PROCEDURE — 80061 LIPID PANEL: CPT | Performed by: NURSE PRACTITIONER

## 2019-08-07 ENCOUNTER — CARE COORDINATION (OUTPATIENT)
Dept: CARDIOLOGY | Facility: CLINIC | Age: 34
End: 2019-08-07

## 2019-08-07 DIAGNOSIS — E78.5 DYSLIPIDEMIA: ICD-10-CM

## 2019-08-07 RX ORDER — ROSUVASTATIN CALCIUM 10 MG/1
10 TABLET, COATED ORAL DAILY
Qty: 90 TABLET | Refills: 3 | COMMUNITY
Start: 2019-08-07

## 2019-08-07 NOTE — PROGRESS NOTES
Called and spoke with pt.  Reviewed that cholesterol labs are significantly better since last check and ETHAN Omalley recommend reducing crestor to 5mg daily, continuing lifestyle modification with diet and exercise and repeat labs in 3 months.      Pt reports she just started taking Crestor 1.5 wks ago due to her recent bladder surgery and wanting to wait until after that to start a new medication.  She reports she has significantly changed her diet and is wondering if she should stop Crestor completely and get labs rechecked in 3mo.  She also requested to know if Dr. Chance has any recommendations for providers in [place], as she is currently in the process of moving there.     Reviewed above information with Dr. Chance who recommends pt remain on Crestor 10mg daily and get cholesterol rechecked in ~5 wks, as he does believe that reduction in cholesterol is from  Crestor, not pt's diet changes.  He also recommends pt look at [facility] in [place] for follow up.     Called and spoke with pt.  Reviewed Dr. Chance's recommendations to continue on Crestor 10mg daily and have cholesterol rechecked in ~5 wks.  Also reviewed Dr. Chance's recommendation for follow up in [place].  Pt verbalized understanding and agrees with this plan.  Discussed with pt that if she has not found a provider in the next 5 wks, can fax lab order to be done- she agrees with this plan and will call to request lab order be faxed if needed.    TINO Diaz 10:43 AM 8/7/2019

## 2019-08-14 ENCOUNTER — TRANSFERRED RECORDS (OUTPATIENT)
Dept: HEALTH INFORMATION MANAGEMENT | Facility: CLINIC | Age: 34
End: 2019-08-14

## 2019-09-27 ENCOUNTER — HEALTH MAINTENANCE LETTER (OUTPATIENT)
Age: 34
End: 2019-09-27

## 2019-10-26 ENCOUNTER — HEALTH MAINTENANCE LETTER (OUTPATIENT)
Age: 34
End: 2019-10-26

## 2019-12-14 ENCOUNTER — TRANSFERRED RECORDS (OUTPATIENT)
Dept: HEALTH INFORMATION MANAGEMENT | Facility: CLINIC | Age: 34
End: 2019-12-14

## 2020-01-07 DIAGNOSIS — Z85.51 PERSONAL HISTORY OF MALIGNANT NEOPLASM OF BLADDER: Primary | ICD-10-CM

## 2020-10-13 NOTE — PATIENT INSTRUCTIONS

## 2021-01-09 ENCOUNTER — HEALTH MAINTENANCE LETTER (OUTPATIENT)
Age: 36
End: 2021-01-09

## 2021-10-23 ENCOUNTER — HEALTH MAINTENANCE LETTER (OUTPATIENT)
Age: 36
End: 2021-10-23

## 2022-02-12 ENCOUNTER — HEALTH MAINTENANCE LETTER (OUTPATIENT)
Age: 37
End: 2022-02-12

## 2022-10-09 ENCOUNTER — HEALTH MAINTENANCE LETTER (OUTPATIENT)
Age: 37
End: 2022-10-09

## 2023-03-25 ENCOUNTER — HEALTH MAINTENANCE LETTER (OUTPATIENT)
Age: 38
End: 2023-03-25

## 2024-03-16 ENCOUNTER — HEALTH MAINTENANCE LETTER (OUTPATIENT)
Age: 39
End: 2024-03-16

## (undated) DEVICE — GLOVE PROTEXIS BLUE W/NEU-THERA 7.0  2D73EB70

## (undated) DEVICE — SUCTION CANISTER MEDIVAC LINER 3000ML W/LID 65651-530

## (undated) DEVICE — ESU CORD MONOPOLAR HIGH FREQUENCY 26006M-D/10

## (undated) DEVICE — SOL WATER IRRIG 1000ML BOTTLE 2F7114

## (undated) DEVICE — KIT HYSTEROSCOPIC 7209827

## (undated) DEVICE — CABLE HIGH FREQEUCY STERILE 8MM PLUG 300CM 277KB-D/10

## (undated) DEVICE — LINEN TOWEL PACK X5 5464

## (undated) DEVICE — NDL INSUFFLATION 14GA STEP S100000

## (undated) DEVICE — ENDO SHEARS 5MM 176643

## (undated) DEVICE — PACK TUR CUSTOM SBA15RUFSE

## (undated) DEVICE — SUCTION CANISTER BEMIS HI FLOW 006772-901

## (undated) DEVICE — GLOVE PROTEXIS MICRO 6.5  2D73PM65

## (undated) DEVICE — GLOVE PROTEXIS W/NEU-THERA 8.0  2D73TE80

## (undated) DEVICE — GLOVE PROTEXIS BLUE W/NEU-THERA 6.5  2D73EB65

## (undated) DEVICE — GLOVE PROTEXIS POWDER FREE SMT 6.5  2D72PT65X

## (undated) DEVICE — RAD RX ISOVUE 300 (50ML) 61% IOPAMIDOL CHARGE PER ML

## (undated) DEVICE — PREP DURAPREP 26ML APL 8630

## (undated) DEVICE — ENDO TROCAR 05MM VERSASTEP VS101005

## (undated) DEVICE — SOL WATER IRRIG 3000ML BAG 2B7117

## (undated) DEVICE — SUCTION IRR TRUMPET VALVE LAP ASU1201

## (undated) DEVICE — SOL NACL 0.9% INJ 1000ML BAG 2B1324X

## (undated) DEVICE — Device

## (undated) DEVICE — BLADE CLIPPER 4406

## (undated) DEVICE — ESU CORD MONOPOLAR 10'  E0510

## (undated) DEVICE — DRSG STERI STRIP 1/4X3" R1541

## (undated) DEVICE — ESU GROUND PAD UNIVERSAL W/O CORD

## (undated) DEVICE — ENDO TROCAR FIRST ENTRY KII FIOS Z-THRD 05X100MM CTF03

## (undated) DEVICE — CATH URETERAL OPEN END 6FR AXXCESS

## (undated) DEVICE — DRSG BANDAID 1X3" FABRIC CURITY LATEX FREE KC44101

## (undated) DEVICE — SU VICRYL 0 CT-2 27" J334H

## (undated) DEVICE — PAD CHUX UNDERPAD 23X24" 7136

## (undated) DEVICE — BAG CYSTO TABLE DRAIN

## (undated) DEVICE — GLOVE PROTEXIS W/NEU-THERA 6.5  2D73TE65

## (undated) DEVICE — DRSG STERI STRIP 1/2X4" R1547

## (undated) DEVICE — SU MONOCRYL 4-0 PS-2 18" UND Y496G

## (undated) DEVICE — BLADE MORCELLATOR TRUCLEAR INSICOR PLUS 2.9 72202536

## (undated) DEVICE — ESU HOLDER LAP INST DISP PURPLE LONG 330MM H-PRO-330

## (undated) DEVICE — EVACUATOR BLADDER UROVAC LATEX M0067301250

## (undated) DEVICE — SOL NACL 0.9% IRRIG 3000ML BAG 2B7477

## (undated) DEVICE — SOL NACL 0.9% IRRIG 1000ML BOTTLE 07138-09

## (undated) RX ORDER — FENTANYL CITRATE 50 UG/ML
INJECTION, SOLUTION INTRAMUSCULAR; INTRAVENOUS
Status: DISPENSED
Start: 2019-07-11

## (undated) RX ORDER — LIDOCAINE HYDROCHLORIDE 20 MG/ML
INJECTION, SOLUTION EPIDURAL; INFILTRATION; INTRACAUDAL; PERINEURAL
Status: DISPENSED
Start: 2019-07-11

## (undated) RX ORDER — PROPOFOL 10 MG/ML
INJECTION, EMULSION INTRAVENOUS
Status: DISPENSED
Start: 2019-07-11

## (undated) RX ORDER — ONDANSETRON 2 MG/ML
INJECTION INTRAMUSCULAR; INTRAVENOUS
Status: DISPENSED
Start: 2019-07-11

## (undated) RX ORDER — HYDROMORPHONE HYDROCHLORIDE 1 MG/ML
INJECTION, SOLUTION INTRAMUSCULAR; INTRAVENOUS; SUBCUTANEOUS
Status: DISPENSED
Start: 2017-04-25

## (undated) RX ORDER — CEFAZOLIN SODIUM 1 G/3ML
INJECTION, POWDER, FOR SOLUTION INTRAMUSCULAR; INTRAVENOUS
Status: DISPENSED
Start: 2017-04-25

## (undated) RX ORDER — KETOROLAC TROMETHAMINE 30 MG/ML
INJECTION, SOLUTION INTRAMUSCULAR; INTRAVENOUS
Status: DISPENSED
Start: 2019-07-11

## (undated) RX ORDER — ACETAMINOPHEN 325 MG/1
TABLET ORAL
Status: DISPENSED
Start: 2017-04-25

## (undated) RX ORDER — DEXAMETHASONE SODIUM PHOSPHATE 4 MG/ML
INJECTION, SOLUTION INTRA-ARTICULAR; INTRALESIONAL; INTRAMUSCULAR; INTRAVENOUS; SOFT TISSUE
Status: DISPENSED
Start: 2019-07-11

## (undated) RX ORDER — CEFAZOLIN SODIUM 2 G/100ML
INJECTION, SOLUTION INTRAVENOUS
Status: DISPENSED
Start: 2019-07-11

## (undated) RX ORDER — GLYCOPYRROLATE 0.2 MG/ML
INJECTION, SOLUTION INTRAMUSCULAR; INTRAVENOUS
Status: DISPENSED
Start: 2017-04-25

## (undated) RX ORDER — DEXAMETHASONE SODIUM PHOSPHATE 4 MG/ML
INJECTION, SOLUTION INTRA-ARTICULAR; INTRALESIONAL; INTRAMUSCULAR; INTRAVENOUS; SOFT TISSUE
Status: DISPENSED
Start: 2017-04-25

## (undated) RX ORDER — LIDOCAINE HYDROCHLORIDE 20 MG/ML
INJECTION, SOLUTION EPIDURAL; INFILTRATION; INTRACAUDAL; PERINEURAL
Status: DISPENSED
Start: 2017-04-25

## (undated) RX ORDER — PHENAZOPYRIDINE HYDROCHLORIDE 200 MG/1
TABLET, FILM COATED ORAL
Status: DISPENSED
Start: 2017-04-25

## (undated) RX ORDER — HYDROMORPHONE HYDROCHLORIDE 1 MG/ML
INJECTION, SOLUTION INTRAMUSCULAR; INTRAVENOUS; SUBCUTANEOUS
Status: DISPENSED
Start: 2019-07-11

## (undated) RX ORDER — FENTANYL CITRATE 50 UG/ML
INJECTION, SOLUTION INTRAMUSCULAR; INTRAVENOUS
Status: DISPENSED
Start: 2017-04-25

## (undated) RX ORDER — LIDOCAINE HYDROCHLORIDE 10 MG/ML
INJECTION, SOLUTION EPIDURAL; INFILTRATION; INTRACAUDAL; PERINEURAL
Status: DISPENSED
Start: 2019-07-11

## (undated) RX ORDER — ONDANSETRON 2 MG/ML
INJECTION INTRAMUSCULAR; INTRAVENOUS
Status: DISPENSED
Start: 2017-04-25

## (undated) RX ORDER — CEFAZOLIN SODIUM 2 G/100ML
INJECTION, SOLUTION INTRAVENOUS
Status: DISPENSED
Start: 2017-04-25

## (undated) RX ORDER — BUPIVACAINE HYDROCHLORIDE 2.5 MG/ML
INJECTION, SOLUTION EPIDURAL; INFILTRATION; INTRACAUDAL
Status: DISPENSED
Start: 2017-04-25